# Patient Record
Sex: FEMALE | Race: BLACK OR AFRICAN AMERICAN | NOT HISPANIC OR LATINO | URBAN - METROPOLITAN AREA
[De-identification: names, ages, dates, MRNs, and addresses within clinical notes are randomized per-mention and may not be internally consistent; named-entity substitution may affect disease eponyms.]

---

## 2021-06-17 ENCOUNTER — OUTPATIENT (OUTPATIENT)
Dept: OUTPATIENT SERVICES | Facility: HOSPITAL | Age: 21
LOS: 1 days | Discharge: HOME | End: 2021-06-17
Payer: COMMERCIAL

## 2021-06-17 PROCEDURE — 90792 PSYCH DIAG EVAL W/MED SRVCS: CPT | Mod: 95

## 2021-07-15 DIAGNOSIS — F33.9 MAJOR DEPRESSIVE DISORDER, RECURRENT, UNSPECIFIED: ICD-10-CM

## 2021-07-15 DIAGNOSIS — F60.3 BORDERLINE PERSONALITY DISORDER: ICD-10-CM

## 2023-02-20 PROBLEM — Z00.00 ENCOUNTER FOR PREVENTIVE HEALTH EXAMINATION: Status: ACTIVE | Noted: 2023-02-20

## 2023-02-28 ENCOUNTER — APPOINTMENT (OUTPATIENT)
Dept: ORTHOPEDIC SURGERY | Facility: CLINIC | Age: 23
End: 2023-02-28

## 2023-03-03 ENCOUNTER — APPOINTMENT (OUTPATIENT)
Dept: ORTHOPEDIC SURGERY | Facility: CLINIC | Age: 23
End: 2023-03-03
Payer: COMMERCIAL

## 2023-03-03 ENCOUNTER — NON-APPOINTMENT (OUTPATIENT)
Age: 23
End: 2023-03-03

## 2023-03-03 VITALS
BODY MASS INDEX: 27.8 KG/M2 | DIASTOLIC BLOOD PRESSURE: 71 MMHG | OXYGEN SATURATION: 98 % | HEIGHT: 66 IN | WEIGHT: 173 LBS | SYSTOLIC BLOOD PRESSURE: 116 MMHG | HEART RATE: 80 BPM

## 2023-03-03 DIAGNOSIS — S63.104D UNSPECIFIED DISLOCATION OF RIGHT THUMB, SUBSEQUENT ENCOUNTER: ICD-10-CM

## 2023-03-03 PROCEDURE — 99204 OFFICE O/P NEW MOD 45 MIN: CPT | Mod: 57

## 2023-03-03 NOTE — HISTORY OF PRESENT ILLNESS
[FreeTextEntry1] : 22-year-old right-hand-dominant female presents with a right thumb injury x5 weeks.  She reports that she was lifting weights at the gym when the weight fell and injured her right thumb.  She went to an urgent care where she was told she had no fracture nor dislocation.  Since that time she has developed a swan-neck deformity to the thumb.  She has intense pain over the metacarpo phalangeal joint since that time.  She denies numbness and tingling.  She is able to actively flex and extend at the thumb IP joint.  She reports that she is unable to use the hand and would like to get back to her life.  She is here today for surgical evaluation.

## 2023-03-03 NOTE — PHYSICAL EXAM
[de-identified] : General: No acute distress\par Respiratory: Nonlabored\par Musculoskeletal: Right upper extremity was seen and examined\par Deformity noted at the right thumb–MP joint in hyperextension IP joint in flexion.\par She is able to flex and extend at the IP joint\par Radial and ulnar sensation at the thumb is intact\par 2+ radial pulse, less than 2-second capillary refill at the thumb\par Tender to palpation over the metacarpal phalangeal joint.\par Unable to manually reduce the joint today in the office [de-identified] : X-ray right thumb: MP joint incongruity with evidence of dislocation, no fractures

## 2023-03-10 ENCOUNTER — OUTPATIENT (OUTPATIENT)
Dept: OUTPATIENT SERVICES | Facility: HOSPITAL | Age: 23
LOS: 1 days | End: 2023-03-10
Payer: COMMERCIAL

## 2023-03-10 ENCOUNTER — APPOINTMENT (OUTPATIENT)
Dept: MRI IMAGING | Facility: CLINIC | Age: 23
End: 2023-03-10
Payer: COMMERCIAL

## 2023-03-10 ENCOUNTER — TRANSCRIPTION ENCOUNTER (OUTPATIENT)
Age: 23
End: 2023-03-10

## 2023-03-10 ENCOUNTER — OUTPATIENT (OUTPATIENT)
Dept: OUTPATIENT SERVICES | Facility: HOSPITAL | Age: 23
LOS: 1 days | End: 2023-03-10

## 2023-03-10 VITALS
HEART RATE: 79 BPM | DIASTOLIC BLOOD PRESSURE: 69 MMHG | SYSTOLIC BLOOD PRESSURE: 109 MMHG | OXYGEN SATURATION: 99 % | RESPIRATION RATE: 16 BRPM | WEIGHT: 171.96 LBS | TEMPERATURE: 99 F | HEIGHT: 66 IN

## 2023-03-10 DIAGNOSIS — S63.104A UNSPECIFIED DISLOCATION OF RIGHT THUMB, INITIAL ENCOUNTER: ICD-10-CM

## 2023-03-10 DIAGNOSIS — S63.104D UNSPECIFIED DISLOCATION OF RIGHT THUMB, SUBSEQUENT ENCOUNTER: ICD-10-CM

## 2023-03-10 DIAGNOSIS — Z00.00 ENCOUNTER FOR GENERAL ADULT MEDICAL EXAMINATION WITHOUT ABNORMAL FINDINGS: ICD-10-CM

## 2023-03-10 DIAGNOSIS — S63.104S: ICD-10-CM

## 2023-03-10 LAB
HCG UR QL: POSITIVE
HCT VFR BLD CALC: 36.5 % — SIGNIFICANT CHANGE UP (ref 34.5–45)
HGB BLD-MCNC: 11.7 G/DL — SIGNIFICANT CHANGE UP (ref 11.5–15.5)
MCHC RBC-ENTMCNC: 30 PG — SIGNIFICANT CHANGE UP (ref 27–34)
MCHC RBC-ENTMCNC: 32.1 GM/DL — SIGNIFICANT CHANGE UP (ref 32–36)
MCV RBC AUTO: 93.6 FL — SIGNIFICANT CHANGE UP (ref 80–100)
NRBC # BLD: 0 /100 WBCS — SIGNIFICANT CHANGE UP (ref 0–0)
NRBC # FLD: 0 K/UL — SIGNIFICANT CHANGE UP (ref 0–0)
PLATELET # BLD AUTO: 385 K/UL — SIGNIFICANT CHANGE UP (ref 150–400)
RBC # BLD: 3.9 M/UL — SIGNIFICANT CHANGE UP (ref 3.8–5.2)
RBC # FLD: 13.8 % — SIGNIFICANT CHANGE UP (ref 10.3–14.5)
WBC # BLD: 11.33 K/UL — HIGH (ref 3.8–10.5)
WBC # FLD AUTO: 11.33 K/UL — HIGH (ref 3.8–10.5)

## 2023-03-10 PROCEDURE — 73218 MRI UPPER EXTREMITY W/O DYE: CPT

## 2023-03-10 PROCEDURE — 73218 MRI UPPER EXTREMITY W/O DYE: CPT | Mod: 26,RT

## 2023-03-10 NOTE — H&P PST ADULT - SKIN COMMENTS
rash present on back of right hand, pt reports its a fungal rash and surgeon noted it during her consult visit.

## 2023-03-10 NOTE — H&P PST ADULT - HISTORY OF PRESENT ILLNESS
31 yo female with preop dx. unspecified dislocation right thumb presents to pre surgical testing.  Pt reports she dislocated her thumb 5-6 weeks ago, now s/p xray and MRI. Pt is scheduled for open reduction percutaneous pinning with possible ligament repair.

## 2023-03-10 NOTE — H&P PST ADULT - PROBLEM SELECTOR PLAN 1
Tommy Seo 
Pt is scheduled for open reduction percutaneous pinning with possible ligament repair. Verbal and written pre op instructions reviewed with patient and pt able to verbalize understanding.   Verbal and written instructions given with chlorhexidine wash, pt able to verbalize understanding via teach back method. Sterile cup given, pt instructed to bring urine sample AM of surgery for UCG and pt able to verbalize understanding.   Email sent to surgeon regarding rash on hand.

## 2023-03-10 NOTE — H&P PST ADULT - NEGATIVE MUSCULOSKELETAL SYMPTOMS
no neck pain/no arm pain L/no arm pain R/no back pain/no leg pain L/no leg pain R no neck pain/no arm pain L/no back pain/no leg pain L/no leg pain R

## 2023-03-10 NOTE — H&P PST ADULT - AS BP NONINV METHOD
Anesthetic History   No history of anesthetic complications            Review of Systems / Medical History  Patient summary reviewed, nursing notes reviewed and pertinent labs reviewed    Pulmonary            Asthma : well controlled       Neuro/Psych     seizures: well controlled    Psychiatric history    Comments: H/o MS Cardiovascular    Hypertension              Exercise tolerance: >4 METS     GI/Hepatic/Renal     GERD: well controlled      PUD     Endo/Other    Diabetes: well controlled, type 2    Obesity and arthritis     Other Findings   Comments:   Risk Factors for Postoperative nausea/vomiting:       History of postoperative nausea/vomiting? NO       Female? YES       Motion sickness? NO       Intended opioid administration for postoperative analgesia? YES      Smoking Abstinence  Current Smoker? NO  Elective Surgery? NO  Seen preoperatively by anesthesiologist or proxy prior to day of surgery? YES  Pt abstained from smoking 24 hours prior to anesthesia?  N/A         Physical Exam    Airway  Mallampati: III  TM Distance: 4 - 6 cm  Neck ROM: normal range of motion   Mouth opening: Normal     Cardiovascular  Regular rate and rhythm,  S1 and S2 normal,  no murmur, click, rub, or gallop  Rhythm: regular  Rate: normal         Dental  No notable dental hx       Pulmonary  Breath sounds clear to auscultation               Abdominal  GI exam deferred       Other Findings            Anesthetic Plan    ASA: 3  Anesthesia type: general and regional - supraclavicular block          Induction: Intravenous  Anesthetic plan and risks discussed with: Patient
electronic

## 2023-03-15 ENCOUNTER — TRANSCRIPTION ENCOUNTER (OUTPATIENT)
Age: 23
End: 2023-03-15

## 2023-03-16 ENCOUNTER — OUTPATIENT (OUTPATIENT)
Dept: OUTPATIENT SERVICES | Facility: HOSPITAL | Age: 23
LOS: 1 days | Discharge: HOME | End: 2023-03-16
Payer: COMMERCIAL

## 2023-03-16 ENCOUNTER — APPOINTMENT (OUTPATIENT)
Dept: ORTHOPEDIC SURGERY | Facility: AMBULATORY SURGERY CENTER | Age: 23
End: 2023-03-16

## 2023-03-16 ENCOUNTER — TRANSCRIPTION ENCOUNTER (OUTPATIENT)
Age: 23
End: 2023-03-16

## 2023-03-16 VITALS
OXYGEN SATURATION: 99 % | DIASTOLIC BLOOD PRESSURE: 74 MMHG | WEIGHT: 171.96 LBS | HEART RATE: 74 BPM | HEIGHT: 66 IN | SYSTOLIC BLOOD PRESSURE: 111 MMHG | RESPIRATION RATE: 16 BRPM | TEMPERATURE: 98 F

## 2023-03-16 VITALS
HEART RATE: 62 BPM | RESPIRATION RATE: 16 BRPM | TEMPERATURE: 98 F | OXYGEN SATURATION: 98 % | SYSTOLIC BLOOD PRESSURE: 111 MMHG | DIASTOLIC BLOOD PRESSURE: 70 MMHG

## 2023-03-16 DIAGNOSIS — S63.104D UNSPECIFIED DISLOCATION OF RIGHT THUMB, SUBSEQUENT ENCOUNTER: ICD-10-CM

## 2023-03-16 PROCEDURE — 26541 REPAIR HAND JOINT WITH GRAFT: CPT | Mod: F5

## 2023-03-16 PROCEDURE — 26735 TREAT FINGER FRACTURE EACH: CPT | Mod: F5

## 2023-03-16 PROCEDURE — 26715 TREAT KNUCKLE DISLOCATION: CPT | Mod: F5

## 2023-03-16 DEVICE — K-WIRE S&N DOUBLE TROCAR 0.035" X 4": Type: IMPLANTABLE DEVICE | Status: FUNCTIONAL

## 2023-03-16 DEVICE — K-WIRE S&N DOUBLE TROCAR 0.062" X 4": Type: IMPLANTABLE DEVICE | Status: FUNCTIONAL

## 2023-03-16 DEVICE — ANCHOR DX SWIVLCK SL FORK EYELET 3.5X8.5MM: Type: IMPLANTABLE DEVICE | Status: FUNCTIONAL

## 2023-03-16 RX ORDER — OXYCODONE 5 MG/1
5 TABLET ORAL
Qty: 10 | Refills: 0 | Status: ACTIVE | COMMUNITY
Start: 2023-03-16 | End: 1900-01-01

## 2023-03-16 NOTE — BRIEF OPERATIVE NOTE - OPERATION/FINDINGS
right thumb dislocation, radial collateral ligament tear now s/p open reduction percutaneous pinning with internal brace using labral tape

## 2023-03-16 NOTE — BRIEF OPERATIVE NOTE - NSICDXBRIEFPOSTOP_GEN_ALL_CORE_FT
POST-OP DIAGNOSIS:  Rupture of radial collateral ligament of right thumb 16-Mar-2023 16:04:59  Alvaro Mcintyre

## 2023-03-16 NOTE — ASU DISCHARGE PLAN (ADULT/PEDIATRIC) - NS MD DC FALL RISK RISK
For information on Fall & Injury Prevention, visit: [email or web address] OR  [email or web address] OR  https://www.cdc.gov/steadi/patient.html

## 2023-03-16 NOTE — ASU PREOP CHECKLIST - HAND OFF
Service to Ortho/Hand referral    2/24/2023  vat  Right 2nd proximal phalanx base fracture    DOI: 2-24-23  Images:  Done and in epic.      Other: splinted    Call from referring provider, made appointment with Fox Han MD using 410 pm slot  on Monday February/27/2023, [place] location.        Albert Waters PA-C  [facility]  [facility]  Main Orthopedic Line: [phone]         Supervising Physician for this date and note.   Dr Ky Carolina        Holding RN to OR RN

## 2023-03-16 NOTE — BRIEF OPERATIVE NOTE - NSICDXBRIEFPREOP_GEN_ALL_CORE_FT
PRE-OP DIAGNOSIS:  Rupture of radial collateral ligament of right thumb 16-Mar-2023 16:05:26  Alvaro Mcintyre

## 2023-03-16 NOTE — ASU DISCHARGE PLAN (ADULT/PEDIATRIC) - CARE PROVIDER_API CALL
Marco Antonio Gonzalez)  Orthopedics  [id number] [place] [address]  Phone: [phone]  Fax: [phone]  Follow Up Time: 1 week

## 2023-03-16 NOTE — ASU DISCHARGE PLAN (ADULT/PEDIATRIC) - NURSING INSTRUCTIONS
DO NOT take any Tylenol (Acetaminophen) or narcotics containing Tylenol until after 7:45pm . You received Tylenol during your operation and it can cause damage to your liver if too much is taken within a 24 hour time period.  No Motrin/Advil/Aleve until 10:45pm

## 2023-03-16 NOTE — BRIEF OPERATIVE NOTE - NSICDXBRIEFPROCEDURE_GEN_ALL_CORE_FT
PROCEDURES:  Repair of ligament of right thumb 16-Mar-2023 16:04:15  Alvaro Mcintyre  Open reduction and internal fixation (ORIF) of metacarpal bone with percutaneous pinning 16-Mar-2023 16:04:32  Alvaro Mcintyre

## 2023-03-31 ENCOUNTER — APPOINTMENT (OUTPATIENT)
Dept: ORTHOPEDIC SURGERY | Facility: CLINIC | Age: 23
End: 2023-03-31
Payer: COMMERCIAL

## 2023-03-31 PROCEDURE — 29130 APPL FINGER SPLINT STATIC: CPT | Mod: 58

## 2023-03-31 PROCEDURE — 99024 POSTOP FOLLOW-UP VISIT: CPT

## 2023-03-31 PROCEDURE — 73140 X-RAY EXAM OF FINGER(S): CPT

## 2023-04-01 PROBLEM — S63.104S: Chronic | Status: ACTIVE | Noted: 2023-03-10

## 2023-04-01 NOTE — HISTORY OF PRESENT ILLNESS
[de-identified] : S/p right thumb open tx fracture, MPJ reduction, RCL reconstruction [de-identified] : Pain well controlled\par Very pleased with the appearance of her thumb\par Doing well [de-identified] : Incision c/d/i- sutures removed and steri strip applied\par Pin site c/d/i- dressed with xeroform\par Minimally decreased sensation at thumb\par ROM deferred\par Thumb spica splint fashioned [de-identified] : XR R Thumb: Reduced MPJ, pin in place [de-identified] : s/p open treatment proximal phalanx fracture, MPJ reduction and pinning, RCL reconstruction [de-identified] : F/u in 2 weeks for pin removal\par Keep splint c/d/i\par NWB PHILLIPE

## 2023-04-14 ENCOUNTER — APPOINTMENT (OUTPATIENT)
Dept: ORTHOPEDIC SURGERY | Facility: CLINIC | Age: 23
End: 2023-04-14
Payer: COMMERCIAL

## 2023-04-14 PROCEDURE — 99024 POSTOP FOLLOW-UP VISIT: CPT

## 2023-04-14 PROCEDURE — 73140 X-RAY EXAM OF FINGER(S): CPT | Mod: RT

## 2023-04-14 NOTE — HISTORY OF PRESENT ILLNESS
[de-identified] : S/p right thumb open tx fracture, MPJ reduction, RCL reconstruction [de-identified] : Pain well controlled\par Slight volar displacement at MPJ [de-identified] : Incision c/d/i\par Pin pulled nad sterile band-aid applied\par SILT throughout\par Fires FPL [de-identified] : XR R Thumb: MPJ with slight volar displacement/ extension at proximal phalanx- improved compared to preop. Pin removed [de-identified] : s/p open treatment proximal phalanx fracture, MPJ reduction and pinning, RCL reconstruction [de-identified] : OT for thermoplast splint\par OK to begin ROM after 2 weeks of immobilization in splint- OK for IP free

## 2023-05-04 ENCOUNTER — APPOINTMENT (OUTPATIENT)
Dept: ORTHOPEDIC SURGERY | Facility: CLINIC | Age: 23
End: 2023-05-04

## 2023-05-19 ENCOUNTER — EMERGENCY (EMERGENCY)
Facility: HOSPITAL | Age: 23
LOS: 1 days | Discharge: HOME | End: 2023-05-19
Attending: EMERGENCY MEDICINE | Admitting: EMERGENCY MEDICINE
Payer: COMMERCIAL

## 2023-05-19 VITALS
DIASTOLIC BLOOD PRESSURE: 74 MMHG | OXYGEN SATURATION: 100 % | TEMPERATURE: 99 F | SYSTOLIC BLOOD PRESSURE: 109 MMHG | HEART RATE: 110 BPM | RESPIRATION RATE: 17 BRPM

## 2023-05-19 LAB
ALBUMIN SERPL ELPH-MCNC: 3.7 G/DL — SIGNIFICANT CHANGE UP (ref 3.3–5)
ALP SERPL-CCNC: 178 U/L — HIGH (ref 40–120)
ALT FLD-CCNC: 34 U/L — HIGH (ref 4–33)
ANION GAP SERPL CALC-SCNC: 16 MMOL/L — HIGH (ref 7–14)
APPEARANCE UR: CLEAR — SIGNIFICANT CHANGE UP
AST SERPL-CCNC: 30 U/L — SIGNIFICANT CHANGE UP (ref 4–32)
BACTERIA # UR AUTO: NEGATIVE — SIGNIFICANT CHANGE UP
BILIRUB SERPL-MCNC: 0.3 MG/DL — SIGNIFICANT CHANGE UP (ref 0.2–1.2)
BILIRUB UR-MCNC: NEGATIVE — SIGNIFICANT CHANGE UP
BUN SERPL-MCNC: 3 MG/DL — LOW (ref 7–23)
CALCIUM SERPL-MCNC: 9.2 MG/DL — SIGNIFICANT CHANGE UP (ref 8.4–10.5)
CHLORIDE SERPL-SCNC: 99 MMOL/L — SIGNIFICANT CHANGE UP (ref 98–107)
CO2 SERPL-SCNC: 20 MMOL/L — LOW (ref 22–31)
COLOR SPEC: SIGNIFICANT CHANGE UP
CREAT SERPL-MCNC: 0.75 MG/DL — SIGNIFICANT CHANGE UP (ref 0.5–1.3)
DIFF PNL FLD: ABNORMAL
EGFR: 114 ML/MIN/1.73M2 — SIGNIFICANT CHANGE UP
GLUCOSE SERPL-MCNC: 97 MG/DL — SIGNIFICANT CHANGE UP (ref 70–99)
GLUCOSE UR QL: NEGATIVE — SIGNIFICANT CHANGE UP
HCT VFR BLD CALC: 34.3 % — LOW (ref 34.5–45)
HGB BLD-MCNC: 11.1 G/DL — LOW (ref 11.5–15.5)
KETONES UR-MCNC: ABNORMAL
LEUKOCYTE ESTERASE UR-ACNC: NEGATIVE — SIGNIFICANT CHANGE UP
LIDOCAIN IGE QN: 29 U/L — SIGNIFICANT CHANGE UP (ref 7–60)
MCHC RBC-ENTMCNC: 29.4 PG — SIGNIFICANT CHANGE UP (ref 27–34)
MCHC RBC-ENTMCNC: 32.4 GM/DL — SIGNIFICANT CHANGE UP (ref 32–36)
MCV RBC AUTO: 91 FL — SIGNIFICANT CHANGE UP (ref 80–100)
NITRITE UR-MCNC: NEGATIVE — SIGNIFICANT CHANGE UP
NRBC # BLD: 0 /100 WBCS — SIGNIFICANT CHANGE UP (ref 0–0)
NRBC # FLD: 0 K/UL — SIGNIFICANT CHANGE UP (ref 0–0)
PH UR: 6.5 — SIGNIFICANT CHANGE UP (ref 5–8)
PLATELET # BLD AUTO: 440 K/UL — HIGH (ref 150–400)
POTASSIUM SERPL-MCNC: 4 MMOL/L — SIGNIFICANT CHANGE UP (ref 3.5–5.3)
POTASSIUM SERPL-SCNC: 4 MMOL/L — SIGNIFICANT CHANGE UP (ref 3.5–5.3)
PROT SERPL-MCNC: 7.2 G/DL — SIGNIFICANT CHANGE UP (ref 6–8.3)
PROT UR-MCNC: NEGATIVE — SIGNIFICANT CHANGE UP
RBC # BLD: 3.77 M/UL — LOW (ref 3.8–5.2)
RBC # FLD: 13.1 % — SIGNIFICANT CHANGE UP (ref 10.3–14.5)
RBC CASTS # UR COMP ASSIST: SIGNIFICANT CHANGE UP /HPF (ref 0–4)
SODIUM SERPL-SCNC: 135 MMOL/L — SIGNIFICANT CHANGE UP (ref 135–145)
SP GR SPEC: 1 — LOW (ref 1.01–1.05)
UROBILINOGEN FLD QL: SIGNIFICANT CHANGE UP
WBC # BLD: 15.3 K/UL — HIGH (ref 3.8–10.5)
WBC # FLD AUTO: 15.3 K/UL — HIGH (ref 3.8–10.5)
WBC UR QL: SIGNIFICANT CHANGE UP /HPF (ref 0–5)

## 2023-05-19 PROCEDURE — 99284 EMERGENCY DEPT VISIT MOD MDM: CPT

## 2023-05-19 PROCEDURE — 93010 ELECTROCARDIOGRAM REPORT: CPT

## 2023-05-19 RX ORDER — ACETAMINOPHEN 500 MG
650 TABLET ORAL ONCE
Refills: 0 | Status: COMPLETED | OUTPATIENT
Start: 2023-05-19 | End: 2023-05-19

## 2023-05-19 RX ORDER — SODIUM CHLORIDE 9 MG/ML
1000 INJECTION, SOLUTION INTRAVENOUS ONCE
Refills: 0 | Status: COMPLETED | OUTPATIENT
Start: 2023-05-19 | End: 2023-05-19

## 2023-05-19 RX ORDER — ACETAMINOPHEN 500 MG
650 TABLET ORAL ONCE
Refills: 0 | Status: COMPLETED | OUTPATIENT
Start: 2023-05-19 | End: 2023-05-20

## 2023-05-19 RX ORDER — HYDROCORTISONE 1 %
1 OINTMENT (GRAM) TOPICAL ONCE
Refills: 0 | Status: COMPLETED | OUTPATIENT
Start: 2023-05-19 | End: 2023-05-19

## 2023-05-19 RX ORDER — ACETAMINOPHEN 500 MG
1000 TABLET ORAL ONCE
Refills: 0 | Status: COMPLETED | OUTPATIENT
Start: 2023-05-19 | End: 2023-05-19

## 2023-05-19 RX ADMIN — Medication 400 MILLIGRAM(S): at 19:03

## 2023-05-19 RX ADMIN — SODIUM CHLORIDE 1000 MILLILITER(S): 9 INJECTION, SOLUTION INTRAVENOUS at 19:03

## 2023-05-19 RX ADMIN — SODIUM CHLORIDE 1000 MILLILITER(S): 9 INJECTION, SOLUTION INTRAVENOUS at 20:03

## 2023-05-19 RX ADMIN — Medication 1000 MILLIGRAM(S): at 19:18

## 2023-05-19 RX ADMIN — Medication 1000 MILLIGRAM(S): at 19:33

## 2023-05-19 NOTE — ED ADULT NURSE REASSESSMENT NOTE - NS ED NURSE REASSESS COMMENT FT1
report rcd form day shift rn irp and kilo. pt a&ox4 with no new complaints. pt pending CT. 18g to the LAC with no redness or swelling. pt respirations even and unlabored with no accessory muscle use. pt appears in NAD

## 2023-05-19 NOTE — ED PROVIDER NOTE - OBJECTIVE STATEMENT
22-year-old female without past medical history coming in with 5 days of abdominal pain with watery diarrhea many times a day.  Has tried Pepto-Bismol Kaopectate and Imodium without relief.  Was seen at an urgent care but symptoms have not resolved.  Mild nausea no vomiting.  Decreased p.o. intake.  Pain radiates to the back a little bit. LMP May 4 came at the normal time.  patient has had no surgeries on no meds prior to this event.  Denies travel or change in diet.  No one sick around her.

## 2023-05-19 NOTE — ED PROVIDER NOTE - PATIENT PORTAL LINK FT
You can access the FollowMyHealth Patient Portal offered by [facility] by registering at the following website: [email or web address]. By joining [facility]’s FollowMyHealth portal, you will also be able to view your health information using other applications (apps) compatible with our system.

## 2023-05-19 NOTE — ED PROVIDER NOTE - PHYSICAL EXAMINATION
Patient tearful but in no acute distress   HEENT normal no jaundice mucous membranes moist   neck supple no adenopathy   heart S1-S2 mildly tachycardic   lungs clear   abdomen soft tender mostly in the lower abdomen but not suprapubically with mild tenderness also in the epigastrium.  No guarding no rebound bowel sounds positive  .  Extremities no edema   back no tenderness no CVA tenderness

## 2023-05-19 NOTE — ED PROVIDER NOTE - NSFOLLOWUPINSTRUCTIONS_ED_ALL_ED_FT
Your CT scan showed colitis, which is the cause of your diarrhea and cramping.   This is most often caused by viruses.    Drink plenty of fluids.  Tylenol up to 1000mg up to 4x per day.  You can add Naproxen (Alleve) 2 tabs, up to 2x per day, take with food.    Reglan 10mg use up to 3x per day prior to meals.  Continue Kaopectate and use imodium as needed, repeating every 2 hrs. for additional liquid stools.    You may fill Azithromycin prescription for fever, blood in stool or persistent symptoms, but return to Emergency for worsening pain, vomiting, fainting, fevers, or any signs of distress.

## 2023-05-19 NOTE — ED ADULT TRIAGE NOTE - CHIEF COMPLAINT QUOTE
Pt c/o of RLQ abdominal pain that radiates to the left side. Patient states she has been experiencing diarrhea. Denies vomiting. States she has poor PO intake. States she has been taking imodium, omeprazole and pepto-bismal without experiencing relief. Denies chest pain, headache, dizziness.

## 2023-05-19 NOTE — ED ADULT NURSE NOTE - OBJECTIVE STATEMENT
initial nurse note not done. report received from AM shift RN Luque, pt A&Ox4 respirations even unlabored  completing full sentences. 18g IV placed by AM shift, flushes with no resistance, + blood return. pt presents w/ 5 days of abdominal pain with mult ep of watery like diarrhea. pt denies blood in stool. pt states took OTC meds with no relief and was seen at an urgent care but symptoms have not resolved.  pt endorses decreased p.o. intake.  LMP May 4.  .  Denies travel or change in diet.  No one sick around her. ambulatory to bathroom steady gait noted. stretcher lowest position siderails up family at bedside. safety measures in place

## 2023-05-19 NOTE — ED PROVIDER NOTE - CLINICAL SUMMARY MEDICAL DECISION MAKING FREE TEXT BOX
22-year-old female with diarrhea for 5 days and severe abdominal pain.  This is a second visit to a healthcare facility for this problem.  We will get stool culture for PCR.  CBC CMP.  IV fluids pain control and CAT scan.

## 2023-05-20 VITALS
OXYGEN SATURATION: 100 % | RESPIRATION RATE: 17 BRPM | SYSTOLIC BLOOD PRESSURE: 117 MMHG | HEART RATE: 78 BPM | DIASTOLIC BLOOD PRESSURE: 72 MMHG | TEMPERATURE: 98 F

## 2023-05-20 PROCEDURE — 74177 CT ABD & PELVIS W/CONTRAST: CPT | Mod: 26,MA

## 2023-05-20 RX ORDER — AZITHROMYCIN 500 MG/1
1 TABLET, FILM COATED ORAL
Qty: 3 | Refills: 0
Start: 2023-05-20 | End: 2023-05-22

## 2023-05-20 RX ORDER — AZITHROMYCIN 500 MG/1
250 TABLET, FILM COATED ORAL
Qty: 6 | Refills: 0
Start: 2023-05-20 | End: 2023-05-24

## 2023-05-20 RX ORDER — METOCLOPRAMIDE HCL 10 MG
1 TABLET ORAL
Qty: 15 | Refills: 0
Start: 2023-05-20

## 2023-05-20 RX ADMIN — Medication 650 MILLIGRAM(S): at 01:23

## 2023-05-20 RX ADMIN — Medication 1 SUPPOSITORY(S): at 00:29

## 2023-06-15 ENCOUNTER — INPATIENT (INPATIENT)
Facility: HOSPITAL | Age: 23
LOS: 6 days | Discharge: HOME | End: 2023-06-22
Attending: INTERNAL MEDICINE | Admitting: INTERNAL MEDICINE
Payer: COMMERCIAL

## 2023-06-15 VITALS
OXYGEN SATURATION: 100 % | HEART RATE: 93 BPM | DIASTOLIC BLOOD PRESSURE: 65 MMHG | SYSTOLIC BLOOD PRESSURE: 106 MMHG | RESPIRATION RATE: 20 BRPM | TEMPERATURE: 98 F

## 2023-06-15 DIAGNOSIS — Z29.9 ENCOUNTER FOR PROPHYLACTIC MEASURES, UNSPECIFIED: ICD-10-CM

## 2023-06-15 DIAGNOSIS — K51.00 ULCERATIVE (CHRONIC) PANCOLITIS WITHOUT COMPLICATIONS: ICD-10-CM

## 2023-06-15 DIAGNOSIS — K50.90 CROHN'S DISEASE, UNSPECIFIED, WITHOUT COMPLICATIONS: ICD-10-CM

## 2023-06-15 DIAGNOSIS — Z79.899 OTHER LONG TERM (CURRENT) DRUG THERAPY: ICD-10-CM

## 2023-06-15 LAB
ALBUMIN SERPL ELPH-MCNC: 4 G/DL — SIGNIFICANT CHANGE UP (ref 3.3–5)
ALP SERPL-CCNC: 181 U/L — HIGH (ref 40–120)
ALT FLD-CCNC: 37 U/L — HIGH (ref 4–33)
ANION GAP SERPL CALC-SCNC: 10 MMOL/L — SIGNIFICANT CHANGE UP (ref 7–14)
AST SERPL-CCNC: 25 U/L — SIGNIFICANT CHANGE UP (ref 4–32)
BASOPHILS # BLD AUTO: 0.03 K/UL — SIGNIFICANT CHANGE UP (ref 0–0.2)
BASOPHILS NFR BLD AUTO: 0.3 % — SIGNIFICANT CHANGE UP (ref 0–2)
BILIRUB SERPL-MCNC: <0.2 MG/DL — SIGNIFICANT CHANGE UP (ref 0.2–1.2)
BUN SERPL-MCNC: 6 MG/DL — LOW (ref 7–23)
CALCIUM SERPL-MCNC: 9.5 MG/DL — SIGNIFICANT CHANGE UP (ref 8.4–10.5)
CHLORIDE SERPL-SCNC: 102 MMOL/L — SIGNIFICANT CHANGE UP (ref 98–107)
CO2 SERPL-SCNC: 24 MMOL/L — SIGNIFICANT CHANGE UP (ref 22–31)
CREAT SERPL-MCNC: 0.66 MG/DL — SIGNIFICANT CHANGE UP (ref 0.5–1.3)
EGFR: 126 ML/MIN/1.73M2 — SIGNIFICANT CHANGE UP
EOSINOPHIL # BLD AUTO: 0.36 K/UL — SIGNIFICANT CHANGE UP (ref 0–0.5)
EOSINOPHIL NFR BLD AUTO: 3.1 % — SIGNIFICANT CHANGE UP (ref 0–6)
GLUCOSE SERPL-MCNC: 79 MG/DL — SIGNIFICANT CHANGE UP (ref 70–99)
HCG SERPL-ACNC: <1 MIU/ML — SIGNIFICANT CHANGE UP
HCT VFR BLD CALC: 34 % — LOW (ref 34.5–45)
HGB BLD-MCNC: 10.8 G/DL — LOW (ref 11.5–15.5)
IANC: 7.12 K/UL — SIGNIFICANT CHANGE UP (ref 1.8–7.4)
IMM GRANULOCYTES NFR BLD AUTO: 0.5 % — SIGNIFICANT CHANGE UP (ref 0–0.9)
LIDOCAIN IGE QN: 47 U/L — SIGNIFICANT CHANGE UP (ref 7–60)
LYMPHOCYTES # BLD AUTO: 2.77 K/UL — SIGNIFICANT CHANGE UP (ref 1–3.3)
LYMPHOCYTES # BLD AUTO: 24.2 % — SIGNIFICANT CHANGE UP (ref 13–44)
MAGNESIUM SERPL-MCNC: 2.1 MG/DL — SIGNIFICANT CHANGE UP (ref 1.6–2.6)
MCHC RBC-ENTMCNC: 28.6 PG — SIGNIFICANT CHANGE UP (ref 27–34)
MCHC RBC-ENTMCNC: 31.8 GM/DL — LOW (ref 32–36)
MCV RBC AUTO: 89.9 FL — SIGNIFICANT CHANGE UP (ref 80–100)
MONOCYTES # BLD AUTO: 1.11 K/UL — HIGH (ref 0–0.9)
MONOCYTES NFR BLD AUTO: 9.7 % — SIGNIFICANT CHANGE UP (ref 2–14)
NEUTROPHILS # BLD AUTO: 7.12 K/UL — SIGNIFICANT CHANGE UP (ref 1.8–7.4)
NEUTROPHILS NFR BLD AUTO: 62.2 % — SIGNIFICANT CHANGE UP (ref 43–77)
NRBC # BLD: 0 /100 WBCS — SIGNIFICANT CHANGE UP (ref 0–0)
NRBC # FLD: 0 K/UL — SIGNIFICANT CHANGE UP (ref 0–0)
PHOSPHATE SERPL-MCNC: 3.4 MG/DL — SIGNIFICANT CHANGE UP (ref 2.5–4.5)
PLATELET # BLD AUTO: 391 K/UL — SIGNIFICANT CHANGE UP (ref 150–400)
POTASSIUM SERPL-MCNC: 4.2 MMOL/L — SIGNIFICANT CHANGE UP (ref 3.5–5.3)
POTASSIUM SERPL-SCNC: 4.2 MMOL/L — SIGNIFICANT CHANGE UP (ref 3.5–5.3)
PROT SERPL-MCNC: 7.3 G/DL — SIGNIFICANT CHANGE UP (ref 6–8.3)
RBC # BLD: 3.78 M/UL — LOW (ref 3.8–5.2)
RBC # FLD: 14.2 % — SIGNIFICANT CHANGE UP (ref 10.3–14.5)
SODIUM SERPL-SCNC: 136 MMOL/L — SIGNIFICANT CHANGE UP (ref 135–145)
WBC # BLD: 11.45 K/UL — HIGH (ref 3.8–10.5)
WBC # FLD AUTO: 11.45 K/UL — HIGH (ref 3.8–10.5)

## 2023-06-15 PROCEDURE — 99223 1ST HOSP IP/OBS HIGH 75: CPT

## 2023-06-15 PROCEDURE — 74177 CT ABD & PELVIS W/CONTRAST: CPT | Mod: 26,MA

## 2023-06-15 PROCEDURE — 99285 EMERGENCY DEPT VISIT HI MDM: CPT

## 2023-06-15 RX ORDER — SODIUM CHLORIDE 9 MG/ML
1000 INJECTION INTRAMUSCULAR; INTRAVENOUS; SUBCUTANEOUS
Refills: 0 | Status: DISCONTINUED | OUTPATIENT
Start: 2023-06-15 | End: 2023-06-22

## 2023-06-15 RX ORDER — IBUPROFEN 200 MG
1 TABLET ORAL
Qty: 0 | Refills: 0 | DISCHARGE

## 2023-06-15 RX ORDER — MORPHINE SULFATE 50 MG/1
2 CAPSULE, EXTENDED RELEASE ORAL EVERY 6 HOURS
Refills: 0 | Status: DISCONTINUED | OUTPATIENT
Start: 2023-06-15 | End: 2023-06-16

## 2023-06-15 RX ORDER — MORPHINE SULFATE 50 MG/1
4 CAPSULE, EXTENDED RELEASE ORAL ONCE
Refills: 0 | Status: DISCONTINUED | OUTPATIENT
Start: 2023-06-15 | End: 2023-06-15

## 2023-06-15 RX ORDER — ACETAMINOPHEN 500 MG
975 TABLET ORAL ONCE
Refills: 0 | Status: COMPLETED | OUTPATIENT
Start: 2023-06-15 | End: 2023-06-15

## 2023-06-15 RX ORDER — LACTOBACILLUS ACIDOPHILUS 100MM CELL
1 CAPSULE ORAL DAILY
Refills: 0 | Status: DISCONTINUED | OUTPATIENT
Start: 2023-06-15 | End: 2023-06-22

## 2023-06-15 RX ORDER — ACETAMINOPHEN 500 MG
650 TABLET ORAL EVERY 6 HOURS
Refills: 0 | Status: DISCONTINUED | OUTPATIENT
Start: 2023-06-15 | End: 2023-06-22

## 2023-06-15 RX ORDER — SODIUM CHLORIDE 9 MG/ML
1000 INJECTION INTRAMUSCULAR; INTRAVENOUS; SUBCUTANEOUS ONCE
Refills: 0 | Status: COMPLETED | OUTPATIENT
Start: 2023-06-15 | End: 2023-06-15

## 2023-06-15 RX ADMIN — MORPHINE SULFATE 4 MILLIGRAM(S): 50 CAPSULE, EXTENDED RELEASE ORAL at 20:45

## 2023-06-15 RX ADMIN — Medication 975 MILLIGRAM(S): at 13:04

## 2023-06-15 RX ADMIN — SODIUM CHLORIDE 1000 MILLILITER(S): 9 INJECTION INTRAMUSCULAR; INTRAVENOUS; SUBCUTANEOUS at 13:03

## 2023-06-15 RX ADMIN — MORPHINE SULFATE 4 MILLIGRAM(S): 50 CAPSULE, EXTENDED RELEASE ORAL at 17:16

## 2023-06-15 NOTE — ED PROVIDER NOTE - PROGRESS NOTE DETAILS
ct shows pancolitis. concern for UC or Crohn's. pt still in pain. tba for gi and pain control. gi emailed

## 2023-06-15 NOTE — H&P ADULT - NSHPREVIEWOFSYSTEMS_GEN_ALL_CORE
Review of Systems:   CONSTITUTIONAL: No fever  EYES: No eye pain, visual disturbances, or discharge  ENMT:  No difficulty hearing, tinnitus, vertigo; No sinus or throat pain  NECK: No pain or stiffness  RESPIRATORY: No cough, wheezing, chills or hemoptysis; No shortness of breath  CARDIOVASCULAR: No chest pain, palpitations, dizziness, or leg swelling  GASTROINTESTINAL: positional/intermittent rlq pain. No nausea, vomiting, or hematemesis; No diarrhea or constipation. No melena or hematochezia.  GENITOURINARY: No dysuria, frequency, hematuria, or incontinence  NEUROLOGICAL: No headaches, memory loss, loss of strength, numbness, or tremors  SKIN: No itching, burning, rashes, or lesions   MUSCULOSKELETAL: No joint pain or swelling; No muscle, back, or extremity pain

## 2023-06-15 NOTE — ED PROVIDER NOTE - CLINICAL SUMMARY MEDICAL DECISION MAKING FREE TEXT BOX
Jourdan - Patient is a 22-year-old female with a history of colitis who presents to the ED with abdominal pain. Differential diagnosis includes but is not limited to appendicitis versus viral colitis versus Crohn's disease/ulcerative colitis will check labs, CT abdomen. Give IV fluids and Tylenol for symptom control..

## 2023-06-15 NOTE — ED PROVIDER NOTE - PHYSICAL EXAMINATION
Bernie Soliman MD  GENERAL: Patient awake alert NAD.  HEENT: NC/AT, Moist mucous membranes, EOMI.  LUNGS: CTAB, no wheezes or crackles.   CARDIAC: RRR, no m/r/g.    ABDOMEN: Soft, +tender RLQ, ND, No rebound, guarding. No CVA tenderness.   EXT: No edema. No calf tenderness.   MSK: No pain with movement, no deformities.  NEURO: A&Ox3. Moving all extremities.  SKIN: Warm and dry. No rash.  PSYCH: Normal affect.

## 2023-06-15 NOTE — ED PROVIDER NOTE - ATTENDING CONTRIBUTION TO CARE
David: I have seen and examined the patient face to face, have reviewed and addended the HPI, PE and a/p as necessary.     21 yo f with recent colitis a/w abdominal pain.  Seen 1 month ago for diarrhea and abd cramping found to have colitis at that time.  Pt scheduled for GI next week for follow up .  Pt reports ddiarrhea has decreased from 12 x /day to 5-6x day, but noted still having loose stool, nonbloody.  Today patient reports increasing pain mainly in the RLQ.  Denies any denies any nausea or vomiting, fevers or chills, lightheadedness or dizziness or fatigue.      Fhx Crohn's disease.      GEN - NAD; well appearing; A+O x3; non-toxic appearing  CARD -s1s2, RRR, no M,G,R;   PULM - CTA b/l, symmetric breath sounds;   ABD -  +BS, TTP in RLQ soft, no guarding, no rebound, no masses;   BACK - no CVA tenderness, Normal  spine;   EXT - symmetric pulses, 2+ dp, capillary refill < 2 seconds, no cyanosis, no edema;   NEURO - no focal neuro deficits, no slurred speech    Concern for acute appy vs terminal ileitis, strong suspicion for inflammatory bowel disease. Has follow up with GI next week, hwoever will need to r/o intraabdominal abscess vs pancolitis vs terminal ileitis.  May require obs vs admission.

## 2023-06-15 NOTE — ED ADULT NURSE REASSESSMENT NOTE - NS ED NURSE REASSESS COMMENT FT1
Report received from LEEROY Blake. Pt A&Ox4, resting in stretcher, RR even and unlabored. VSS and noted, pt reporting increase in pain consistent with chief complaint, team made aware, awaiting further orders. Safety in place. NAD noted

## 2023-06-15 NOTE — H&P ADULT - HISTORY OF PRESENT ILLNESS
21 yo f with recent colitis a/w abdominal pain.  Seen 1 month ago here for diarrhea and abd cramping found to have pancolitis at that time.  Pt scheduled for August GI  for follow up .  Pt reports diarrhea has decreased substantially in   last 3 weeks, since starting bland diet, daily probiotics but noted still having loose stool, nonbloody.  Today patient reports increasing pain mainly in the RLQ.  Denies any denies any nausea or vomiting, fevers or chills, lightheadedness or dizziness or fatigue.  CT abd here redemonstrated pancolitis; Wall thickening of the terminal ileum, which may be reactive or reflective of terminal ileitis. Mild heterogeneous enhancement of the right hepatic lobe of uncertain etiology or clinical significance. LFTs /tbili unremarkable save for . Pt well-appearing, vitals stable. Has required morphine x 2,  Tylenol since ed arrival     23 yo f with recent colitis a/w abdominal pain.  Seen 1 month ago here for diarrhea and abd cramping found to have pancolitis at that time.  Pt scheduled for August GI  for follow up .  Pt reports diarrhea has decreased substantially in   last 3 weeks, since starting bland diet, daily probiotics but noted still having loose stool, nonbloody.  Today patient reports increasing pain mainly in the RLQ.  Denies any denies any nausea or vomiting, fevers or chills, lightheadedness or dizziness or fatigue.  CT abd here redemonstrated pancolitis; Wall thickening of the terminal ileum, which may be reactive or reflective of terminal ileitis. Mild heterogeneous enhancement of the right hepatic lobe of uncertain etiology or clinical significance. Appendix not definitively visualized. LFTs /tbili unremarkable save for . hcg negative. Pt has sister with Crohn's disease.  Pt well-appearing o my exam,  vitals stable. Has required morphine x 2,  tylenol since ed arrival.

## 2023-06-15 NOTE — H&P ADULT - PROBLEM SELECTOR PLAN 1
-high suspicion for Crohn's given CT findings and first degree family history   -supportive care with IVF, prn pain meds  -liquid diet  -gi emailed  -appendix not definitively visualized, but unlikely that simultaneous terminal  ileitis presenting with appendicitis. however, if pt osmin increasing signs of sepsis /worsening abd pain, would call surgery

## 2023-06-15 NOTE — H&P ADULT - NSHPLABSRESULTS_GEN_ALL_CORE
10.8   11.45 )-----------( 391      ( 15 Caleb 2023 13:05 )             34.0     06-15    136  |  102  |  6<L>  ----------------------------<  79  4.2   |  24  |  0.66    Ca    9.5      15 Caleb 2023 13:05  Phos  3.4     06-15  Mg     2.10     06-15    TPro  7.3  /  Alb  4.0  /  TBili  <0.2  /  DBili  x   /  AST  25  /  ALT  37<H>  /  AlkPhos  181<H>  06-15    CAPILLARY BLOOD GLUCOSE            Vital Signs Last 24 Hrs  T(C): 36.4 (15 Caleb 2023 22:00), Max: 36.9 (15 Caleb 2023 20:42)  T(F): 97.5 (15 Caleb 2023 22:00), Max: 98.4 (15 Caleb 2023 20:42)  HR: 80 (15 Caleb 2023 22:00) (60 - 93)  BP: 120/66 (15 Caleb 2023 22:00) (102/60 - 120/66)  BP(mean): --  RR: 16 (15 Caleb 2023 22:00) (16 - 20)  SpO2: 100% (15 Caleb 2023 22:00) (99% - 100%)    Parameters below as of 15 Caleb 2023 22:00  Patient On (Oxygen Delivery Method): room air

## 2023-06-15 NOTE — ED ADULT NURSE NOTE - OBJECTIVE STATEMENT
Patient received in intake room 1. Patient A&Ox3 and ambulatory at baseline. Patient presents to the ED c/o diarrhea for approximately one month and right lower quadrant pain. phx colitis. Patient states she has been experiencing diarrhea, loose/watery stools for approximately one month. Patient states she has been following up with her GI specialist. Patient states last night and this morning, she started to experience right lower quadrant pain. Abodmen flat, soft and non-distended; upon palpation tenderness and firmness noted to right lower quadrant. Patient denies offers no complaints at this time. Patient deneis fever/chills, nausea/vomiting, headache, lightheadedness, and dizziness. Respirations even and unlabored, no signs/symptoms of acute distress; patient denies dyspnea, shortness of breath, and chest pain. 20G IV placed to left AC, labs collected and sent. Patient is stable at this time.

## 2023-06-15 NOTE — ED ADULT NURSE REASSESSMENT NOTE - NS ED NURSE REASSESS COMMENT FT1
PT is resting in stretcher, easily arousable to verbal stimuli. pt C/O pain, provider aware, will medicate as per provider orders. will continue to monitor.

## 2023-06-15 NOTE — ED PROVIDER NOTE - OBJECTIVE STATEMENT
Patient is a 22-year-old female with a history of colitis who presents to the ED with abdominal pain.  She notes that she was seen a month ago for multiple episodes of diarrhea and abdominal cramping, CT at that time showed colitis.  Since then, her bowel movements have decreased from 12 a day to 5-6 a day but she still notes daily diarrhea, loose stool, nonbloody.  She also sometimes has a feeling of fullness and is unable to have a bowel movement.  She denies any denies any nausea or vomiting, fevers or chills, lightheadedness or dizziness or fatigue.  Her sister has a history of Crohn's disease.  2 days ago, the patient started having right lower quadrant abdominal pain.  It has progressively been getting worse since then and is now a 9 out of 10.  She describes it as sharp and nonradiating.  The pain is constant.  Last menstrual period was 1 week ago, patient is sexually active, no vaginal discharge or dysuria or hematuria.

## 2023-06-15 NOTE — H&P ADULT - NSHPPHYSICALEXAM_GEN_ALL_CORE
PHYSICAL EXAM:      Constitutional: NAD, well-groomed, well-developed  HEENT:  EOMI, Normal Hearing  Neck: No LAD, No JVD  Back: Normal spine flexure, No CVA tenderness  Respiratory: CTAB  Cardiovascular: S1 and S2, RRR  Gastrointestinal: BS+, soft, qlq tenderness on soft palpation   Extremities: No peripheral edema  Vascular: 2+ peripheral pulses  Neurological: A/O x 3, no focal deficits  Psychiatric: Normal mood, normal affect  Musculoskeletal: 5/5 strength b/l upper and lower extremities  Skin: No rashes

## 2023-06-16 ENCOUNTER — APPOINTMENT (OUTPATIENT)
Dept: ORTHOPEDIC SURGERY | Facility: CLINIC | Age: 23
End: 2023-06-16

## 2023-06-16 DIAGNOSIS — R10.31 RIGHT LOWER QUADRANT PAIN: ICD-10-CM

## 2023-06-16 DIAGNOSIS — K52.9 NONINFECTIVE GASTROENTERITIS AND COLITIS, UNSPECIFIED: ICD-10-CM

## 2023-06-16 DIAGNOSIS — R74.8 ABNORMAL LEVELS OF OTHER SERUM ENZYMES: ICD-10-CM

## 2023-06-16 LAB
ALBUMIN SERPL ELPH-MCNC: 3.6 G/DL — SIGNIFICANT CHANGE UP (ref 3.3–5)
ALP SERPL-CCNC: 177 U/L — HIGH (ref 40–120)
ALT FLD-CCNC: 31 U/L — SIGNIFICANT CHANGE UP (ref 4–33)
ANION GAP SERPL CALC-SCNC: 14 MMOL/L — SIGNIFICANT CHANGE UP (ref 7–14)
APTT BLD: 29.8 SEC — SIGNIFICANT CHANGE UP (ref 27–36.3)
AST SERPL-CCNC: 24 U/L — SIGNIFICANT CHANGE UP (ref 4–32)
BASOPHILS # BLD AUTO: 0 K/UL — SIGNIFICANT CHANGE UP (ref 0–0.2)
BASOPHILS NFR BLD AUTO: 0 % — SIGNIFICANT CHANGE UP (ref 0–2)
BILIRUB SERPL-MCNC: 0.3 MG/DL — SIGNIFICANT CHANGE UP (ref 0.2–1.2)
BLD GP AB SCN SERPL QL: NEGATIVE — SIGNIFICANT CHANGE UP
BUN SERPL-MCNC: 5 MG/DL — LOW (ref 7–23)
CALCIUM SERPL-MCNC: 9 MG/DL — SIGNIFICANT CHANGE UP (ref 8.4–10.5)
CHLORIDE SERPL-SCNC: 101 MMOL/L — SIGNIFICANT CHANGE UP (ref 98–107)
CO2 SERPL-SCNC: 20 MMOL/L — LOW (ref 22–31)
CREAT SERPL-MCNC: 0.66 MG/DL — SIGNIFICANT CHANGE UP (ref 0.5–1.3)
EGFR: 126 ML/MIN/1.73M2 — SIGNIFICANT CHANGE UP
EOSINOPHIL # BLD AUTO: 0.24 K/UL — SIGNIFICANT CHANGE UP (ref 0–0.5)
EOSINOPHIL NFR BLD AUTO: 1.7 % — SIGNIFICANT CHANGE UP (ref 0–6)
GI PCR PANEL: SIGNIFICANT CHANGE UP
GLUCOSE SERPL-MCNC: 79 MG/DL — SIGNIFICANT CHANGE UP (ref 70–99)
HCT VFR BLD CALC: 32.8 % — LOW (ref 34.5–45)
HGB BLD-MCNC: 10.5 G/DL — LOW (ref 11.5–15.5)
IANC: 9.77 K/UL — HIGH (ref 1.8–7.4)
INR BLD: 1.32 RATIO — HIGH (ref 0.88–1.16)
LACTATE SERPL-SCNC: 0.6 MMOL/L — SIGNIFICANT CHANGE UP (ref 0.5–2)
LYMPHOCYTES # BLD AUTO: 21.7 % — SIGNIFICANT CHANGE UP (ref 13–44)
LYMPHOCYTES # BLD AUTO: 3.12 K/UL — SIGNIFICANT CHANGE UP (ref 1–3.3)
MCHC RBC-ENTMCNC: 28.8 PG — SIGNIFICANT CHANGE UP (ref 27–34)
MCHC RBC-ENTMCNC: 32 GM/DL — SIGNIFICANT CHANGE UP (ref 32–36)
MCV RBC AUTO: 89.9 FL — SIGNIFICANT CHANGE UP (ref 80–100)
MONOCYTES # BLD AUTO: 1.25 K/UL — HIGH (ref 0–0.9)
MONOCYTES NFR BLD AUTO: 8.7 % — SIGNIFICANT CHANGE UP (ref 2–14)
NEUTROPHILS # BLD AUTO: 9.64 K/UL — HIGH (ref 1.8–7.4)
NEUTROPHILS NFR BLD AUTO: 61.8 % — SIGNIFICANT CHANGE UP (ref 43–77)
PLATELET # BLD AUTO: 380 K/UL — SIGNIFICANT CHANGE UP (ref 150–400)
POTASSIUM SERPL-MCNC: 3.8 MMOL/L — SIGNIFICANT CHANGE UP (ref 3.5–5.3)
POTASSIUM SERPL-SCNC: 3.8 MMOL/L — SIGNIFICANT CHANGE UP (ref 3.5–5.3)
PROT SERPL-MCNC: 7 G/DL — SIGNIFICANT CHANGE UP (ref 6–8.3)
PROTHROM AB SERPL-ACNC: 15.4 SEC — HIGH (ref 10.5–13.4)
RBC # BLD: 3.65 M/UL — LOW (ref 3.8–5.2)
RBC # FLD: 14 % — SIGNIFICANT CHANGE UP (ref 10.3–14.5)
RH IG SCN BLD-IMP: POSITIVE — SIGNIFICANT CHANGE UP
SODIUM SERPL-SCNC: 135 MMOL/L — SIGNIFICANT CHANGE UP (ref 135–145)
WBC # BLD: 14.39 K/UL — HIGH (ref 3.8–10.5)
WBC # FLD AUTO: 14.39 K/UL — HIGH (ref 3.8–10.5)

## 2023-06-16 PROCEDURE — 99254 IP/OBS CNSLTJ NEW/EST MOD 60: CPT | Mod: GC

## 2023-06-16 PROCEDURE — 99233 SBSQ HOSP IP/OBS HIGH 50: CPT

## 2023-06-16 RX ORDER — MORPHINE SULFATE 50 MG/1
2 CAPSULE, EXTENDED RELEASE ORAL EVERY 4 HOURS
Refills: 0 | Status: DISCONTINUED | OUTPATIENT
Start: 2023-06-16 | End: 2023-06-20

## 2023-06-16 RX ORDER — ENOXAPARIN SODIUM 100 MG/ML
40 INJECTION SUBCUTANEOUS EVERY 24 HOURS
Refills: 0 | Status: DISCONTINUED | OUTPATIENT
Start: 2023-06-16 | End: 2023-06-18

## 2023-06-16 RX ORDER — ACETAMINOPHEN 500 MG
975 TABLET ORAL ONCE
Refills: 0 | Status: COMPLETED | OUTPATIENT
Start: 2023-06-16 | End: 2023-06-16

## 2023-06-16 RX ADMIN — MORPHINE SULFATE 2 MILLIGRAM(S): 50 CAPSULE, EXTENDED RELEASE ORAL at 21:18

## 2023-06-16 RX ADMIN — ENOXAPARIN SODIUM 40 MILLIGRAM(S): 100 INJECTION SUBCUTANEOUS at 16:03

## 2023-06-16 RX ADMIN — Medication 650 MILLIGRAM(S): at 02:00

## 2023-06-16 RX ADMIN — Medication 1 TABLET(S): at 12:34

## 2023-06-16 RX ADMIN — Medication 975 MILLIGRAM(S): at 13:34

## 2023-06-16 RX ADMIN — SODIUM CHLORIDE 100 MILLILITER(S): 9 INJECTION INTRAMUSCULAR; INTRAVENOUS; SUBCUTANEOUS at 06:28

## 2023-06-16 RX ADMIN — Medication 975 MILLIGRAM(S): at 12:34

## 2023-06-16 RX ADMIN — Medication 650 MILLIGRAM(S): at 02:30

## 2023-06-16 RX ADMIN — MORPHINE SULFATE 2 MILLIGRAM(S): 50 CAPSULE, EXTENDED RELEASE ORAL at 16:02

## 2023-06-16 RX ADMIN — MORPHINE SULFATE 2 MILLIGRAM(S): 50 CAPSULE, EXTENDED RELEASE ORAL at 16:24

## 2023-06-16 RX ADMIN — MORPHINE SULFATE 2 MILLIGRAM(S): 50 CAPSULE, EXTENDED RELEASE ORAL at 20:48

## 2023-06-16 RX ADMIN — MORPHINE SULFATE 2 MILLIGRAM(S): 50 CAPSULE, EXTENDED RELEASE ORAL at 11:15

## 2023-06-16 RX ADMIN — MORPHINE SULFATE 2 MILLIGRAM(S): 50 CAPSULE, EXTENDED RELEASE ORAL at 10:58

## 2023-06-16 NOTE — CONSULT NOTE ADULT - SUBJECTIVE AND OBJECTIVE BOX
HPI:  21 yo F with no significant PMHx presenting with abdominal pain. Patient states she developed lower abdominal cramping and nonbloody diarrhea over a month ago. She presented to the ED on 5/20 with these symptoms at which time she was found to have pancolitis on CT. She was discharged on azithromycin to f/u with GI.        Allergies:  No Known Allergies    Home Medications:    Hospital Medications:  acetaminophen     Tablet .. 650 milliGRAM(s) Oral every 6 hours PRN  lactobacillus acidophilus 1 Tablet(s) Oral daily  morphine  - Injectable 2 milliGRAM(s) IV Push every 6 hours PRN  sodium chloride 0.9%. 1000 milliLiter(s) IV Continuous <Continuous>      PMHX/PSHX:  Unspecified dislocation of right thumb, sequela    No significant past surgical history    Family history:  No pertinent family history in first degree relatives      Denies family history of colon cancer/polyps, stomach cancer/polyps, pancreatic cancer/masses, liver cancer/disease, ovarian cancer and endometrial cancer.    Social History:   Tob: Denies  EtOH: Denies  Illicit Drugs: Denies    ROS:   General:  No wt loss, fevers, chills, night sweats, fatigue  Eyes:  Good vision, no reported pain  ENT:  No sore throat, pain, runny nose, dysphagia  CV:  No pain, palpitations, hypo/hypertension  Pulm:  No dyspnea, cough, tachypnea, wheezing  GI:  see HPI  :  No pain, bleeding, incontinence, nocturia  Muscle:  No pain, weakness  Neuro:  No weakness, tingling, memory problems  Psych:  No fatigue, insomnia, mood problems, depression  Endocrine:  No polyuria, polydipsia, cold/heat intolerance  Heme:  No petechiae, ecchymosis, easy bruisability  Skin:  No rash, tattoos, scars, edema    PHYSICAL EXAM:   GENERAL:  No acute distress  HEENT:  NCAT, no scleral icterus   CHEST:  no respiratory distress  HEART:  Regular rate and rhythm  ABDOMEN:  Soft, non-tender, non-distended, normoactive bowel sounds,  no masses  EXTREMITIES: No edema  SKIN:  No rash/erythema/ecchymoses/petechiae/wounds/abscess/warm/dry  NEURO:  Alert and oriented x 3, no asterixis    Vital Signs:  Vital Signs Last 24 Hrs  T(C): 36.8 (16 Jun 2023 06:01), Max: 36.9 (15 Caleb 2023 20:42)  T(F): 98.3 (16 Jun 2023 06:01), Max: 98.4 (15 Caleb 2023 20:42)  HR: 83 (16 Jun 2023 06:01) (60 - 95)  BP: 131/68 (16 Jun 2023 06:01) (102/60 - 131/68)  BP(mean): --  RR: 18 (16 Jun 2023 06:01) (16 - 20)  SpO2: 100% (16 Jun 2023 06:01) (98% - 100%)    Parameters below as of 16 Jun 2023 06:01  Patient On (Oxygen Delivery Method): room air      Daily Height in cm: 167.64 (16 Jun 2023 01:40)    Daily     LABS:                        10.5   14.39 )-----------( 380      ( 16 Jun 2023 06:44 )             32.8     Mean Cell Volume: 89.9 fL (06-16-23 @ 06:44)    06-16    135  |  101  |  5<L>  ----------------------------<  79  3.8   |  20<L>  |  0.66    Ca    9.0      16 Jun 2023 06:44  Phos  3.4     06-15  Mg     2.10     06-15    TPro  7.0  /  Alb  3.6  /  TBili  0.3  /  DBili  x   /  AST  24  /  ALT  31  /  AlkPhos  177<H>  06-16    LIVER FUNCTIONS - ( 16 Jun 2023 06:44 )  Alb: 3.6 g/dL / Pro: 7.0 g/dL / ALK PHOS: 177 U/L / ALT: 31 U/L / AST: 24 U/L / GGT: x           PT/INR - ( 16 Jun 2023 06:44 )   PT: 15.4 sec;   INR: 1.32 ratio         PTT - ( 16 Jun 2023 06:44 )  PTT:29.8 sec    Amylase Serum--      Lipase serum47       Ammonia--                          10.5   14.39 )-----------( 380      ( 16 Jun 2023 06:44 )             32.8                         10.8   11.45 )-----------( 391      ( 15 Caleb 2023 13:05 )             34.0       Imaging:             HPI:  23 yo F with no significant PMHx presenting with abdominal pain. Patient states she developed lower abdominal cramping and nonbloody diarrhea over a month ago. She presented to the ED on 5/20 with these symptoms at which time she was found to have pancolitis on CT. She was discharged on azithromycin to f/u with GI. Since then, patient has been taking a daily probiotic and has been eating a bland diet with improvement in her diarrhea from 12 BMs/day to about 4 BMs/day, still diarrhea. She has also had improvement in her lower abdominal cramping. However, over the past week, she developed new 7/10 sharp RLQ abdominal pain which is new, worse w/ movement.  She also states she has had 15 lb weight loss over the past month. Has itchy rash on elbow and hand which she states she has had for years; no joint pain. Sister has Crohn's disease. No fevers, chills, nausea/vomiting, melena, dysphagia, odynophagia.    CT A/P IVC this admission showing pancolitis +ileitis.     Allergies:  No Known Allergies    Home Medications:    Hospital Medications:  acetaminophen     Tablet .. 650 milliGRAM(s) Oral every 6 hours PRN  lactobacillus acidophilus 1 Tablet(s) Oral daily  morphine  - Injectable 2 milliGRAM(s) IV Push every 6 hours PRN  sodium chloride 0.9%. 1000 milliLiter(s) IV Continuous <Continuous>    PMHX/PSHX:  Unspecified dislocation of right thumb, sequela    No significant past surgical history    Family history:  Sister - Crohn's disease    Denies family history of colon cancer/polyps, stomach cancer/polyps, pancreatic cancer/masses, liver cancer/disease, ovarian cancer and endometrial cancer.    Social History:   Tob: Denies  EtOH: Denies  Illicit Drugs: Denies    ROS:   General:  No wt loss, fevers, chills, night sweats, fatigue  Eyes:  Good vision, no reported pain  ENT:  No sore throat, pain, runny nose, dysphagia  CV:  No pain, palpitations, hypo/hypertension  Pulm:  No dyspnea, cough, tachypnea, wheezing  GI:  see HPI  :  No pain, bleeding, incontinence, nocturia  Muscle:  No pain, weakness  Neuro:  No weakness, tingling, memory problems  Psych:  No fatigue, insomnia, mood problems, depression  Endocrine:  No polyuria, polydipsia, cold/heat intolerance  Heme:  No petechiae, ecchymosis, easy bruisability  Skin:  No rash, tattoos, scars, edema    PHYSICAL EXAM:   GENERAL:  No acute distress  HEENT:  NCAT, no scleral icterus   CHEST:  no respiratory distress  HEART:  Regular rate and rhythm  ABDOMEN:  Soft, TTP in RLQ, nondistended  EXTREMITIES: No edema  SKIN:  scaly erythematous rash on L elbow and R hand  NEURO:  Alert and oriented x 3     Vital Signs:  Vital Signs Last 24 Hrs  T(C): 36.8 (16 Jun 2023 06:01), Max: 36.9 (15 Caleb 2023 20:42)  T(F): 98.3 (16 Jun 2023 06:01), Max: 98.4 (15 Caleb 2023 20:42)  HR: 83 (16 Jun 2023 06:01) (60 - 95)  BP: 131/68 (16 Jun 2023 06:01) (102/60 - 131/68)  BP(mean): --  RR: 18 (16 Jun 2023 06:01) (16 - 20)  SpO2: 100% (16 Jun 2023 06:01) (98% - 100%)    Parameters below as of 16 Jun 2023 06:01  Patient On (Oxygen Delivery Method): room air      Daily Height in cm: 167.64 (16 Jun 2023 01:40)    Daily     LABS:                        10.5   14.39 )-----------( 380      ( 16 Jun 2023 06:44 )             32.8     Mean Cell Volume: 89.9 fL (06-16-23 @ 06:44)    06-16    135  |  101  |  5<L>  ----------------------------<  79  3.8   |  20<L>  |  0.66    Ca    9.0      16 Jun 2023 06:44  Phos  3.4     06-15  Mg     2.10     06-15    TPro  7.0  /  Alb  3.6  /  TBili  0.3  /  DBili  x   /  AST  24  /  ALT  31  /  AlkPhos  177<H>  06-16    LIVER FUNCTIONS - ( 16 Jun 2023 06:44 )  Alb: 3.6 g/dL / Pro: 7.0 g/dL / ALK PHOS: 177 U/L / ALT: 31 U/L / AST: 24 U/L / GGT: x           PT/INR - ( 16 Jun 2023 06:44 )   PT: 15.4 sec;   INR: 1.32 ratio         PTT - ( 16 Jun 2023 06:44 )  PTT:29.8 sec    Amylase Serum--      Lipase serum47       Ammonia--                          10.5   14.39 )-----------( 380      ( 16 Jun 2023 06:44 )             32.8                         10.8   11.45 )-----------( 391      ( 15 Caleb 2023 13:05 )             34.0       Imaging:  ACC: [id number] EXAM:  CT ABDOMEN AND PELVIS IC   ORDERED BY: TADEO VELÁZQUEZ     PROCEDURE DATE:  06/15/2023      INTERPRETATION:  CLINICAL INFORMATION: Right lower quadrant pain,   diarrhea.    COMPARISON: CT abdomen and pelvis 5/20/2023    CONTRAST/COMPLICATIONS:  IV Contrast: Omnipaque 350  80 cc administered   20 cc discarded  Oral Contrast: NONE  Complications: None reported at time of study completion    PROCEDURE:  CT of the Abdomen and Pelvis was performed.  Sagittal and coronal reformats were performed.    FINDINGS:  LOWER CHEST: Within normal limits.    LIVER: Unchanged hepatomegaly. Mild heterogeneous enhancement in the   right lobe most conspicuous inferiorly.  BILE DUCTS: Normal caliber.  GALLBLADDER: Contracted.  SPLEEN: Within normal limits.  PANCREAS: Within normal limits.  ADRENALS: Within normal limits.  KIDNEYS/URETERS: Bilateral subcentimeter hypodense renal foci that are   too small to characterize. No hydronephrosis.    BLADDER: Underdistended.  REPRODUCTIVE ORGANS: Uterus and adnexa within normal limits.    BOWEL: Diffuse colonic wall thickening spanning the cecum to the rectum,   similar in appearance to the previous exam of 5/20/2023. There is also   mild mural thickening of approximately 2.5 cm of the terminal ileum, also   similar to the prior exam. Pericolonic fat stranding most prominently   about the right colon. No bowel obstruction. The appendix is not   definitively visualized, with a possible normal-appearing candidate seen   on series 2 image 87.  PERITONEUM: Trace pelvic ascites.  VESSELS: Within normal limits.  RETROPERITONEUM/LYMPH NODES: Prominent right lower quadrant mesenteric   lymph nodes, which may be reactive. For reference, a lymph node measuring   1.8 x 1.0 cm is seen on series 2 image 73.  ABDOMINAL WALL: Within normal limits.  BONES: Minimal degenerative changes.    IMPRESSION:  Pancolitis. Wall thickening of the terminal ileum, which may be reactive   or reflective of terminal ileitis.    Mild heterogeneous enhancement of the right hepatic lobe of uncertain   etiology or clinical significance. Suggest correlation with liver   function tests.    --- End of Report ---    RAMONE RATLIFF MD; Resident Radiologist  This document has been electronically signed.  OMID LEE MD; Attending Radiologist  This document has been electronically signed. Caleb 15 2023  3:35PM

## 2023-06-16 NOTE — PROGRESS NOTE ADULT - SUBJECTIVE AND OBJECTIVE BOX
23 yo f presents with RLQ abd pain with non-blood diarrhea for a month.     Subjective: NAEO. admitted overnight for complaints of severe RLQ abd pain, associated with >3 episodes of non-bloody diarrhea for about a month. pt reports family hx of Crohns disease in sister. Denies family hx of cancer. pt denies travel history. denies any fever/chills. no travel history.  Overnight, pain is improved with morphine. reports morphine only lasts for couple hours. last bm was this morning, denies fever/chills, no nausea/vomiting. able to tolerate clear liquid diet, with plans to advance as tolerated.     MEDICATIONS  (STANDING):  enoxaparin Injectable 40 milliGRAM(s) SubCutaneous every 24 hours  lactobacillus acidophilus 1 Tablet(s) Oral daily  sodium chloride 0.9%. 1000 milliLiter(s) (100 mL/Hr) IV Continuous <Continuous>    MEDICATIONS  (PRN):  acetaminophen     Tablet .. 650 milliGRAM(s) Oral every 6 hours PRN Moderate Pain (4 - 6)  morphine  - Injectable 2 milliGRAM(s) IV Push every 4 hours PRN Severe Pain (7 - 10)    VITAL SIGNS:  T(C): 36.8 (06-16-23 @ 06:01), Max: 36.9 (06-15-23 @ 20:42)  T(F): 98.3 (06-16-23 @ 06:01), Max: 98.4 (06-15-23 @ 20:42)  HR: 83 (06-16-23 @ 06:01) (60 - 95)  BP: 110/68 (06-16-23 @ 11:02) (102/60 - 131/68)  BP(mean): --  RR: 17 (06-16-23 @ 11:02) (16 - 18)  SpO2: 99% (06-16-23 @ 11:02) (98% - 100%)  Wt(kg): --    PHYSICAL EXAM:  Constitutional: WDWN, mild distress dt pain  Head: NC/AT  Eyes: PERRL, EOMI, anicteric sclera  Neck: supple; no JVD  Respiratory: CTA B/L; no W/R/R  Cardiac: +S1/S2; RRR; no M/R/G  Gastrointestinal: soft, tender to palpation on the RLQ, no rebound tenderness or guarding  Extremities: WWP, no clubbing or cyanosis; no peripheral edema  Musculoskeletal: NROM x4; no joint swelling, tenderness or erythema  Vascular: 2+ radial, DP/PT pulses B/L  Neurologic: AAOx3; CNII-XII grossly intact; no focal deficits    LABS:                        10.5   14.39 )-----------( 380      ( 16 Jun 2023 06:44 )             32.8     06-16    135  |  101  |  5<L>  ----------------------------<  79  3.8   |  20<L>  |  0.66    Ca    9.0      16 Jun 2023 06:44  Phos  3.4     06-15  Mg     2.10     06-15    TPro  7.0  /  Alb  3.6  /  TBili  0.3  /  DBili  x   /  AST  24  /  ALT  31  /  AlkPhos  177<H>  06-16    PT/INR - ( 16 Jun 2023 06:44 )   PT: 15.4 sec;   INR: 1.32 ratio         PTT - ( 16 Jun 2023 06:44 )  PTT:29.8 sec    CAPILLARY BLOOD GLUCOSE          RADIOLOGY & ADDITIONAL TESTS: Reviewed.     CT ABD/PELVIS:  Pancolitis. Wall thickening of the terminal ileum, which may be reactive   or reflective of terminal ileitis.    Mild heterogeneous enhancement of the right hepatic lobe of uncertain   etiology or clinical significance. Suggest correlation with liver   function tests.

## 2023-06-16 NOTE — PROGRESS NOTE ADULT - PROBLEM SELECTOR PLAN 1
pw RLQ abdominal pain with diarrhea for a month. CT abd/pelvis with Pancolitis. Wall thickening of the terminal ileum, which may be reactive or reflective of terminal ileitis.  afebrile, elev WBC. HDS.   concern for possible IBD although infectious etiology cannot be ruled out  - fu stool studies, count and GI pcr, C.diff pcr. fecal calprotein  - fu ESR/CRP, HEP panel  - GI consulted, appreciate recs  - per GI plan for colonoscopy on Monday, npo sunday midnight   - cont with pain control with Morphine 2mg q4hr   - cont with clear liquid diet, advance as tolerated

## 2023-06-16 NOTE — PATIENT PROFILE ADULT - FUNCTIONAL ASSESSMENT - BASIC MOBILITY 6.
4-calculated by average /Not able to assess (calculate score using [facility] averaging method)

## 2023-06-16 NOTE — CONSULT NOTE ADULT - ASSESSMENT
23 yo F with no significant PMHx presenting with abdominal pain. Patient states she developed lower abdominal cramping and nonbloody diarrhea over a month ago. She presented to the ED on 5/20 with these symptoms at which time she was found to have pancolitis on CT.     #Pancolitis: Patient presents with one month of nonbloody diarrhea, abdominal pain, weight loss. CT shows pancolitis with ileitis, similar to CT from ED visit on 5/19. Concern for Crohn's disease vs infectious colitis (less likely) vs ischemic vs malignancy vs other    #Heterogeneous enhancement of R hepatic lobe on CT, elevated ALP. Findings not seen on prior CT.      Recommendations  - send GI PCR, C diff  - quantiferon gold and HBV core Ab, sAg, sAb  - CRP  - DVT ppx given high risk of developing DVT in IBD flare  - consider MR for evaluation of liver findings on CT given elevated ALP    GI will continue to follow.     All recommendations are tentative until note is attested by attending.     Meche Juarez, PGY5  Gastroenterology/Hepatology Fellow  Available on [employer] Teams  [phone] ([employer] Short Range Pager)  [phone] (Long Range Pager)    After 5pm, please contact the on-call GI fellow. [phone] 23 yo F with no significant PMHx presenting with abdominal pain. Patient states she developed lower abdominal cramping and nonbloody diarrhea over a month ago. She presented to the ED on 5/20 with these symptoms at which time she was found to have pancolitis on CT.     #Pancolitis: Patient presents with one month of nonbloody diarrhea, abdominal pain, weight loss. CT shows pancolitis with ileitis, similar to CT from ED visit on 5/19. Concern for Crohn's disease vs infectious colitis (less likely) vs ischemic vs malignancy vs other    #Heterogeneous enhancement of R hepatic lobe on CT, elevated ALP. Findings not seen on prior CT.      Recommendations  - send GI PCR, C diff  - quantiferon gold and HBV core Ab, sAg, sAb  - CRP  - DVT ppx given high risk of developing DVT in IBD flare  - consider MR for evaluation of liver findings on CT given elevated ALP  - clear liquid diet Sunday, NPO after MN on Sunday night  - plan for colonoscopy Monday  - please ensure patient completes bowel prep    GI will continue to follow.     All recommendations are tentative until note is attested by attending.     Meche Juarez, PGY5  Gastroenterology/Hepatology Fellow  Available on [employer] Teams  [phone] ([facility] Short Range Pager)  [phone] (Long Range Pager)    After 5pm, please contact the on-call GI fellow. [phone]

## 2023-06-16 NOTE — CONSULT NOTE ADULT - ATTENDING COMMENTS
22F with 1 month of diarrhea, with more recent development of R sided abdominal pain. Sister with Crohn's disease. Has had 15 lb weight loss in this time frame. Also reporting low back pain. On exam, R sided ttp. Imaging findings per fellow note. Would get GI PCR and C. diff as rule out, but strongly suspect new IBD. Plan for colonoscopy on Monday to further assess. Would also XR SI joints given pain and possibility of concamitant sacroiliitis.

## 2023-06-17 LAB
ALBUMIN SERPL ELPH-MCNC: 3.9 G/DL — SIGNIFICANT CHANGE UP (ref 3.3–5)
ALP SERPL-CCNC: 209 U/L — HIGH (ref 40–120)
ALT FLD-CCNC: 38 U/L — HIGH (ref 4–33)
ANION GAP SERPL CALC-SCNC: 15 MMOL/L — HIGH (ref 7–14)
APPEARANCE UR: CLEAR — SIGNIFICANT CHANGE UP
AST SERPL-CCNC: 24 U/L — SIGNIFICANT CHANGE UP (ref 4–32)
BASOPHILS # BLD AUTO: 0.03 K/UL — SIGNIFICANT CHANGE UP (ref 0–0.2)
BASOPHILS NFR BLD AUTO: 0.2 % — SIGNIFICANT CHANGE UP (ref 0–2)
BILIRUB SERPL-MCNC: 0.4 MG/DL — SIGNIFICANT CHANGE UP (ref 0.2–1.2)
BILIRUB UR-MCNC: NEGATIVE — SIGNIFICANT CHANGE UP
BUN SERPL-MCNC: 5 MG/DL — LOW (ref 7–23)
C DIFF BY PCR RESULT: SIGNIFICANT CHANGE UP
CALCIUM SERPL-MCNC: 9.2 MG/DL — SIGNIFICANT CHANGE UP (ref 8.4–10.5)
CHLORIDE SERPL-SCNC: 100 MMOL/L — SIGNIFICANT CHANGE UP (ref 98–107)
CO2 SERPL-SCNC: 21 MMOL/L — LOW (ref 22–31)
COLOR SPEC: COLORLESS — SIGNIFICANT CHANGE UP
CREAT SERPL-MCNC: 0.64 MG/DL — SIGNIFICANT CHANGE UP (ref 0.5–1.3)
CRP SERPL-MCNC: 141.1 MG/L — HIGH
DIFF PNL FLD: NEGATIVE — SIGNIFICANT CHANGE UP
EGFR: 126 ML/MIN/1.73M2 — SIGNIFICANT CHANGE UP
EOSINOPHIL # BLD AUTO: 0.19 K/UL — SIGNIFICANT CHANGE UP (ref 0–0.5)
EOSINOPHIL NFR BLD AUTO: 1.6 % — SIGNIFICANT CHANGE UP (ref 0–6)
GLUCOSE SERPL-MCNC: 83 MG/DL — SIGNIFICANT CHANGE UP (ref 70–99)
GLUCOSE UR QL: NEGATIVE — SIGNIFICANT CHANGE UP
HBV CORE AB SER-ACNC: SIGNIFICANT CHANGE UP
HBV SURFACE AB SER-ACNC: REACTIVE
HBV SURFACE AG SER-ACNC: SIGNIFICANT CHANGE UP
HCT VFR BLD CALC: 35 % — SIGNIFICANT CHANGE UP (ref 34.5–45)
HGB BLD-MCNC: 11.5 G/DL — SIGNIFICANT CHANGE UP (ref 11.5–15.5)
IANC: 7.11 K/UL — SIGNIFICANT CHANGE UP (ref 1.8–7.4)
IMM GRANULOCYTES NFR BLD AUTO: 0.3 % — SIGNIFICANT CHANGE UP (ref 0–0.9)
KETONES UR-MCNC: NEGATIVE — SIGNIFICANT CHANGE UP
LEUKOCYTE ESTERASE UR-ACNC: NEGATIVE — SIGNIFICANT CHANGE UP
LYMPHOCYTES # BLD AUTO: 2.88 K/UL — SIGNIFICANT CHANGE UP (ref 1–3.3)
LYMPHOCYTES # BLD AUTO: 23.7 % — SIGNIFICANT CHANGE UP (ref 13–44)
MAGNESIUM SERPL-MCNC: 1.9 MG/DL — SIGNIFICANT CHANGE UP (ref 1.6–2.6)
MCHC RBC-ENTMCNC: 29.1 PG — SIGNIFICANT CHANGE UP (ref 27–34)
MCHC RBC-ENTMCNC: 32.9 GM/DL — SIGNIFICANT CHANGE UP (ref 32–36)
MCV RBC AUTO: 88.6 FL — SIGNIFICANT CHANGE UP (ref 80–100)
MONOCYTES # BLD AUTO: 1.9 K/UL — HIGH (ref 0–0.9)
MONOCYTES NFR BLD AUTO: 15.6 % — HIGH (ref 2–14)
NEUTROPHILS # BLD AUTO: 7.11 K/UL — SIGNIFICANT CHANGE UP (ref 1.8–7.4)
NEUTROPHILS NFR BLD AUTO: 58.6 % — SIGNIFICANT CHANGE UP (ref 43–77)
NITRITE UR-MCNC: NEGATIVE — SIGNIFICANT CHANGE UP
NRBC # BLD: 0 /100 WBCS — SIGNIFICANT CHANGE UP (ref 0–0)
NRBC # FLD: 0 K/UL — SIGNIFICANT CHANGE UP (ref 0–0)
PH UR: 6 — SIGNIFICANT CHANGE UP (ref 5–8)
PHOSPHATE SERPL-MCNC: 4 MG/DL — SIGNIFICANT CHANGE UP (ref 2.5–4.5)
PLATELET # BLD AUTO: 384 K/UL — SIGNIFICANT CHANGE UP (ref 150–400)
POTASSIUM SERPL-MCNC: 3.6 MMOL/L — SIGNIFICANT CHANGE UP (ref 3.5–5.3)
POTASSIUM SERPL-SCNC: 3.6 MMOL/L — SIGNIFICANT CHANGE UP (ref 3.5–5.3)
PROT SERPL-MCNC: 7.5 G/DL — SIGNIFICANT CHANGE UP (ref 6–8.3)
PROT UR-MCNC: NEGATIVE — SIGNIFICANT CHANGE UP
RBC # BLD: 3.95 M/UL — SIGNIFICANT CHANGE UP (ref 3.8–5.2)
RBC # FLD: 13.8 % — SIGNIFICANT CHANGE UP (ref 10.3–14.5)
SODIUM SERPL-SCNC: 136 MMOL/L — SIGNIFICANT CHANGE UP (ref 135–145)
SP GR SPEC: 1.01 — LOW (ref 1.01–1.05)
UROBILINOGEN FLD QL: SIGNIFICANT CHANGE UP
WBC # BLD: 12.15 K/UL — HIGH (ref 3.8–10.5)
WBC # FLD AUTO: 12.15 K/UL — HIGH (ref 3.8–10.5)

## 2023-06-17 PROCEDURE — 72202 X-RAY EXAM SI JOINTS 3/> VWS: CPT | Mod: 26

## 2023-06-17 PROCEDURE — 99233 SBSQ HOSP IP/OBS HIGH 50: CPT

## 2023-06-17 RX ORDER — HYOSCYAMINE SULFATE 0.13 MG
0.12 TABLET ORAL ONCE
Refills: 0 | Status: DISCONTINUED | OUTPATIENT
Start: 2023-06-17 | End: 2023-06-17

## 2023-06-17 RX ORDER — POTASSIUM CHLORIDE 20 MEQ
40 PACKET (EA) ORAL ONCE
Refills: 0 | Status: COMPLETED | OUTPATIENT
Start: 2023-06-17 | End: 2023-06-17

## 2023-06-17 RX ADMIN — Medication 1 TABLET(S): at 13:52

## 2023-06-17 RX ADMIN — MORPHINE SULFATE 2 MILLIGRAM(S): 50 CAPSULE, EXTENDED RELEASE ORAL at 22:04

## 2023-06-17 RX ADMIN — ENOXAPARIN SODIUM 40 MILLIGRAM(S): 100 INJECTION SUBCUTANEOUS at 16:26

## 2023-06-17 RX ADMIN — MORPHINE SULFATE 2 MILLIGRAM(S): 50 CAPSULE, EXTENDED RELEASE ORAL at 22:20

## 2023-06-17 RX ADMIN — MORPHINE SULFATE 2 MILLIGRAM(S): 50 CAPSULE, EXTENDED RELEASE ORAL at 09:48

## 2023-06-17 RX ADMIN — MORPHINE SULFATE 2 MILLIGRAM(S): 50 CAPSULE, EXTENDED RELEASE ORAL at 00:50

## 2023-06-17 RX ADMIN — MORPHINE SULFATE 2 MILLIGRAM(S): 50 CAPSULE, EXTENDED RELEASE ORAL at 13:52

## 2023-06-17 RX ADMIN — MORPHINE SULFATE 2 MILLIGRAM(S): 50 CAPSULE, EXTENDED RELEASE ORAL at 01:20

## 2023-06-17 RX ADMIN — MORPHINE SULFATE 2 MILLIGRAM(S): 50 CAPSULE, EXTENDED RELEASE ORAL at 18:02

## 2023-06-17 RX ADMIN — MORPHINE SULFATE 2 MILLIGRAM(S): 50 CAPSULE, EXTENDED RELEASE ORAL at 06:04

## 2023-06-17 RX ADMIN — MORPHINE SULFATE 2 MILLIGRAM(S): 50 CAPSULE, EXTENDED RELEASE ORAL at 10:05

## 2023-06-17 RX ADMIN — Medication 40 MILLIEQUIVALENT(S): at 13:52

## 2023-06-17 RX ADMIN — Medication 30 MILLILITER(S): at 16:25

## 2023-06-17 RX ADMIN — MORPHINE SULFATE 2 MILLIGRAM(S): 50 CAPSULE, EXTENDED RELEASE ORAL at 18:30

## 2023-06-17 RX ADMIN — MORPHINE SULFATE 2 MILLIGRAM(S): 50 CAPSULE, EXTENDED RELEASE ORAL at 05:34

## 2023-06-17 RX ADMIN — MORPHINE SULFATE 2 MILLIGRAM(S): 50 CAPSULE, EXTENDED RELEASE ORAL at 14:15

## 2023-06-17 NOTE — PROGRESS NOTE ADULT - PROBLEM SELECTOR PLAN 1
pw RLQ abdominal pain with diarrhea for a month. CT abd/pelvis with Pancolitis. Wall thickening of the terminal ileum, which may be reactive or reflective of terminal ileitis.  afebrile, elev WBC. HDS.   concern for possible IBD although infectious etiology cannot be ruled out  - GI PCR negative, fu stool count, C.diff pcr. fecal calprotein  - CRP elevated, HEP panel pending   - GI consulted, appreciate recs  - per GI plan for colonoscopy on Monday, npo sunday midnight   - cont with pain control with Morphine 2mg q4hr   - cont with regular diet as tolerated, Clear liquid tomorrrow and npo midnight 6/18

## 2023-06-17 NOTE — CHART NOTE - NSCHARTNOTEFT_GEN_A_CORE
Pt complaining of right sided flank and lower abdomen pain, worse with urination. Pt also complaining of abdominal cramping.   Pt has been receiving morphine for pain.     VSS  Abdomen TTP right lower quadrant     UA ordered     Discussed with Dr. Marjorie Leung PA-C  Department of Medicine  Pager [phone] Pt complaining of right sided flank and lower abdomen pain, worse with urination. Pt also complaining of abdominal cramping.   Pt has been receiving morphine for pain.     VSS  Abdomen TTP right lower quadrant     UA ordered   Levsin x 1 ordered for abdominal cramping, ok to order per GI     Discussed with Dr. Marjorie Leung PA-C  Department of Medicine  Pager [phone] Pt complaining of right sided flank and lower abdomen pain, worse with urination. Pt also complaining of abdominal cramping.   Pt has been receiving morphine for pain.     VSS  Abdomen TTP right lower quadrant     UA ordered     Discussed with Dr. Marjorie Leung PA-C  Department of Medicine  Pager [phone]

## 2023-06-17 NOTE — PROGRESS NOTE ADULT - SUBJECTIVE AND OBJECTIVE BOX
23 yo f presents with RLQ abd pain with non-blood diarrhea for a month.     Subjective: NAEO.still with RLQ abd pain, can occasionally move to mid-abdomen to epigastrium. reports pain is manageable with Morphine. denies fever/chills. continues to have 2-3 episodes of non-bloody diarrhea. able to tolerate regular solid diet this morning.     MEDICATIONS  (STANDING):  enoxaparin Injectable 40 milliGRAM(s) SubCutaneous every 24 hours  lactobacillus acidophilus 1 Tablet(s) Oral daily  potassium chloride   Powder 40 milliEquivalent(s) Oral once  sodium chloride 0.9%. 1000 milliLiter(s) (100 mL/Hr) IV Continuous <Continuous>    MEDICATIONS  (PRN):  acetaminophen     Tablet .. 650 milliGRAM(s) Oral every 6 hours PRN Moderate Pain (4 - 6)  morphine  - Injectable 2 milliGRAM(s) IV Push every 4 hours PRN Severe Pain (7 - 10)    VITAL SIGNS:  T(C): 36.8 (16 Jun 2023 20:48), Max: 36.8 (16 Jun 2023 20:48)  T(F): 98.2 (16 Jun 2023 20:48), Max: 98.2 (16 Jun 2023 20:48)  HR: 93 (16 Jun 2023 20:48) (93 - 94)  BP: 119/63 (16 Jun 2023 20:48) (119/63 - 129/67)  BP(mean): --  RR: 17 (16 Jun 2023 20:48) (17 - 17)  SpO2: 98% (16 Jun 2023 20:48) (98% - 100%)    Parameters below as of 16 Jun 2023 20:48  Patient On (Oxygen Delivery Method): room air    PHYSICAL EXAM:  Constitutional: WDWN, mild distress dt pain  Head: NC/AT  Eyes: PERRL, EOMI, anicteric sclera  Neck: supple; no JVD  Respiratory: CTA B/L; no W/R/R  Cardiac: +S1/S2; RRR; no M/R/G  Gastrointestinal: soft, tender to palpation on the RLQ, no rebound tenderness or guarding  Extremities: WWP, no clubbing or cyanosis; no peripheral edema  Musculoskeletal: NROM x4; no joint swelling, tenderness or erythema  Vascular: 2+ radial, DP/PT pulses B/L  Neurologic: AAOx3; CNII-XII grossly intact; no focal deficits    LABS:                          11.5   12.15 )-----------( 384      ( 17 Jun 2023 06:42 )             35.0     06-17    136  |  100  |  5<L>  ----------------------------<  83  3.6   |  21<L>  |  0.64    Ca    9.2      17 Jun 2023 06:42  Phos  4.0     06-17  Mg     1.90     06-17    TPro  7.5  /  Alb  3.9  /  TBili  0.4  /  DBili  x   /  AST  24  /  ALT  38<H>  /  AlkPhos  209<H>  06-17    PT/INR - ( 16 Jun 2023 06:44 )   PT: 15.4 sec;   INR: 1.32 ratio         PTT - ( 16 Jun 2023 06:44 )  PTT:29.8 sec    CAPILLARY BLOOD GLUCOSE    RADIOLOGY & ADDITIONAL TESTS: Reviewed.     CT ABD/PELVIS:  Pancolitis. Wall thickening of the terminal ileum, which may be reactive   or reflective of terminal ileitis.    Mild heterogeneous enhancement of the right hepatic lobe of uncertain   etiology or clinical significance. Suggest correlation with liver   function tests.

## 2023-06-18 LAB
ALBUMIN SERPL ELPH-MCNC: 3.9 G/DL — SIGNIFICANT CHANGE UP (ref 3.3–5)
ALP SERPL-CCNC: 232 U/L — HIGH (ref 40–120)
ALT FLD-CCNC: 43 U/L — HIGH (ref 4–33)
ANION GAP SERPL CALC-SCNC: 16 MMOL/L — HIGH (ref 7–14)
AST SERPL-CCNC: 22 U/L — SIGNIFICANT CHANGE UP (ref 4–32)
BASOPHILS # BLD AUTO: 0.05 K/UL — SIGNIFICANT CHANGE UP (ref 0–0.2)
BASOPHILS NFR BLD AUTO: 0.4 % — SIGNIFICANT CHANGE UP (ref 0–2)
BILIRUB SERPL-MCNC: 0.4 MG/DL — SIGNIFICANT CHANGE UP (ref 0.2–1.2)
BLD GP AB SCN SERPL QL: NEGATIVE — SIGNIFICANT CHANGE UP
BUN SERPL-MCNC: 4 MG/DL — LOW (ref 7–23)
CALCIUM SERPL-MCNC: 9.5 MG/DL — SIGNIFICANT CHANGE UP (ref 8.4–10.5)
CHLORIDE SERPL-SCNC: 98 MMOL/L — SIGNIFICANT CHANGE UP (ref 98–107)
CO2 SERPL-SCNC: 20 MMOL/L — LOW (ref 22–31)
CREAT SERPL-MCNC: 0.65 MG/DL — SIGNIFICANT CHANGE UP (ref 0.5–1.3)
EGFR: 126 ML/MIN/1.73M2 — SIGNIFICANT CHANGE UP
EOSINOPHIL # BLD AUTO: 0.14 K/UL — SIGNIFICANT CHANGE UP (ref 0–0.5)
EOSINOPHIL NFR BLD AUTO: 1 % — SIGNIFICANT CHANGE UP (ref 0–6)
GLUCOSE SERPL-MCNC: 95 MG/DL — SIGNIFICANT CHANGE UP (ref 70–99)
HCT VFR BLD CALC: 37 % — SIGNIFICANT CHANGE UP (ref 34.5–45)
HGB BLD-MCNC: 12.6 G/DL — SIGNIFICANT CHANGE UP (ref 11.5–15.5)
IANC: 8.07 K/UL — HIGH (ref 1.8–7.4)
IMM GRANULOCYTES NFR BLD AUTO: 0.9 % — SIGNIFICANT CHANGE UP (ref 0–0.9)
LYMPHOCYTES # BLD AUTO: 19.6 % — SIGNIFICANT CHANGE UP (ref 13–44)
LYMPHOCYTES # BLD AUTO: 2.63 K/UL — SIGNIFICANT CHANGE UP (ref 1–3.3)
MAGNESIUM SERPL-MCNC: 1.9 MG/DL — SIGNIFICANT CHANGE UP (ref 1.6–2.6)
MCHC RBC-ENTMCNC: 29.8 PG — SIGNIFICANT CHANGE UP (ref 27–34)
MCHC RBC-ENTMCNC: 34.1 GM/DL — SIGNIFICANT CHANGE UP (ref 32–36)
MCV RBC AUTO: 87.5 FL — SIGNIFICANT CHANGE UP (ref 80–100)
MONOCYTES # BLD AUTO: 2.4 K/UL — HIGH (ref 0–0.9)
MONOCYTES NFR BLD AUTO: 17.9 % — HIGH (ref 2–14)
NEUTROPHILS # BLD AUTO: 8.07 K/UL — HIGH (ref 1.8–7.4)
NEUTROPHILS NFR BLD AUTO: 60.2 % — SIGNIFICANT CHANGE UP (ref 43–77)
NRBC # BLD: 0 /100 WBCS — SIGNIFICANT CHANGE UP (ref 0–0)
NRBC # FLD: 0 K/UL — SIGNIFICANT CHANGE UP (ref 0–0)
PHOSPHATE SERPL-MCNC: 3.8 MG/DL — SIGNIFICANT CHANGE UP (ref 2.5–4.5)
PLATELET # BLD AUTO: 450 K/UL — HIGH (ref 150–400)
POTASSIUM SERPL-MCNC: 3.7 MMOL/L — SIGNIFICANT CHANGE UP (ref 3.5–5.3)
POTASSIUM SERPL-SCNC: 3.7 MMOL/L — SIGNIFICANT CHANGE UP (ref 3.5–5.3)
PROT SERPL-MCNC: 7.8 G/DL — SIGNIFICANT CHANGE UP (ref 6–8.3)
RBC # BLD: 4.23 M/UL — SIGNIFICANT CHANGE UP (ref 3.8–5.2)
RBC # FLD: 13.6 % — SIGNIFICANT CHANGE UP (ref 10.3–14.5)
RH IG SCN BLD-IMP: POSITIVE — SIGNIFICANT CHANGE UP
SODIUM SERPL-SCNC: 134 MMOL/L — LOW (ref 135–145)
WBC # BLD: 13.41 K/UL — HIGH (ref 3.8–10.5)
WBC # FLD AUTO: 13.41 K/UL — HIGH (ref 3.8–10.5)

## 2023-06-18 PROCEDURE — 99233 SBSQ HOSP IP/OBS HIGH 50: CPT

## 2023-06-18 RX ORDER — ENOXAPARIN SODIUM 100 MG/ML
40 INJECTION SUBCUTANEOUS ONCE
Refills: 0 | Status: COMPLETED | OUTPATIENT
Start: 2023-06-18 | End: 2023-06-18

## 2023-06-18 RX ORDER — SOD SULF/SODIUM/NAHCO3/KCL/PEG
4000 SOLUTION, RECONSTITUTED, ORAL ORAL ONCE
Refills: 0 | Status: COMPLETED | OUTPATIENT
Start: 2023-06-18 | End: 2023-06-18

## 2023-06-18 RX ADMIN — MORPHINE SULFATE 2 MILLIGRAM(S): 50 CAPSULE, EXTENDED RELEASE ORAL at 02:39

## 2023-06-18 RX ADMIN — Medication 1 TABLET(S): at 11:00

## 2023-06-18 RX ADMIN — Medication 650 MILLIGRAM(S): at 18:31

## 2023-06-18 RX ADMIN — Medication 650 MILLIGRAM(S): at 11:28

## 2023-06-18 RX ADMIN — MORPHINE SULFATE 2 MILLIGRAM(S): 50 CAPSULE, EXTENDED RELEASE ORAL at 23:32

## 2023-06-18 RX ADMIN — MORPHINE SULFATE 2 MILLIGRAM(S): 50 CAPSULE, EXTENDED RELEASE ORAL at 07:08

## 2023-06-18 RX ADMIN — MORPHINE SULFATE 2 MILLIGRAM(S): 50 CAPSULE, EXTENDED RELEASE ORAL at 02:59

## 2023-06-18 RX ADMIN — Medication 650 MILLIGRAM(S): at 19:30

## 2023-06-18 RX ADMIN — MORPHINE SULFATE 2 MILLIGRAM(S): 50 CAPSULE, EXTENDED RELEASE ORAL at 06:53

## 2023-06-18 RX ADMIN — MORPHINE SULFATE 2 MILLIGRAM(S): 50 CAPSULE, EXTENDED RELEASE ORAL at 10:56

## 2023-06-18 RX ADMIN — Medication 650 MILLIGRAM(S): at 10:28

## 2023-06-18 RX ADMIN — Medication 30 MILLILITER(S): at 18:30

## 2023-06-18 RX ADMIN — Medication 4000 MILLILITER(S): at 18:08

## 2023-06-18 RX ADMIN — MORPHINE SULFATE 2 MILLIGRAM(S): 50 CAPSULE, EXTENDED RELEASE ORAL at 15:31

## 2023-06-18 RX ADMIN — MORPHINE SULFATE 2 MILLIGRAM(S): 50 CAPSULE, EXTENDED RELEASE ORAL at 15:16

## 2023-06-18 RX ADMIN — MORPHINE SULFATE 2 MILLIGRAM(S): 50 CAPSULE, EXTENDED RELEASE ORAL at 11:11

## 2023-06-18 RX ADMIN — MORPHINE SULFATE 2 MILLIGRAM(S): 50 CAPSULE, EXTENDED RELEASE ORAL at 23:12

## 2023-06-18 RX ADMIN — MORPHINE SULFATE 2 MILLIGRAM(S): 50 CAPSULE, EXTENDED RELEASE ORAL at 20:05

## 2023-06-18 RX ADMIN — MORPHINE SULFATE 2 MILLIGRAM(S): 50 CAPSULE, EXTENDED RELEASE ORAL at 19:45

## 2023-06-18 RX ADMIN — ENOXAPARIN SODIUM 40 MILLIGRAM(S): 100 INJECTION SUBCUTANEOUS at 11:00

## 2023-06-18 NOTE — PROGRESS NOTE ADULT - SUBJECTIVE AND OBJECTIVE BOX
21 yo f presents with RLQ abd pain with non-blood diarrhea for a month.     Subjective:       MEDICATIONS  (STANDING):  enoxaparin Injectable 40 milliGRAM(s) SubCutaneous once  lactobacillus acidophilus 1 Tablet(s) Oral daily  sodium chloride 0.9%. 1000 milliLiter(s) (100 mL/Hr) IV Continuous <Continuous>    MEDICATIONS  (PRN):  acetaminophen     Tablet .. 650 milliGRAM(s) Oral every 6 hours PRN Moderate Pain (4 - 6)  aluminum hydroxide/magnesium hydroxide/simethicone Suspension 30 milliLiter(s) Oral every 4 hours PRN Dyspepsia  morphine  - Injectable 2 milliGRAM(s) IV Push every 4 hours PRN Severe Pain (7 - 10)    VITAL SIGNS:  Vital Signs Last 24 Hrs  T(C): 36.8 (2023 06:33), Max: 37.3 (2023 12:24)  T(F): 98.2 (2023 06:33), Max: 99.2 (2023 12:24)  HR: 97 (2023 06:33) (95 - 99)  BP: 130/66 (2023 06:33) (124/89 - 130/66)  BP(mean): 87 (2023 12:24) (87 - 87)  RR: 16 (2023 06:33) (16 - 17)  SpO2: 100% (2023 06:33) (100% - 100%)    Parameters below as of 2023 06:33  Patient On (Oxygen Delivery Method): room air    PHYSICAL EXAM:  Constitutional: WDWN, mild distress dt pain  Head: NC/AT  Eyes: PERRL, EOMI, anicteric sclera  Neck: supple; no JVD  Respiratory: CTA B/L; no W/R/R  Cardiac: +S1/S2; RRR; no M/R/G  Gastrointestinal: soft, tender to palpation on the RLQ, no rebound tenderness or guarding  Extremities: WWP, no clubbing or cyanosis; no peripheral edema  Musculoskeletal: NROM x4; no joint swelling, tenderness or erythema  Vascular: 2+ radial, DP/PT pulses B/L  Neurologic: AAOx3; CNII-XII grossly intact; no focal deficits    LABS:             LABS:                        12.6   13.41 )-----------( 450      ( 2023 06:37 )             37.0         136  |  100  |  5<L>  ----------------------------<  83  3.6   |  21<L>  |  0.64    Ca    9.2      2023 06:42  Phos  4.0       Mg     1.90         TPro  7.5  /  Alb  3.9  /  TBili  0.4  /  DBili  x   /  AST  24  /  ALT  38<H>  /  AlkPhos  209<H>        Urinalysis Basic - ( 2023 15:09 )    Color: Colorless / Appearance: Clear / S.007 / pH: x  Gluc: x / Ketone: Negative  / Bili: Negative / Urobili: <2 mg/dL   Blood: x / Protein: Negative / Nitrite: Negative   Leuk Esterase: Negative / RBC: x / WBC x   Sq Epi: x / Non Sq Epi: x / Bacteria: x        RADIOLOGY & ADDITIONAL TESTS: Reviewed.     CT ABD/PELVIS:  Pancolitis. Wall thickening of the terminal ileum, which may be reactive   or reflective of terminal ileitis.    Mild heterogeneous enhancement of the right hepatic lobe of uncertain   etiology or clinical significance. Suggest correlation with liver   function tests.   23 yo f presents with RLQ abd pain with non-blood diarrhea for a month.     Subjective: NAEO. this morning, pt reports ongoing abd cramping sensation, mostly at RLQ. denies any fever/chills. persistent loose to watery stools, non-bloody. no nausea/vomiting.   C.diff resulted negative. isolation removed.     MEDICATIONS  (STANDING):  lactobacillus acidophilus 1 Tablet(s) Oral daily  sodium chloride 0.9%. 1000 milliLiter(s) (100 mL/Hr) IV Continuous <Continuous>    MEDICATIONS  (PRN):  acetaminophen     Tablet .. 650 milliGRAM(s) Oral every 6 hours PRN Moderate Pain (4 - 6)  aluminum hydroxide/magnesium hydroxide/simethicone Suspension 30 milliLiter(s) Oral every 4 hours PRN Dyspepsia  morphine  - Injectable 2 milliGRAM(s) IV Push every 4 hours PRN Severe Pain (7 - 10)    VITAL SIGNS:  Vital Signs Last 24 Hrs  T(C): 36.8 (2023 06:33), Max: 37.3 (2023 12:24)  T(F): 98.2 (2023 06:33), Max: 99.2 (2023 12:24)  HR: 97 (2023 06:33) (95 - 99)  BP: 130/66 (2023 06:33) (124/89 - 130/66)  BP(mean): 87 (2023 12:24) (87 - 87)  RR: 16 (2023 06:33) (16 - 17)  SpO2: 100% (2023 06:33) (100% - 100%)    Parameters below as of 2023 06:33  Patient On (Oxygen Delivery Method): room air    PHYSICAL EXAM:  Constitutional: WDWN, mild distress dt pain  Head: NC/AT  Eyes: PERRL, EOMI, anicteric sclera  Neck: supple; no JVD  Respiratory: CTA B/L; no W/R/R  Cardiac: +S1/S2; RRR; no M/R/G  Gastrointestinal: soft, tender to palpation on the RLQ, no rebound tenderness or guarding  Extremities: WWP, no clubbing or cyanosis; no peripheral edema  Musculoskeletal: NROM x4; no joint swelling, tenderness or erythema  Vascular: 2+ radial, DP/PT pulses B/L  Neurologic: AAOx3; CNII-XII grossly intact; no focal deficits    LABS:                        12.6   13.41 )-----------( 450      ( 2023 06:37 )             37.0     06-18    134<L>  |  98  |  4<L>  ----------------------------<  95  3.7   |  20<L>  |  0.65    Ca    9.5      2023 06:37  Phos  3.8     -18  Mg     1.90     -18    TPro  7.8  /  Alb  3.9  /  TBili  0.4  /  DBili  x   /  AST  22  /  ALT  43<H>  /  AlkPhos  232<H>  18      Urinalysis Basic - ( 2023 15:09 )    Color: Colorless / Appearance: Clear / S.007 / pH: x  Gluc: x / Ketone: Negative  / Bili: Negative / Urobili: <2 mg/dL   Blood: x / Protein: Negative / Nitrite: Negative   Leuk Esterase: Negative / RBC: x / WBC x   Sq Epi: x / Non Sq Epi: x / Bacteria: x      RADIOLOGY & ADDITIONAL TESTS: Reviewed.     CT ABD/PELVIS:  Pancolitis. Wall thickening of the terminal ileum, which may be reactive   or reflective of terminal ileitis.    Mild heterogeneous enhancement of the right hepatic lobe of uncertain   etiology or clinical significance. Suggest correlation with liver   function tests.

## 2023-06-18 NOTE — PROGRESS NOTE ADULT - NSPROGADDITIONALINFOA_GEN_ALL_CORE
diet: clear liquid, npo midnight  dvt: last dose of lovenox, hold for tomorrow procedure   dispo: pending work up

## 2023-06-18 NOTE — PROGRESS NOTE ADULT - PROBLEM SELECTOR PLAN 1
pw RLQ abdominal pain with diarrhea for a month. CT abd/pelvis with Pancolitis. Wall thickening of the terminal ileum, which may be reactive or reflective of terminal ileitis.  afebrile, elev WBC. HDS.   concern for possible IBD although infectious etiology cannot be ruled out  - GI PCR negative, c.diff negative.  fecal calprotein  - CRP elevated, HEP panel pending   - GI consulted, appreciate recs  - per GI plan for colonoscopy on Monday, npo sunday midnight   - cont with pain control with Morphine 2mg q4hr   - cont with Clear liquid and npo tonight pw RLQ abdominal pain with diarrhea for a month. CT abd/pelvis with Pancolitis. Wall thickening of the terminal ileum, which may be reactive or reflective of terminal ileitis.  afebrile, elev WBC. HDS.   concern for possible IBD although infectious etiology cannot be ruled out  - GI PCR negative, c.diff negative.  fecal calprotein pending   - CRP elevated, hep B negative   - GI consulted, appreciate recs  - per GI plan for colonoscopy on Monday, npo sunday midnight   - cont with pain control with Morphine 2mg q4hr prn  - cont with Clear liquid and npo tonight

## 2023-06-18 NOTE — PROVIDER CONTACT NOTE (OTHER) - ASSESSMENT
Pt. c/o severe abdominal pain and not due for morphine PRN. Pt. c/o 10/10 and chills. Temp 99.5F oral.

## 2023-06-18 NOTE — CHART NOTE - NSCHARTNOTEFT_GEN_A_CORE
Patient will proceed w/ colonoscopy tomorrow. Stool studies have been neg.     Recommendations:   - Ordered GoLytely prep  - CLD for now.   - NPO after midnight.   - Colonoscopy tomorrow.   - Please obtain early 3 AM labs prior to the procedure tomorrow.     GI will continue to follow.     Lazaro Domínguez, PGY-4  Gastroenterology/Hepatology Fellow  Available on [employer] Teams  [phone] (Short Range Pager)  [phone] (Long Range Pager)    After 5pm, please contact the on-call GI fellow. [phone]

## 2023-06-18 NOTE — PROGRESS NOTE ADULT - TIME BILLING
Time-based billing (NON-critical care).     [ 55 ] minutes spent on total encounter; more than 50% of the visit was spent counseling and / or coordinating care by the attending physician.  The necessity of the time spent during the encounter on this date of service was due to:     documentation in [place], reviewing chart and coordinating care with patient/ACP and interdisciplinary staff (such as , social workers, etc) as well as reviewing vitals, laboratory data, radiology, medication list, consultants' recommendations and prior records. Interventions were performed as documented above.
Time-based billing (NON-critical care).     [ 55 ] minutes spent on total encounter; more than 50% of the visit was spent counseling and / or coordinating care by the attending physician.  The necessity of the time spent during the encounter on this date of service was due to:     documentation in [place], reviewing chart and coordinating care with patient/ACP and interdisciplinary staff (such as , social workers, etc) as well as reviewing vitals, laboratory data, radiology, medication list, consultants' recommendations and prior records. Interventions were performed as documented above.
Time-based billing (NON-critical care).     [ 55 ] minutes spent on total encounter; more than 50% of the visit was spent counseling and / or coordinating care by the attending physician.  The necessity of the time spent during the encounter on this date of service was due to:     documentation in [place], reviewing chart and coordinating care with patient/ACP and interdisciplinary staff (such as , social workers, etc) as well as reviewing vitals, laboratory data, radiology, medication list, consultants' recommendations and prior records. Interventions were performed as documented above.

## 2023-06-19 LAB
ALBUMIN SERPL ELPH-MCNC: 3.9 G/DL — SIGNIFICANT CHANGE UP (ref 3.3–5)
ALP SERPL-CCNC: 264 U/L — HIGH (ref 40–120)
ALT FLD-CCNC: 65 U/L — HIGH (ref 4–33)
ANION GAP SERPL CALC-SCNC: 21 MMOL/L — HIGH (ref 7–14)
ANISOCYTOSIS BLD QL: SLIGHT — SIGNIFICANT CHANGE UP
APPEARANCE UR: ABNORMAL
APTT BLD: 28.2 SEC — SIGNIFICANT CHANGE UP (ref 27–36.3)
AST SERPL-CCNC: 48 U/L — HIGH (ref 4–32)
B PERT DNA SPEC QL NAA+PROBE: SIGNIFICANT CHANGE UP
B PERT+PARAPERT DNA PNL SPEC NAA+PROBE: SIGNIFICANT CHANGE UP
BACTERIA # UR AUTO: ABNORMAL
BASE EXCESS BLDV CALC-SCNC: 0.3 MMOL/L — SIGNIFICANT CHANGE UP (ref -2–3)
BASOPHILS # BLD AUTO: 0 K/UL — SIGNIFICANT CHANGE UP (ref 0–0.2)
BASOPHILS NFR BLD AUTO: 0 % — SIGNIFICANT CHANGE UP (ref 0–2)
BILIRUB SERPL-MCNC: 0.5 MG/DL — SIGNIFICANT CHANGE UP (ref 0.2–1.2)
BILIRUB UR-MCNC: NEGATIVE — SIGNIFICANT CHANGE UP
BLOOD GAS VENOUS COMPREHENSIVE RESULT: SIGNIFICANT CHANGE UP
BORDETELLA PARAPERTUSSIS (RAPRVP): SIGNIFICANT CHANGE UP
BUN SERPL-MCNC: 4 MG/DL — LOW (ref 7–23)
C PNEUM DNA SPEC QL NAA+PROBE: SIGNIFICANT CHANGE UP
CALCIUM SERPL-MCNC: 9.5 MG/DL — SIGNIFICANT CHANGE UP (ref 8.4–10.5)
CHLORIDE BLDV-SCNC: 98 MMOL/L — SIGNIFICANT CHANGE UP (ref 96–108)
CHLORIDE SERPL-SCNC: 98 MMOL/L — SIGNIFICANT CHANGE UP (ref 98–107)
CO2 BLDV-SCNC: 25.8 MMOL/L — SIGNIFICANT CHANGE UP (ref 22–26)
CO2 SERPL-SCNC: 18 MMOL/L — LOW (ref 22–31)
COLOR SPEC: YELLOW — SIGNIFICANT CHANGE UP
CREAT SERPL-MCNC: 0.7 MG/DL — SIGNIFICANT CHANGE UP (ref 0.5–1.3)
DIFF PNL FLD: NEGATIVE — SIGNIFICANT CHANGE UP
EGFR: 124 ML/MIN/1.73M2 — SIGNIFICANT CHANGE UP
EOSINOPHIL # BLD AUTO: 0.24 K/UL — SIGNIFICANT CHANGE UP (ref 0–0.5)
EOSINOPHIL NFR BLD AUTO: 1.7 % — SIGNIFICANT CHANGE UP (ref 0–6)
EPI CELLS # UR: 12 /HPF — HIGH (ref 0–5)
FLUAV SUBTYP SPEC NAA+PROBE: SIGNIFICANT CHANGE UP
FLUBV RNA SPEC QL NAA+PROBE: SIGNIFICANT CHANGE UP
GAS PNL BLDV: 131 MMOL/L — LOW (ref 136–145)
GAS PNL BLDV: SIGNIFICANT CHANGE UP
GIANT PLATELETS BLD QL SMEAR: PRESENT — SIGNIFICANT CHANGE UP
GLUCOSE BLDV-MCNC: 125 MG/DL — HIGH (ref 70–99)
GLUCOSE SERPL-MCNC: 125 MG/DL — HIGH (ref 70–99)
GLUCOSE UR QL: NEGATIVE — SIGNIFICANT CHANGE UP
HADV DNA SPEC QL NAA+PROBE: SIGNIFICANT CHANGE UP
HCO3 BLDV-SCNC: 25 MMOL/L — SIGNIFICANT CHANGE UP (ref 22–29)
HCOV 229E RNA SPEC QL NAA+PROBE: SIGNIFICANT CHANGE UP
HCOV HKU1 RNA SPEC QL NAA+PROBE: SIGNIFICANT CHANGE UP
HCOV NL63 RNA SPEC QL NAA+PROBE: SIGNIFICANT CHANGE UP
HCOV OC43 RNA SPEC QL NAA+PROBE: SIGNIFICANT CHANGE UP
HCT VFR BLD CALC: 36.4 % — SIGNIFICANT CHANGE UP (ref 34.5–45)
HCT VFR BLDA CALC: 37 % — SIGNIFICANT CHANGE UP (ref 34.5–46.5)
HGB BLD CALC-MCNC: 12.2 G/DL — SIGNIFICANT CHANGE UP (ref 11.7–16.1)
HGB BLD-MCNC: 12.1 G/DL — SIGNIFICANT CHANGE UP (ref 11.5–15.5)
HMPV RNA SPEC QL NAA+PROBE: SIGNIFICANT CHANGE UP
HPIV1 RNA SPEC QL NAA+PROBE: SIGNIFICANT CHANGE UP
HPIV2 RNA SPEC QL NAA+PROBE: SIGNIFICANT CHANGE UP
HPIV3 RNA SPEC QL NAA+PROBE: SIGNIFICANT CHANGE UP
HPIV4 RNA SPEC QL NAA+PROBE: SIGNIFICANT CHANGE UP
HYALINE CASTS # UR AUTO: 5 /LPF — SIGNIFICANT CHANGE UP (ref 0–7)
IANC: 8.91 K/UL — HIGH (ref 1.8–7.4)
INR BLD: 1.55 RATIO — HIGH (ref 0.88–1.16)
KETONES UR-MCNC: ABNORMAL
LACTATE BLDV-MCNC: 1.4 MMOL/L — SIGNIFICANT CHANGE UP (ref 0.5–2)
LEUKOCYTE ESTERASE UR-ACNC: NEGATIVE — SIGNIFICANT CHANGE UP
LYMPHOCYTES # BLD AUTO: 18.3 % — SIGNIFICANT CHANGE UP (ref 13–44)
LYMPHOCYTES # BLD AUTO: 2.54 K/UL — SIGNIFICANT CHANGE UP (ref 1–3.3)
M PNEUMO DNA SPEC QL NAA+PROBE: SIGNIFICANT CHANGE UP
MACROCYTES BLD QL: SLIGHT — SIGNIFICANT CHANGE UP
MAGNESIUM SERPL-MCNC: 1.9 MG/DL — SIGNIFICANT CHANGE UP (ref 1.6–2.6)
MANUAL SMEAR VERIFICATION: SIGNIFICANT CHANGE UP
MCHC RBC-ENTMCNC: 28.9 PG — SIGNIFICANT CHANGE UP (ref 27–34)
MCHC RBC-ENTMCNC: 33.2 GM/DL — SIGNIFICANT CHANGE UP (ref 32–36)
MCV RBC AUTO: 86.9 FL — SIGNIFICANT CHANGE UP (ref 80–100)
METAMYELOCYTES # FLD: 0.9 % — SIGNIFICANT CHANGE UP (ref 0–1)
MONOCYTES # BLD AUTO: 2.29 K/UL — HIGH (ref 0–0.9)
MONOCYTES NFR BLD AUTO: 16.5 % — HIGH (ref 2–14)
NEUTROPHILS # BLD AUTO: 8.7 K/UL — HIGH (ref 1.8–7.4)
NEUTROPHILS NFR BLD AUTO: 57.4 % — SIGNIFICANT CHANGE UP (ref 43–77)
NEUTS BAND # BLD: 5.2 % — SIGNIFICANT CHANGE UP (ref 0–6)
NITRITE UR-MCNC: NEGATIVE — SIGNIFICANT CHANGE UP
PCO2 BLDV: 38 MMHG — LOW (ref 39–52)
PH BLDV: 7.42 — SIGNIFICANT CHANGE UP (ref 7.32–7.43)
PH UR: 6 — SIGNIFICANT CHANGE UP (ref 5–8)
PHOSPHATE SERPL-MCNC: 3.8 MG/DL — SIGNIFICANT CHANGE UP (ref 2.5–4.5)
PLAT MORPH BLD: NORMAL — SIGNIFICANT CHANGE UP
PLATELET # BLD AUTO: 442 K/UL — HIGH (ref 150–400)
PLATELET COUNT - ESTIMATE: NORMAL — SIGNIFICANT CHANGE UP
PO2 BLDV: 72 MMHG — HIGH (ref 25–45)
POIKILOCYTOSIS BLD QL AUTO: SLIGHT — SIGNIFICANT CHANGE UP
POLYCHROMASIA BLD QL SMEAR: SLIGHT — SIGNIFICANT CHANGE UP
POTASSIUM BLDV-SCNC: 3 MMOL/L — LOW (ref 3.5–5.1)
POTASSIUM SERPL-MCNC: 3.2 MMOL/L — LOW (ref 3.5–5.3)
POTASSIUM SERPL-SCNC: 3.2 MMOL/L — LOW (ref 3.5–5.3)
PROT SERPL-MCNC: 8 G/DL — SIGNIFICANT CHANGE UP (ref 6–8.3)
PROT UR-MCNC: ABNORMAL
PROTHROM AB SERPL-ACNC: 18.1 SEC — HIGH (ref 10.5–13.4)
RAPID RVP RESULT: SIGNIFICANT CHANGE UP
RBC # BLD: 4.19 M/UL — SIGNIFICANT CHANGE UP (ref 3.8–5.2)
RBC # FLD: 13.7 % — SIGNIFICANT CHANGE UP (ref 10.3–14.5)
RBC BLD AUTO: NORMAL — SIGNIFICANT CHANGE UP
RBC CASTS # UR COMP ASSIST: 1 /HPF — SIGNIFICANT CHANGE UP (ref 0–4)
RSV RNA SPEC QL NAA+PROBE: SIGNIFICANT CHANGE UP
RV+EV RNA SPEC QL NAA+PROBE: SIGNIFICANT CHANGE UP
SAO2 % BLDV: 94.8 % — HIGH (ref 67–88)
SARS-COV-2 RNA SPEC QL NAA+PROBE: SIGNIFICANT CHANGE UP
SODIUM SERPL-SCNC: 137 MMOL/L — SIGNIFICANT CHANGE UP (ref 135–145)
SP GR SPEC: 1.02 — SIGNIFICANT CHANGE UP (ref 1.01–1.05)
UROBILINOGEN FLD QL: SIGNIFICANT CHANGE UP
WBC # BLD: 13.89 K/UL — HIGH (ref 3.8–10.5)
WBC # FLD AUTO: 13.89 K/UL — HIGH (ref 3.8–10.5)
WBC UR QL: 6 /HPF — HIGH (ref 0–5)

## 2023-06-19 PROCEDURE — 45380 COLONOSCOPY AND BIOPSY: CPT | Mod: GC

## 2023-06-19 PROCEDURE — 88342 IMHCHEM/IMCYTCHM 1ST ANTB: CPT | Mod: 26

## 2023-06-19 PROCEDURE — 88312 SPECIAL STAINS GROUP 1: CPT | Mod: 26

## 2023-06-19 PROCEDURE — 88305 TISSUE EXAM BY PATHOLOGIST: CPT | Mod: 26

## 2023-06-19 PROCEDURE — 99233 SBSQ HOSP IP/OBS HIGH 50: CPT

## 2023-06-19 PROCEDURE — 74018 RADEX ABDOMEN 1 VIEW: CPT | Mod: 26

## 2023-06-19 PROCEDURE — 71045 X-RAY EXAM CHEST 1 VIEW: CPT | Mod: 26

## 2023-06-19 RX ORDER — ACETAMINOPHEN 500 MG
1000 TABLET ORAL ONCE
Refills: 0 | Status: COMPLETED | OUTPATIENT
Start: 2023-06-19 | End: 2023-06-19

## 2023-06-19 RX ORDER — MORPHINE SULFATE 50 MG/1
1 CAPSULE, EXTENDED RELEASE ORAL ONCE
Refills: 0 | Status: DISCONTINUED | OUTPATIENT
Start: 2023-06-19 | End: 2023-06-19

## 2023-06-19 RX ORDER — POTASSIUM CHLORIDE 20 MEQ
20 PACKET (EA) ORAL ONCE
Refills: 0 | Status: COMPLETED | OUTPATIENT
Start: 2023-06-19 | End: 2023-06-19

## 2023-06-19 RX ORDER — POTASSIUM CHLORIDE 20 MEQ
10 PACKET (EA) ORAL
Refills: 0 | Status: COMPLETED | OUTPATIENT
Start: 2023-06-19 | End: 2023-06-19

## 2023-06-19 RX ORDER — ACETAMINOPHEN 500 MG
650 TABLET ORAL ONCE
Refills: 0 | Status: COMPLETED | OUTPATIENT
Start: 2023-06-19 | End: 2023-06-19

## 2023-06-19 RX ADMIN — MORPHINE SULFATE 2 MILLIGRAM(S): 50 CAPSULE, EXTENDED RELEASE ORAL at 22:44

## 2023-06-19 RX ADMIN — MORPHINE SULFATE 1 MILLIGRAM(S): 50 CAPSULE, EXTENDED RELEASE ORAL at 01:57

## 2023-06-19 RX ADMIN — Medication 400 MILLIGRAM(S): at 05:47

## 2023-06-19 RX ADMIN — Medication 1 TABLET(S): at 11:44

## 2023-06-19 RX ADMIN — MORPHINE SULFATE 1 MILLIGRAM(S): 50 CAPSULE, EXTENDED RELEASE ORAL at 01:37

## 2023-06-19 RX ADMIN — Medication 650 MILLIGRAM(S): at 22:44

## 2023-06-19 RX ADMIN — MORPHINE SULFATE 2 MILLIGRAM(S): 50 CAPSULE, EXTENDED RELEASE ORAL at 08:25

## 2023-06-19 RX ADMIN — Medication 20 MILLIEQUIVALENT(S): at 17:35

## 2023-06-19 RX ADMIN — Medication 1000 MILLIGRAM(S): at 06:07

## 2023-06-19 RX ADMIN — Medication 100 MILLIEQUIVALENT(S): at 11:43

## 2023-06-19 RX ADMIN — MORPHINE SULFATE 2 MILLIGRAM(S): 50 CAPSULE, EXTENDED RELEASE ORAL at 23:15

## 2023-06-19 NOTE — PROVIDER CONTACT NOTE (OTHER) - SITUATION
Pt. febrile 100.5F rectal
Patient c/o of new onset right sided flank pain
Pt. c/o severe abdominal pain and not due for morphine PRN
Pt stated that she started to feel chills.

## 2023-06-19 NOTE — PROVIDER CONTACT NOTE (OTHER) - REASON
Pt. febrile 100.5F rectal
Pt. c/o severe abdominal pain and not due for morphine PRN
Pt stated that she started to feel chills.
Patient c/o of new onset right sided flank pain

## 2023-06-19 NOTE — PROGRESS NOTE ADULT - PROBLEM SELECTOR PLAN 1
pw RLQ abdominal pain with diarrhea for a month. CT abd/pelvis with Pancolitis. Wall thickening of the terminal ileum, which may be reactive or reflective of terminal ileitis.  afebrile, elev WBC. HDS.   concern for possible IBD although infectious etiology cannot be ruled out  - GI PCR negative, c.diff negative.  fecal calprotein pending   - CRP elevated, hep B negative   - GI consulted, for colonoscopy on today  - cont with pain control with Morphine 2mg q4hr prn  -fever overnight may be secondary to IBD, Cxr wnl, UA clear; fu cx's

## 2023-06-19 NOTE — CHART NOTE - NSCHARTNOTEFT_GEN_A_CORE
Notified by RN pt w temp of 100.5. Pt endorsing increased abdominal pain and chills. Pain is cramping pain throughout the entire abdomen. Pt is scheduled for colonoscopy to r/o Crohn's dz, took colonoscopy prep OVN. Denies CP, SOB, HA, dysuria or N/V. Abdomen mildly tender, soft, non-distended.     Fever  - f/u CXR, UA, CBC, BMP, VBG, and BCxx2  - cont morphine for pain, tylenol for fever  - f/u with GI regarding colonoscopy

## 2023-06-19 NOTE — CHART NOTE - NSCHARTNOTEFT_GEN_A_CORE
Provider was notified by RN that patient had a Temp of 100.8 ( recurrent fever),  BCx, UA and CXray were previously ordered earlier. S/p colonoscopy today for Crohn's disease, on Morphine for abdominal pain.  Will continue to monitor.

## 2023-06-19 NOTE — PROVIDER CONTACT NOTE (OTHER) - BACKGROUND
Admitted for abdominal pain
Pt stated that she started to feel chills.
Patient admitted for ulcerative colitis
Admitted for abdominal pain

## 2023-06-19 NOTE — PROVIDER CONTACT NOTE (OTHER) - ASSESSMENT
Pt. febrile 100.5F rectal. A&Ox4, pt. c/o severe abdominal pain not adequately managed with current morphine order. No c/o pain when urinating and no respiratory issues at this time. Pt. febrile 100.5F rectal. A&Ox4, pt. c/o severe abdominal pain not adequately managed with current morphine order. No c/o pain when urinating and no respiratory issues at this time. Pt. c/o intermittent chills

## 2023-06-19 NOTE — PROGRESS NOTE ADULT - SUBJECTIVE AND OBJECTIVE BOX
PROGRESS NOTE:     Patient is a 22y old  Female who presents with a chief complaint of rlq pain (2023 09:38)      SUBJECTIVE / OVERNIGHT EVENTS: very tired this am, also with abdominal pain     ADDITIONAL REVIEW OF SYSTEMS:    MEDICATIONS  (STANDING):  lactobacillus acidophilus 1 Tablet(s) Oral daily  potassium chloride  10 mEq/100 mL IVPB 10 milliEquivalent(s) IV Intermittent every 1 hour  sodium chloride 0.9%. 1000 milliLiter(s) (100 mL/Hr) IV Continuous <Continuous>    MEDICATIONS  (PRN):  acetaminophen     Tablet .. 650 milliGRAM(s) Oral every 6 hours PRN Moderate Pain (4 - 6)  aluminum hydroxide/magnesium hydroxide/simethicone Suspension 30 milliLiter(s) Oral every 4 hours PRN Dyspepsia  morphine  - Injectable 2 milliGRAM(s) IV Push every 4 hours PRN Severe Pain (7 - 10)      CAPILLARY BLOOD GLUCOSE        I&O's Summary      PHYSICAL EXAM:  Vital Signs Last 24 Hrs  T(C): 36.5 (2023 08:25), Max: 38.1 (2023 05:26)  T(F): 97.7 (2023 08:25), Max: 100.5 (2023 05:26)  HR: 89 (2023 08:25) (89 - 116)  BP: 116/65 (2023 08:25) (116/65 - 130/85)  BP(mean): --  RR: 18 (2023 08:25) (16 - 18)  SpO2: 100% (2023 08:25) (98% - 100%)    Parameters below as of 2023 08:25  Patient On (Oxygen Delivery Method): room air        CONSTITUTIONAL: tired, pale  RESPIRATORY: Normal respiratory effort; lungs are clear to auscultation bilaterally  CARDIOVASCULAR: Regular rate and rhythm, normal S1 and S2, no murmur/rub/gallop; No lower extremity edema; Peripheral pulses are 2+ bilaterally  ABDOMEN:tender to palpation in RLQ and LLQ, suprapubic region  MUSCLOSKELETAL: no clubbing or cyanosis of digits; no joint swelling or tenderness to palpation  PSYCH: A+O to person, place, and time; affect appropriate    LABS:                        12.1   13.89 )-----------( 442      ( 2023 06:54 )             36.4     06-18    134<L>  |  98  |  4<L>  ----------------------------<  95  3.7   |  20<L>  |  0.65    Ca    9.5      2023 06:37  Phos  3.8     06-18  Mg     1.90     18    TPro  7.8  /  Alb  3.9  /  TBili  0.4  /  DBili  x   /  AST  22  /  ALT  43<H>  /  AlkPhos  232<H>  -18    PT/INR - ( 2023 06:54 )   PT: 18.1 sec;   INR: 1.55 ratio         PTT - ( 2023 06:54 )  PTT:28.2 sec      Urinalysis Basic - ( 2023 10:45 )    Color: Yellow / Appearance: Slightly Turbid / S.022 / pH: x  Gluc: x / Ketone: Small  / Bili: Negative / Urobili: <2 mg/dL   Blood: x / Protein: 30 mg/dL / Nitrite: Negative   Leuk Esterase: Negative / RBC: 1 /HPF / WBC 6 /HPF   Sq Epi: x / Non Sq Epi: x / Bacteria: Few          RADIOLOGY & ADDITIONAL TESTS:  Results Reviewed:   Imaging Personally Reviewed:  Electrocardiogram Personally Reviewed:    COORDINATION OF CARE:  Care Discussed with Consultants/Other Providers [Y/N]:  Prior or Outpatient Records Reviewed [Y/N]:

## 2023-06-20 LAB
ANION GAP SERPL CALC-SCNC: 13 MMOL/L — SIGNIFICANT CHANGE UP (ref 7–14)
BUN SERPL-MCNC: 6 MG/DL — LOW (ref 7–23)
CALCIUM SERPL-MCNC: 9.3 MG/DL — SIGNIFICANT CHANGE UP (ref 8.4–10.5)
CHLORIDE SERPL-SCNC: 97 MMOL/L — LOW (ref 98–107)
CO2 SERPL-SCNC: 23 MMOL/L — SIGNIFICANT CHANGE UP (ref 22–31)
CREAT SERPL-MCNC: 0.63 MG/DL — SIGNIFICANT CHANGE UP (ref 0.5–1.3)
EGFR: 127 ML/MIN/1.73M2 — SIGNIFICANT CHANGE UP
GAMMA INTERFERON BACKGROUND BLD IA-ACNC: 0.03 IU/ML — SIGNIFICANT CHANGE UP
GLUCOSE SERPL-MCNC: 104 MG/DL — HIGH (ref 70–99)
HCT VFR BLD CALC: 33.2 % — LOW (ref 34.5–45)
HGB BLD-MCNC: 11 G/DL — LOW (ref 11.5–15.5)
M TB IFN-G BLD-IMP: NEGATIVE — SIGNIFICANT CHANGE UP
M TB IFN-G CD4+ BCKGRND COR BLD-ACNC: 0 IU/ML — SIGNIFICANT CHANGE UP
M TB IFN-G CD4+CD8+ BCKGRND COR BLD-ACNC: 0 IU/ML — SIGNIFICANT CHANGE UP
MAGNESIUM SERPL-MCNC: 1.8 MG/DL — SIGNIFICANT CHANGE UP (ref 1.6–2.6)
MCHC RBC-ENTMCNC: 28.5 PG — SIGNIFICANT CHANGE UP (ref 27–34)
MCHC RBC-ENTMCNC: 33.1 GM/DL — SIGNIFICANT CHANGE UP (ref 32–36)
MCV RBC AUTO: 86 FL — SIGNIFICANT CHANGE UP (ref 80–100)
NRBC # BLD: 0 /100 WBCS — SIGNIFICANT CHANGE UP (ref 0–0)
NRBC # FLD: 0 K/UL — SIGNIFICANT CHANGE UP (ref 0–0)
PHOSPHATE SERPL-MCNC: 3.1 MG/DL — SIGNIFICANT CHANGE UP (ref 2.5–4.5)
PLATELET # BLD AUTO: 425 K/UL — HIGH (ref 150–400)
POTASSIUM SERPL-MCNC: 3.4 MMOL/L — LOW (ref 3.5–5.3)
POTASSIUM SERPL-SCNC: 3.4 MMOL/L — LOW (ref 3.5–5.3)
QUANT TB PLUS MITOGEN MINUS NIL: >10 IU/ML — SIGNIFICANT CHANGE UP
RBC # BLD: 3.86 M/UL — SIGNIFICANT CHANGE UP (ref 3.8–5.2)
RBC # FLD: 13.7 % — SIGNIFICANT CHANGE UP (ref 10.3–14.5)
SODIUM SERPL-SCNC: 133 MMOL/L — LOW (ref 135–145)
WBC # BLD: 12.24 K/UL — HIGH (ref 3.8–10.5)
WBC # FLD AUTO: 12.24 K/UL — HIGH (ref 3.8–10.5)

## 2023-06-20 PROCEDURE — 99233 SBSQ HOSP IP/OBS HIGH 50: CPT

## 2023-06-20 PROCEDURE — 99232 SBSQ HOSP IP/OBS MODERATE 35: CPT | Mod: GC

## 2023-06-20 RX ORDER — ACETAMINOPHEN 500 MG
1000 TABLET ORAL ONCE
Refills: 0 | Status: COMPLETED | OUTPATIENT
Start: 2023-06-20 | End: 2023-06-20

## 2023-06-20 RX ORDER — MORPHINE SULFATE 50 MG/1
3 CAPSULE, EXTENDED RELEASE ORAL EVERY 4 HOURS
Refills: 0 | Status: DISCONTINUED | OUTPATIENT
Start: 2023-06-20 | End: 2023-06-22

## 2023-06-20 RX ORDER — POTASSIUM CHLORIDE 20 MEQ
40 PACKET (EA) ORAL ONCE
Refills: 0 | Status: DISCONTINUED | OUTPATIENT
Start: 2023-06-20 | End: 2023-06-20

## 2023-06-20 RX ORDER — POTASSIUM CHLORIDE 20 MEQ
40 PACKET (EA) ORAL ONCE
Refills: 0 | Status: COMPLETED | OUTPATIENT
Start: 2023-06-20 | End: 2023-06-20

## 2023-06-20 RX ORDER — KETOROLAC TROMETHAMINE 30 MG/ML
15 SYRINGE (ML) INJECTION ONCE
Refills: 0 | Status: DISCONTINUED | OUTPATIENT
Start: 2023-06-20 | End: 2023-06-20

## 2023-06-20 RX ORDER — PANTOPRAZOLE SODIUM 20 MG/1
40 TABLET, DELAYED RELEASE ORAL
Refills: 0 | Status: DISCONTINUED | OUTPATIENT
Start: 2023-06-20 | End: 2023-06-22

## 2023-06-20 RX ADMIN — Medication 15 MILLIGRAM(S): at 11:32

## 2023-06-20 RX ADMIN — Medication 20 MILLIGRAM(S): at 05:54

## 2023-06-20 RX ADMIN — Medication 650 MILLIGRAM(S): at 00:22

## 2023-06-20 RX ADMIN — MORPHINE SULFATE 3 MILLIGRAM(S): 50 CAPSULE, EXTENDED RELEASE ORAL at 23:59

## 2023-06-20 RX ADMIN — PANTOPRAZOLE SODIUM 40 MILLIGRAM(S): 20 TABLET, DELAYED RELEASE ORAL at 11:33

## 2023-06-20 RX ADMIN — MORPHINE SULFATE 3 MILLIGRAM(S): 50 CAPSULE, EXTENDED RELEASE ORAL at 15:55

## 2023-06-20 RX ADMIN — Medication 20 MILLIGRAM(S): at 14:38

## 2023-06-20 RX ADMIN — Medication 1 TABLET(S): at 11:32

## 2023-06-20 RX ADMIN — MORPHINE SULFATE 3 MILLIGRAM(S): 50 CAPSULE, EXTENDED RELEASE ORAL at 19:39

## 2023-06-20 RX ADMIN — Medication 20 MILLIGRAM(S): at 21:44

## 2023-06-20 RX ADMIN — Medication 400 MILLIGRAM(S): at 05:58

## 2023-06-20 RX ADMIN — MORPHINE SULFATE 3 MILLIGRAM(S): 50 CAPSULE, EXTENDED RELEASE ORAL at 15:27

## 2023-06-20 RX ADMIN — Medication 40 MILLIEQUIVALENT(S): at 14:35

## 2023-06-20 RX ADMIN — MORPHINE SULFATE 3 MILLIGRAM(S): 50 CAPSULE, EXTENDED RELEASE ORAL at 20:15

## 2023-06-20 RX ADMIN — MORPHINE SULFATE 2 MILLIGRAM(S): 50 CAPSULE, EXTENDED RELEASE ORAL at 04:50

## 2023-06-20 RX ADMIN — Medication 15 MILLIGRAM(S): at 12:00

## 2023-06-20 RX ADMIN — MORPHINE SULFATE 2 MILLIGRAM(S): 50 CAPSULE, EXTENDED RELEASE ORAL at 05:30

## 2023-06-20 RX ADMIN — Medication 1000 MILLIGRAM(S): at 06:30

## 2023-06-20 NOTE — PROGRESS NOTE ADULT - SUBJECTIVE AND OBJECTIVE BOX
ANESTHESIA POSTOP CHECK    22y Female POSTOP DAY 1 S/P     Vital Signs Last 24 Hrs  T(C): 37.1 (20 Jun 2023 06:30), Max: 38.2 (19 Jun 2023 22:09)  T(F): 98.7 (20 Jun 2023 06:30), Max: 100.8 (19 Jun 2023 22:09)  HR: 105 (20 Jun 2023 04:40) (82 - 107)  BP: 119/71 (20 Jun 2023 04:40) (90/52 - 123/71)  BP(mean): --  RR: 18 (20 Jun 2023 04:40) (16 - 19)  SpO2: 96% (20 Jun 2023 04:40) (96% - 100%)    Parameters below as of 20 Jun 2023 04:40  Patient On (Oxygen Delivery Method): room air      I&O's Summary      [ x] NO APPARENT ANESTHESIA COMPLICATIONS

## 2023-06-20 NOTE — PROGRESS NOTE ADULT - SUBJECTIVE AND OBJECTIVE BOX
Gastroenterology/Hepatology Progress Note    Interval Events: Patient febrile overnight. Today, patient endorses ongoing abdominal pain. Had episode of diarrhea this morning.     Allergies:  No Known Allergies    Hospital Medications:  acetaminophen     Tablet .. 650 milliGRAM(s) Oral every 6 hours PRN  aluminum hydroxide/magnesium hydroxide/simethicone Suspension 30 milliLiter(s) Oral every 4 hours PRN  lactobacillus acidophilus 1 Tablet(s) Oral daily  methylPREDNISolone sodium succinate Injectable 20 milliGRAM(s) IV Push every 8 hours  morphine  - Injectable 2 milliGRAM(s) IV Push every 4 hours PRN  pantoprazole    Tablet 40 milliGRAM(s) Oral before breakfast  sodium chloride 0.9%. 1000 milliLiter(s) IV Continuous <Continuous>    ROS: 14 point ROS negative unless otherwise state in subjective    PHYSICAL EXAM:   Vital Signs:  Vital Signs Last 24 Hrs  T(C): 37.1 (2023 06:30), Max: 38.2 (2023 22:09)  T(F): 98.7 (2023 06:30), Max: 100.8 (2023 22:09)  HR: 105 (2023 04:40) (82 - 107)  BP: 119/71 (2023 04:40) (90/52 - 123/71)  BP(mean): --  RR: 18 (2023 04:40) (16 - 19)  SpO2: 96% (2023 04:40) (96% - 100%)    Parameters below as of 2023 04:40  Patient On (Oxygen Delivery Method): room air      Daily     Daily     GENERAL:  No acute distress  HEENT:  NCAT, no scleral icterus  CHEST: no resp distress  HEART:  RRR  ABDOMEN:  Soft, TTP in lower abdominal quadrants  EXTREMITIES:  No cyanosis, clubbing, or edema  SKIN:  No rash/erythema/ecchymoses/petechiae/wounds/abscess/warm/dry  NEURO:  Alert and oriented x 3, no asterixis, no tremor    LABS:                        11.0   12.24 )-----------( 425      ( 2023 06:36 )             33.2     Mean Cell Volume: 86.0 fL (06-20-23 @ 06:36)    06-20    133<L>  |  97<L>  |  6<L>  ----------------------------<  104<H>  3.4<L>   |  23  |  0.63    Ca    9.3      2023 06:36  Phos  3.1     -  Mg     1.80         TPro  8.0  /  Alb  3.9  /  TBili  0.5  /  DBili  x   /  AST  48<H>  /  ALT  65<H>  /  AlkPhos  264<H>      LIVER FUNCTIONS - ( 2023 06:54 )  Alb: 3.9 g/dL / Pro: 8.0 g/dL / ALK PHOS: 264 U/L / ALT: 65 U/L / AST: 48 U/L / GGT: x           PT/INR - ( 2023 06:54 )   PT: 18.1 sec;   INR: 1.55 ratio         PTT - ( 2023 06:54 )  PTT:28.2 sec  Urinalysis Basic - ( 2023 10:45 )    Color: Yellow / Appearance: Slightly Turbid / S.022 / pH: x  Gluc: x / Ketone: Small  / Bili: Negative / Urobili: <2 mg/dL   Blood: x / Protein: 30 mg/dL / Nitrite: Negative   Leuk Esterase: Negative / RBC: 1 /HPF / WBC 6 /HPF   Sq Epi: x / Non Sq Epi: x / Bacteria: Few            Imaging:           Gastroenterology/Hepatology Progress Note    Interval Events: Patient febrile overnight. Today, patient endorses ongoing abdominal pain. Had episode of diarrhea this morning.     Allergies:  No Known Allergies    Hospital Medications:  acetaminophen     Tablet .. 650 milliGRAM(s) Oral every 6 hours PRN  aluminum hydroxide/magnesium hydroxide/simethicone Suspension 30 milliLiter(s) Oral every 4 hours PRN  lactobacillus acidophilus 1 Tablet(s) Oral daily  methylPREDNISolone sodium succinate Injectable 20 milliGRAM(s) IV Push every 8 hours  morphine  - Injectable 2 milliGRAM(s) IV Push every 4 hours PRN  pantoprazole    Tablet 40 milliGRAM(s) Oral before breakfast  sodium chloride 0.9%. 1000 milliLiter(s) IV Continuous <Continuous>    ROS: 14 point ROS negative unless otherwise state in subjective    PHYSICAL EXAM:   Vital Signs:  Vital Signs Last 24 Hrs  T(C): 37.1 (2023 06:30), Max: 38.2 (2023 22:09)  T(F): 98.7 (2023 06:30), Max: 100.8 (2023 22:09)  HR: 105 (2023 04:40) (82 - 107)  BP: 119/71 (2023 04:40) (90/52 - 123/71)  BP(mean): --  RR: 18 (2023 04:40) (16 - 19)  SpO2: 96% (2023 04:40) (96% - 100%)    Parameters below as of 2023 04:40  Patient On (Oxygen Delivery Method): room air      Daily     Daily     GENERAL:  No acute distress  HEENT:  NCAT, no scleral icterus  CHEST: no resp distress  HEART:  RRR  ABDOMEN:  Soft, TTP in lower abdominal quadrants  EXTREMITIES:  No cyanosis, clubbing, or edema  SKIN:  No rash/erythema/ecchymoses/petechiae/wounds/abscess/warm/dry  NEURO:  Alert and oriented x 3, no asterixis, no tremor    LABS:                        11.0   12.24 )-----------( 425      ( 2023 06:36 )             33.2     Mean Cell Volume: 86.0 fL (06-20-23 @ 06:36)    06-20    133<L>  |  97<L>  |  6<L>  ----------------------------<  104<H>  3.4<L>   |  23  |  0.63    Ca    9.3      2023 06:36  Phos  3.1     -  Mg     1.80         TPro  8.0  /  Alb  3.9  /  TBili  0.5  /  DBili  x   /  AST  48<H>  /  ALT  65<H>  /  AlkPhos  264<H>      LIVER FUNCTIONS - ( 2023 06:54 )  Alb: 3.9 g/dL / Pro: 8.0 g/dL / ALK PHOS: 264 U/L / ALT: 65 U/L / AST: 48 U/L / GGT: x           PT/INR - ( 2023 06:54 )   PT: 18.1 sec;   INR: 1.55 ratio         PTT - ( 2023 06:54 )  PTT:28.2 sec  Urinalysis Basic - ( 2023 10:45 )    Color: Yellow / Appearance: Slightly Turbid / S.022 / pH: x  Gluc: x / Ketone: Small  / Bili: Negative / Urobili: <2 mg/dL   Blood: x / Protein: 30 mg/dL / Nitrite: Negative   Leuk Esterase: Negative / RBC: 1 /HPF / WBC 6 /HPF   Sq Epi: x / Non Sq Epi: x / Bacteria: Few            Imaging:    [facility]  _______________________________________________________________________________  Patient Name: Araceli Tejeda              Procedure Date: 2023 3:10 PM  MRN: [id number]                     Account Number: [id number]  YOB: 2000             Admit Type: Inpatient  Room: [facility] 03                         Gender: Female  Attending MD: AMANDA RODRÍGUEZ MD        _______________________________________________________________________________     Procedure:           Colonoscopy  Indications:         Suspected Crohn's disease, Diarrhea (secondary to                        noninfectious colitis)  Providers:           AMANDA RODRÍGUEZ MD, Meche Juarez (Fellow)  Medicines:           Monitored Anesthesia Care  Complications:       No immediate complications.  Procedure:           Pre-Anesthesia Assessment:                       - Prior to the procedure, a History and Physical was                        performed, and patient medications and allergies were                        reviewed. The patient is competent. The risks and                        benefits of the procedure and the sedation options and                        risks were discussed with the patient. All questions   were answered and informed consent was obtained. Patient                        identification and proposed procedure were verified by                        the physician in the pre-procedure area. Mental Status                        Examination: normal. Prophylactic Antibiotics: The                        patient does not require prophylactic antibiotics. Prior                        Anticoagulants: The patient has taken no previous                        anticoagulant or antiplatelet agents. ASA Grade                        Assessment: II - A patient with mild systemic disease.                        After reviewing the risks and benefits, the patient was                        deemed in satisfactory condition to undergo the            procedure. The anesthesia plan was to use monitored                        anesthesia care (MAC). Immediately prior to                        administration of medications, the patient was                        re-assessed for adequacy to receive sedatives. The heart                        rate, respiratory rate, oxygen saturations, blood                        pressure, adequacy of pulmonary ventilation, and                        response to care were monitored throughout the                 procedure. The physical status of the patient was                        re-assessed after the procedure.                       After I obtained informed consent, the scope was passed                        under direct vision. Throughout the procedure, the                        patient's blood pressure, pulse, and oxygen saturations                        were monitored continuously. The Colonoscope was                        introduced through the anus and advanced to the terminal                        ileum. The colonoscopy was performed without difficulty.                        The patient tolerated the procedure well. The quality of                        the bowel preparation was evaluated using the BBPS          (Palestine Bowel Preparation Scale) with scores of: Right                        Colon = 2 (minor amount of residual staining, small                        fragments of stool and/or opaque liquid, but mucosa seen                        well), Transverse Colon = 3 (entire mucosa seen well                        with no residual staining, small fragments of stool or                        opaque liquid) and Left Colon = 2 (minor amount of                        residual staining, small fragments of stool and/or                        opaque liquid, but mucosa seen well). The total BBPS                        score equals 7. The quality of the bowel preparation was                        fair.                                   Findings:       Skin tags were found on perianal exam.       Patchy moderate inflammation characterized by altered vascularity,        congestion (edema), erosions, erythema, friability, loss of vascularity        and deep ulcerations was found in the entire colon, most notable in the        right colon and at the ileocecal valve. Biopsies were taken with a cold        forceps for histology.       Patchy areas of erythema with diminutive ulcers seen in the terminal        ileum. Ileocecal valve was patulous and ulcerated. Biopsies were taken        with a cold forceps for histology.                                                                                   Impression:          - Preparation of the colonwas fair.                       - Perianal skin tags found on perianal exam.                       - Patchy moderate inflammation was found in the entire                        examined colon, likely secondary to Crohn's disease,     with ileitis and colitis. Biopsied.  Recommendation:      - Return patient to hospital mar for ongoing care.                       - Low residue diet.                       - Await pathology results.                       - Awaiting quantiferon results.                                                                                   Attending Participation:       I was present and participated during the entire procedure, including        non-key portions.                                             _________________  AMANDA RODRÍGUEZ MD  2023 4:29:09 PM  This report has been signed electronically.  Number of Addenda: 0    Note Initiated On: 2023 3:10 PM

## 2023-06-20 NOTE — PROGRESS NOTE ADULT - ASSESSMENT
21 yo F with no significant PMHx presenting with abdominal pain. Patient states she developed lower abdominal cramping and nonbloody diarrhea over a month ago. She presented to the ED on 5/20 with these symptoms at which time she was found to have pancolitis on CT.     #Pancolitis, likely Crohn's disease: Patient presents with one month of nonbloody diarrhea, abdominal pain, weight loss. CT shows pancolitis with ileitis, similar to CT from ED visit on 5/19.    - s/p colonoscopy 6/19 w/ Patchy moderate inflammation was found in the entire examined colon, likely secondary to Crohn's disease,     with ileitis and colitis. Biopsied.  #Heterogeneous enhancement of R hepatic lobe on CT, elevated ALP. Findings not seen on prior CT.      Recommendations  - c/w solumedrol 20 mg IV q8 hrs  - f/u quantiferon gold   - f/u pathology from colon biopsies   - trend CRP  - DVT ppx    GI will continue to follow.     All recommendations are tentative until note is attested by attending.     Meche Juarez, PGY5  Gastroenterology/Hepatology Fellow  Available on [employer] Teams  [phone] ([employer] Short Range Pager)  [phone] (Long Range Pager)    After 5pm, please contact the on-call GI fellow. [phone] 21 yo F with no significant PMHx presenting with abdominal pain. Patient states she developed lower abdominal cramping and nonbloody diarrhea over a month ago. She presented to the ED on 5/20 with these symptoms at which time she was found to have pancolitis on CT.     #Pancolitis, likely Crohn's disease: Patient presents with one month of nonbloody diarrhea, abdominal pain, weight loss. CT shows pancolitis with ileitis, similar to CT from ED visit on 5/19.    - s/p colonoscopy 6/19 w/ Patchy moderate inflammation was found in the entire examined colon, likely secondary to Crohn's disease,     with ileitis and colitis. Biopsied.  #Heterogeneous enhancement of R hepatic lobe on CT, elevated ALP. Findings not seen on prior CT.      Recommendations  - please trend CRP (last checked 6/17)  - c/w solumedrol 20 mg IV q8 hrs  - f/u quantiferon gold   - f/u pathology from colon biopsies   - DVT ppx    GI will continue to follow.     All recommendations are tentative until note is attested by attending.     Meche Juarez, PGY5  Gastroenterology/Hepatology Fellow  Available on [employer] Teams  [phone] ([employer] Short Range Pager)  [phone] (Long Range Pager)    After 5pm, please contact the on-call GI fellow. [phone]

## 2023-06-20 NOTE — PROGRESS NOTE ADULT - SUBJECTIVE AND OBJECTIVE BOX
PROGRESS NOTE:     Patient is a 22y old  Female who presents with a chief complaint of rlq pain (2023 08:41)      SUBJECTIVE / OVERNIGHT EVENTS: Has ongoing pain    ADDITIONAL REVIEW OF SYSTEMS:    MEDICATIONS  (STANDING):  lactobacillus acidophilus 1 Tablet(s) Oral daily  methylPREDNISolone sodium succinate Injectable 20 milliGRAM(s) IV Push every 8 hours  pantoprazole    Tablet 40 milliGRAM(s) Oral before breakfast  potassium chloride   Powder 40 milliEquivalent(s) Oral once  sodium chloride 0.9%. 1000 milliLiter(s) (100 mL/Hr) IV Continuous <Continuous>    MEDICATIONS  (PRN):  acetaminophen     Tablet .. 650 milliGRAM(s) Oral every 6 hours PRN Moderate Pain (4 - 6)  aluminum hydroxide/magnesium hydroxide/simethicone Suspension 30 milliLiter(s) Oral every 4 hours PRN Dyspepsia  morphine  - Injectable 3 milliGRAM(s) IV Push every 4 hours PRN Severe Pain (7 - 10)      CAPILLARY BLOOD GLUCOSE        I&O's Summary      PHYSICAL EXAM:  Vital Signs Last 24 Hrs  T(C): 37.1 (2023 06:30), Max: 38.2 (2023 22:09)  T(F): 98.7 (2023 06:30), Max: 100.8 (2023 22:09)  HR: 105 (2023 04:40) (82 - 107)  BP: 119/71 (2023 04:40) (90/52 - 123/71)  BP(mean): --  RR: 18 (2023 04:40) (16 - 19)  SpO2: 96% (2023 04:40) (96% - 100%)    Parameters below as of 2023 04:40  Patient On (Oxygen Delivery Method): room air        CONSTITUTIONAL: NAD  RESPIRATORY: Normal respiratory effort; lungs are clear to auscultation bilaterally  CARDIOVASCULAR: Regular rate and rhythm, normal S1 and S2, no murmur/rub/gallop; No lower extremity edema; Peripheral pulses are 2+ bilaterally  ABDOMEN: tender to palpation in RLQ/LLQ/suprapubic area  MUSCLOSKELETAL: no clubbing or cyanosis of digits; no joint swelling or tenderness to palpation  PSYCH: A+O to person, place, and time; affect appropriate    LABS:                        11.0   12.24 )-----------( 425      ( 2023 06:36 )             33.2     06-20    133<L>  |  97<L>  |  6<L>  ----------------------------<  104<H>  3.4<L>   |  23  |  0.63    Ca    9.3      2023 06:36  Phos  3.1     06-20  Mg     1.80     -20    TPro  8.0  /  Alb  3.9  /  TBili  0.5  /  DBili  x   /  AST  48<H>  /  ALT  65<H>  /  AlkPhos  264<H>  -19    PT/INR - ( 2023 06:54 )   PT: 18.1 sec;   INR: 1.55 ratio         PTT - ( 2023 06:54 )  PTT:28.2 sec      Urinalysis Basic - ( 2023 10:45 )    Color: Yellow / Appearance: Slightly Turbid / S.022 / pH: x  Gluc: x / Ketone: Small  / Bili: Negative / Urobili: <2 mg/dL   Blood: x / Protein: 30 mg/dL / Nitrite: Negative   Leuk Esterase: Negative / RBC: 1 /HPF / WBC 6 /HPF   Sq Epi: x / Non Sq Epi: x / Bacteria: Few          RADIOLOGY & ADDITIONAL TESTS:  Results Reviewed:   Imaging Personally Reviewed:  Electrocardiogram Personally Reviewed:    COORDINATION OF CARE:  Care Discussed with Consultants/Other Providers [Y/N]:  Prior or Outpatient Records Reviewed [Y/N]:

## 2023-06-20 NOTE — PROGRESS NOTE ADULT - PROBLEM SELECTOR PLAN 1
pw RLQ abdominal pain with diarrhea for a month. CT abd/pelvis with Pancolitis. Wall thickening of the terminal ileum, which may be reactive or reflective of terminal ileitis.  - GI PCR negative, c.diff negative.  fecal calprotein pending   - CRP elevated, hep B negative   - GI consulted, colonoscopy suggestive of Crohn's with "Perianal skin tags found on perianal exam; Patchy moderate inflammation found in the entire examined colon, likely secondary to Crohn's disease"  - increase pain control to Morphine 3mg q4hr prn  -continue steroids, 20mg q8 of solumedrol  - f/u Quantiferon

## 2023-06-21 LAB
ALBUMIN SERPL ELPH-MCNC: 3.4 G/DL — SIGNIFICANT CHANGE UP (ref 3.3–5)
ALP SERPL-CCNC: 280 U/L — HIGH (ref 40–120)
ALT FLD-CCNC: 109 U/L — HIGH (ref 4–33)
ANION GAP SERPL CALC-SCNC: 10 MMOL/L — SIGNIFICANT CHANGE UP (ref 7–14)
AST SERPL-CCNC: 72 U/L — HIGH (ref 4–32)
BILIRUB SERPL-MCNC: <0.2 MG/DL — SIGNIFICANT CHANGE UP (ref 0.2–1.2)
BUN SERPL-MCNC: 7 MG/DL — SIGNIFICANT CHANGE UP (ref 7–23)
CALCIUM SERPL-MCNC: 9.2 MG/DL — SIGNIFICANT CHANGE UP (ref 8.4–10.5)
CHLORIDE SERPL-SCNC: 101 MMOL/L — SIGNIFICANT CHANGE UP (ref 98–107)
CO2 SERPL-SCNC: 23 MMOL/L — SIGNIFICANT CHANGE UP (ref 22–31)
CREAT SERPL-MCNC: 0.57 MG/DL — SIGNIFICANT CHANGE UP (ref 0.5–1.3)
CRP SERPL-MCNC: 178.6 MG/L — HIGH
EGFR: 130 ML/MIN/1.73M2 — SIGNIFICANT CHANGE UP
GLUCOSE SERPL-MCNC: 86 MG/DL — SIGNIFICANT CHANGE UP (ref 70–99)
HCT VFR BLD CALC: 32.7 % — LOW (ref 34.5–45)
HGB BLD-MCNC: 10.7 G/DL — LOW (ref 11.5–15.5)
MAGNESIUM SERPL-MCNC: 2 MG/DL — SIGNIFICANT CHANGE UP (ref 1.6–2.6)
MCHC RBC-ENTMCNC: 28.4 PG — SIGNIFICANT CHANGE UP (ref 27–34)
MCHC RBC-ENTMCNC: 32.7 GM/DL — SIGNIFICANT CHANGE UP (ref 32–36)
MCV RBC AUTO: 86.7 FL — SIGNIFICANT CHANGE UP (ref 80–100)
NRBC # BLD: 0 /100 WBCS — SIGNIFICANT CHANGE UP (ref 0–0)
NRBC # FLD: 0 K/UL — SIGNIFICANT CHANGE UP (ref 0–0)
PHOSPHATE SERPL-MCNC: 3.9 MG/DL — SIGNIFICANT CHANGE UP (ref 2.5–4.5)
PLATELET # BLD AUTO: 455 K/UL — HIGH (ref 150–400)
POTASSIUM SERPL-MCNC: 3.9 MMOL/L — SIGNIFICANT CHANGE UP (ref 3.5–5.3)
POTASSIUM SERPL-SCNC: 3.9 MMOL/L — SIGNIFICANT CHANGE UP (ref 3.5–5.3)
PROT SERPL-MCNC: 7.2 G/DL — SIGNIFICANT CHANGE UP (ref 6–8.3)
RBC # BLD: 3.77 M/UL — LOW (ref 3.8–5.2)
RBC # FLD: 13.7 % — SIGNIFICANT CHANGE UP (ref 10.3–14.5)
SODIUM SERPL-SCNC: 134 MMOL/L — LOW (ref 135–145)
WBC # BLD: 10.32 K/UL — SIGNIFICANT CHANGE UP (ref 3.8–10.5)
WBC # FLD AUTO: 10.32 K/UL — SIGNIFICANT CHANGE UP (ref 3.8–10.5)

## 2023-06-21 PROCEDURE — 99233 SBSQ HOSP IP/OBS HIGH 50: CPT

## 2023-06-21 PROCEDURE — 74182 MRI ABDOMEN W/CONTRAST: CPT | Mod: 26

## 2023-06-21 PROCEDURE — 99232 SBSQ HOSP IP/OBS MODERATE 35: CPT | Mod: GC

## 2023-06-21 RX ORDER — ACETAMINOPHEN 500 MG
650 TABLET ORAL ONCE
Refills: 0 | Status: DISCONTINUED | OUTPATIENT
Start: 2023-06-21 | End: 2023-06-22

## 2023-06-21 RX ORDER — INFLIXIMAB-DYYB 120 MG/ML
375 INJECTION SUBCUTANEOUS ONCE
Refills: 0 | Status: DISCONTINUED | OUTPATIENT
Start: 2023-06-21 | End: 2023-06-21

## 2023-06-21 RX ORDER — INFLIXIMAB-DYYB 120 MG/ML
375 INJECTION SUBCUTANEOUS ONCE
Refills: 0 | Status: COMPLETED | OUTPATIENT
Start: 2023-06-21 | End: 2023-06-21

## 2023-06-21 RX ORDER — DIPHENHYDRAMINE HCL 50 MG
50 CAPSULE ORAL ONCE
Refills: 0 | Status: DISCONTINUED | OUTPATIENT
Start: 2023-06-21 | End: 2023-06-22

## 2023-06-21 RX ADMIN — MORPHINE SULFATE 3 MILLIGRAM(S): 50 CAPSULE, EXTENDED RELEASE ORAL at 14:26

## 2023-06-21 RX ADMIN — MORPHINE SULFATE 3 MILLIGRAM(S): 50 CAPSULE, EXTENDED RELEASE ORAL at 22:45

## 2023-06-21 RX ADMIN — INFLIXIMAB-DYYB 125 MILLIGRAM(S): 120 INJECTION SUBCUTANEOUS at 21:58

## 2023-06-21 RX ADMIN — MORPHINE SULFATE 3 MILLIGRAM(S): 50 CAPSULE, EXTENDED RELEASE ORAL at 18:29

## 2023-06-21 RX ADMIN — MORPHINE SULFATE 3 MILLIGRAM(S): 50 CAPSULE, EXTENDED RELEASE ORAL at 00:30

## 2023-06-21 RX ADMIN — MORPHINE SULFATE 3 MILLIGRAM(S): 50 CAPSULE, EXTENDED RELEASE ORAL at 09:50

## 2023-06-21 RX ADMIN — PANTOPRAZOLE SODIUM 40 MILLIGRAM(S): 20 TABLET, DELAYED RELEASE ORAL at 05:55

## 2023-06-21 RX ADMIN — MORPHINE SULFATE 3 MILLIGRAM(S): 50 CAPSULE, EXTENDED RELEASE ORAL at 13:26

## 2023-06-21 RX ADMIN — MORPHINE SULFATE 3 MILLIGRAM(S): 50 CAPSULE, EXTENDED RELEASE ORAL at 17:29

## 2023-06-21 RX ADMIN — MORPHINE SULFATE 3 MILLIGRAM(S): 50 CAPSULE, EXTENDED RELEASE ORAL at 21:42

## 2023-06-21 RX ADMIN — Medication 1 TABLET(S): at 11:00

## 2023-06-21 RX ADMIN — Medication 20 MILLIGRAM(S): at 05:55

## 2023-06-21 RX ADMIN — Medication 20 MILLIGRAM(S): at 13:18

## 2023-06-21 RX ADMIN — Medication 20 MILLIGRAM(S): at 21:43

## 2023-06-21 RX ADMIN — MORPHINE SULFATE 3 MILLIGRAM(S): 50 CAPSULE, EXTENDED RELEASE ORAL at 08:50

## 2023-06-21 NOTE — PROGRESS NOTE ADULT - SUBJECTIVE AND OBJECTIVE BOX
Gastroenterology/Hepatology Progress Note    Interval Events: Patient states the abdominal pain has improved since she was started on steroids yesterday. Appetite is improving. She had three loose BMs yesterday, one this morning that was slightly blood tinged.    Allergies:  No Known Allergies    Hospital Medications:  acetaminophen     Tablet .. 650 milliGRAM(s) Oral every 6 hours PRN  aluminum hydroxide/magnesium hydroxide/simethicone Suspension 30 milliLiter(s) Oral every 4 hours PRN  lactobacillus acidophilus 1 Tablet(s) Oral daily  methylPREDNISolone sodium succinate Injectable 20 milliGRAM(s) IV Push every 8 hours  morphine  - Injectable 3 milliGRAM(s) IV Push every 4 hours PRN  pantoprazole    Tablet 40 milliGRAM(s) Oral before breakfast  sodium chloride 0.9%. 1000 milliLiter(s) IV Continuous <Continuous>    ROS: 14 point ROS negative unless otherwise state in subjective    PHYSICAL EXAM:   Vital Signs:  Vital Signs Last 24 Hrs  T(C): 36.6 (21 Jun 2023 05:46), Max: 36.7 (20 Jun 2023 19:31)  T(F): 97.9 (21 Jun 2023 05:46), Max: 98.1 (20 Jun 2023 19:31)  HR: 67 (21 Jun 2023 05:46) (67 - 80)  BP: 101/57 (21 Jun 2023 05:46) (101/57 - 123/70)  BP(mean): --  RR: 17 (21 Jun 2023 05:46) (17 - 18)  SpO2: 98% (21 Jun 2023 05:46) (93% - 98%)    Parameters below as of 20 Jun 2023 23:54  Patient On (Oxygen Delivery Method): room air      Daily     Daily     GENERAL:  No acute distress  HEENT:  NCAT, no scleral icterus  CHEST: no resp distress  HEART:  RRR  ABDOMEN:  Soft, non-tender, non-distended   EXTREMITIES:  No cyanosis, clubbing, or edema  SKIN:  No rash/erythema/ecchymoses/petechiae   NEURO:  Alert and oriented x 3, no asterixis    LABS:                        10.7   10.32 )-----------( 455      ( 21 Jun 2023 06:18 )             32.7     Mean Cell Volume: 86.7 fL (06-21-23 @ 06:18)    06-21    134<L>  |  101  |  7   ----------------------------<  86  3.9   |  23  |  0.57    Ca    9.2      21 Jun 2023 06:18  Phos  3.9     06-21  Mg     2.00     06-21    TPro  7.2  /  Alb  3.4  /  TBili  <0.2  /  DBili  x   /  AST  72<H>  /  ALT  109<H>  /  AlkPhos  280<H>  06-21    LIVER FUNCTIONS - ( 21 Jun 2023 06:18 )  Alb: 3.4 g/dL / Pro: 7.2 g/dL / ALK PHOS: 280 U/L / ALT: 109 U/L / AST: 72 U/L / GGT: x             Urinalysis Basic - ( 21 Jun 2023 06:18 )    Color: x / Appearance: x / SG: x / pH: x  Gluc: 86 mg/dL / Ketone: x  / Bili: x / Urobili: x   Blood: x / Protein: x / Nitrite: x   Leuk Esterase: x / RBC: x / WBC x   Sq Epi: x / Non Sq Epi: x / Bacteria: x      Imaging:  reviewed         Gastroenterology/Hepatology Progress Note    Interval Events: Patient states the abdominal pain has improved since she was started on steroids yesterday. Appetite is improving. She had three loose BMs yesterday, one this morning that was slightly blood tinged.    Allergies:  No Known Allergies    Hospital Medications:  acetaminophen     Tablet .. 650 milliGRAM(s) Oral every 6 hours PRN  aluminum hydroxide/magnesium hydroxide/simethicone Suspension 30 milliLiter(s) Oral every 4 hours PRN  lactobacillus acidophilus 1 Tablet(s) Oral daily  methylPREDNISolone sodium succinate Injectable 20 milliGRAM(s) IV Push every 8 hours  morphine  - Injectable 3 milliGRAM(s) IV Push every 4 hours PRN  pantoprazole    Tablet 40 milliGRAM(s) Oral before breakfast  sodium chloride 0.9%. 1000 milliLiter(s) IV Continuous <Continuous>    ROS: 14 point ROS negative unless otherwise state in subjective    PHYSICAL EXAM:   Vital Signs:  Vital Signs Last 24 Hrs  T(C): 36.6 (21 Jun 2023 05:46), Max: 36.7 (20 Jun 2023 19:31)  T(F): 97.9 (21 Jun 2023 05:46), Max: 98.1 (20 Jun 2023 19:31)  HR: 67 (21 Jun 2023 05:46) (67 - 80)  BP: 101/57 (21 Jun 2023 05:46) (101/57 - 123/70)  BP(mean): --  RR: 17 (21 Jun 2023 05:46) (17 - 18)  SpO2: 98% (21 Jun 2023 05:46) (93% - 98%)    Parameters below as of 20 Jun 2023 23:54  Patient On (Oxygen Delivery Method): room air      Daily     Daily     GENERAL:  No acute distress  HEENT:  NCAT, no scleral icterus  CHEST: no resp distress  HEART:  RRR  ABDOMEN:  Soft, mild lower quadrant TTP, non-distended   EXTREMITIES:  No cyanosis, clubbing, or edema  SKIN:  No rash/erythema/ecchymoses/petechiae   NEURO:  Alert and oriented x 3, no asterixis    LABS:                        10.7   10.32 )-----------( 455      ( 21 Jun 2023 06:18 )             32.7     Mean Cell Volume: 86.7 fL (06-21-23 @ 06:18)    06-21    134<L>  |  101  |  7   ----------------------------<  86  3.9   |  23  |  0.57    Ca    9.2      21 Jun 2023 06:18  Phos  3.9     06-21  Mg     2.00     06-21    TPro  7.2  /  Alb  3.4  /  TBili  <0.2  /  DBili  x   /  AST  72<H>  /  ALT  109<H>  /  AlkPhos  280<H>  06-21    LIVER FUNCTIONS - ( 21 Jun 2023 06:18 )  Alb: 3.4 g/dL / Pro: 7.2 g/dL / ALK PHOS: 280 U/L / ALT: 109 U/L / AST: 72 U/L / GGT: x             Urinalysis Basic - ( 21 Jun 2023 06:18 )    Color: x / Appearance: x / SG: x / pH: x  Gluc: 86 mg/dL / Ketone: x  / Bili: x / Urobili: x   Blood: x / Protein: x / Nitrite: x   Leuk Esterase: x / RBC: x / WBC x   Sq Epi: x / Non Sq Epi: x / Bacteria: x      Imaging:  reviewed

## 2023-06-21 NOTE — PROGRESS NOTE ADULT - PROBLEM SELECTOR PLAN 1
pw RLQ abdominal pain with diarrhea for a month. CT abd/pelvis with Pancolitis. Wall thickening of the terminal ileum, which may be reactive or reflective of terminal ileitis.  - GI PCR negative, c.diff negative.  fecal calprotein pending   - CRP elevated, hep B negative   - GI consulted, colonoscopy suggestive of Crohn's with "Perianal skin tags found on perianal exam; Patchy moderate inflammation found in the entire examined colon, likely secondary to Crohn's disease"  - increase pain control to Morphine 3mg q4hr prn  -continue steroids, 20mg q8 of solumedrol  - Quantiferon negative, case d/w Dr. Juarez, to start remicade for inpatient infusion

## 2023-06-21 NOTE — PROGRESS NOTE ADULT - SUBJECTIVE AND OBJECTIVE BOX
PROGRESS NOTE:     Patient is a 22y old  Female who presents with a chief complaint of rlq pain (21 Jun 2023 09:57)      SUBJECTIVE / OVERNIGHT EVENTS: feeling better today, eating breakfast    ADDITIONAL REVIEW OF SYSTEMS:    MEDICATIONS  (STANDING):  lactobacillus acidophilus 1 Tablet(s) Oral daily  methylPREDNISolone sodium succinate Injectable 20 milliGRAM(s) IV Push every 8 hours  pantoprazole    Tablet 40 milliGRAM(s) Oral before breakfast  sodium chloride 0.9%. 1000 milliLiter(s) (100 mL/Hr) IV Continuous <Continuous>    MEDICATIONS  (PRN):  acetaminophen     Tablet .. 650 milliGRAM(s) Oral every 6 hours PRN Moderate Pain (4 - 6)  aluminum hydroxide/magnesium hydroxide/simethicone Suspension 30 milliLiter(s) Oral every 4 hours PRN Dyspepsia  morphine  - Injectable 3 milliGRAM(s) IV Push every 4 hours PRN Severe Pain (7 - 10)      CAPILLARY BLOOD GLUCOSE        I&O's Summary      PHYSICAL EXAM:  Vital Signs Last 24 Hrs  T(C): 36.4 (21 Jun 2023 11:02), Max: 36.7 (20 Jun 2023 19:31)  T(F): 97.6 (21 Jun 2023 11:02), Max: 98.1 (20 Jun 2023 19:31)  HR: 70 (21 Jun 2023 11:02) (67 - 80)  BP: 113/58 (21 Jun 2023 11:02) (101/57 - 123/70)  BP(mean): --  RR: 18 (21 Jun 2023 11:02) (17 - 18)  SpO2: 100% (21 Jun 2023 11:02) (93% - 100%)    Parameters below as of 21 Jun 2023 11:02  Patient On (Oxygen Delivery Method): room air        CONSTITUTIONAL: NAD, well-developed  RESPIRATORY: Normal respiratory effort; lungs are clear to auscultation bilaterally  CARDIOVASCULAR: Regular rate and rhythm, normal S1 and S2, no murmur/rub/gallop; No lower extremity edema; Peripheral pulses are 2+ bilaterally  ABDOMEN: mild TTP in RLQ and LLQ and suprapubic area  MUSCLOSKELETAL: no clubbing or cyanosis of digits; no joint swelling or tenderness to palpation  PSYCH: A+O to person, place, and time; affect appropriate    LABS:                        10.7   10.32 )-----------( 455      ( 21 Jun 2023 06:18 )             32.7     06-21    134<L>  |  101  |  7   ----------------------------<  86  3.9   |  23  |  0.57    Ca    9.2      21 Jun 2023 06:18  Phos  3.9     06-21  Mg     2.00     06-21    TPro  7.2  /  Alb  3.4  /  TBili  <0.2  /  DBili  x   /  AST  72<H>  /  ALT  109<H>  /  AlkPhos  280<H>  06-21          Urinalysis Basic - ( 21 Jun 2023 06:18 )    Color: x / Appearance: x / SG: x / pH: x  Gluc: 86 mg/dL / Ketone: x  / Bili: x / Urobili: x   Blood: x / Protein: x / Nitrite: x   Leuk Esterase: x / RBC: x / WBC x   Sq Epi: x / Non Sq Epi: x / Bacteria: x        Culture - Blood (collected 19 Jun 2023 06:54)  Source: .Blood Blood  Preliminary Report (20 Jun 2023 11:01):    No growth to date.    Culture - Blood (collected 19 Jun 2023 06:51)  Source: .Blood Blood-Peripheral  Preliminary Report (20 Jun 2023 11:01):    No growth to date.        RADIOLOGY & ADDITIONAL TESTS:  Results Reviewed:   Imaging Personally Reviewed:  Electrocardiogram Personally Reviewed:    COORDINATION OF CARE:  Care Discussed with Consultants/Other Providers [Y/N]:  Prior or Outpatient Records Reviewed [Y/N]:

## 2023-06-21 NOTE — PROGRESS NOTE ADULT - ASSESSMENT
21 yo F with no significant PMHx presenting with abdominal pain. Patient states she developed lower abdominal cramping and nonbloody diarrhea over a month ago. She presented to the ED on 5/20 with these symptoms at which time she was found to have pancolitis on CT.     #Pancolitis, likely Crohn's disease: Patient presents with one month of nonbloody diarrhea, abdominal pain, weight loss. CT shows pancolitis with ileitis, similar to CT from ED visit on 5/19.    - s/p colonoscopy 6/19 w/ Patchy moderate inflammation was found in the entire examined colon, likely secondary to Crohn's disease,     with ileitis and colitis. Biopsied.  - quantiferon negative  - CRP uptrending as of 6/21  #Heterogeneous enhancement of R hepatic lobe on CT, elevated ALP. Findings not seen on prior CT.    MR c/f acute hepatitis    Recommendations  - please trend CRP daily  - c/w solumedrol 20 mg IV q8 hrs  - f/u pathology from colon biopsies   - DVT ppx    GI will continue to follow.     All recommendations are tentative until note is attested by attending.     Meche Juarez, PGY5  Gastroenterology/Hepatology Fellow  Available on [employer] Teams  [phone] ([employer] Short Range Pager)  [phone] (Long Range Pager)    After 5pm, please contact the on-call GI fellow. [phone] 23 yo F with no significant PMHx presenting with abdominal pain. Patient states she developed lower abdominal cramping and nonbloody diarrhea over a month ago. She presented to the ED on 5/20 with these symptoms at which time she was found to have pancolitis on CT.     #Pancolitis, likely Crohn's disease: Patient presents with one month of nonbloody diarrhea, abdominal pain, weight loss. CT shows pancolitis with ileitis, similar to CT from ED visit on 5/19.    - s/p colonoscopy 6/19 w/ Patchy moderate inflammation was found in the entire examined colon, likely secondary to Crohn's disease,     with ileitis and colitis. Biopsied.  - quantiferon negative  - CRP uptrending as of 6/21  #Heterogeneous enhancement of R hepatic lobe on CT, elevated ALP. Findings not seen on prior CT.    MR c/f acute hepatitis    Recommendations  - please trend CRP daily  - c/w solumedrol 20 mg IV q8 hrs  - will plan to initiate remicade today if patient is amenable  - f/u pathology from colon biopsies   - DVT ppx    GI will continue to follow.     All recommendations are tentative until note is attested by attending.     Meche Juarez, PGY5  Gastroenterology/Hepatology Fellow  Available on [employer] Teams  [phone] ([employer] Short Range Pager)  [phone] (Long Range Pager)    After 5pm, please contact the on-call GI fellow. [phone]

## 2023-06-22 ENCOUNTER — TRANSCRIPTION ENCOUNTER (OUTPATIENT)
Age: 23
End: 2023-06-22

## 2023-06-22 VITALS
RESPIRATION RATE: 17 BRPM | TEMPERATURE: 98 F | DIASTOLIC BLOOD PRESSURE: 63 MMHG | SYSTOLIC BLOOD PRESSURE: 106 MMHG | HEART RATE: 66 BPM | OXYGEN SATURATION: 100 %

## 2023-06-22 DIAGNOSIS — K50.90 CROHN'S DISEASE, UNSPECIFIED, W/OUT COMPLICATIONS: ICD-10-CM

## 2023-06-22 LAB
ALBUMIN SERPL ELPH-MCNC: 3.4 G/DL — SIGNIFICANT CHANGE UP (ref 3.3–5)
ALP SERPL-CCNC: 232 U/L — HIGH (ref 40–120)
ALT FLD-CCNC: 66 U/L — HIGH (ref 4–33)
ANION GAP SERPL CALC-SCNC: 15 MMOL/L — HIGH (ref 7–14)
AST SERPL-CCNC: 19 U/L — SIGNIFICANT CHANGE UP (ref 4–32)
AUTO DIFF PNL BLD: NEGATIVE — SIGNIFICANT CHANGE UP
BILIRUB SERPL-MCNC: <0.2 MG/DL — SIGNIFICANT CHANGE UP (ref 0.2–1.2)
BUN SERPL-MCNC: 14 MG/DL — SIGNIFICANT CHANGE UP (ref 7–23)
C-ANCA SER-ACNC: NEGATIVE — SIGNIFICANT CHANGE UP
CALCIUM SERPL-MCNC: 9.3 MG/DL — SIGNIFICANT CHANGE UP (ref 8.4–10.5)
CHLORIDE SERPL-SCNC: 101 MMOL/L — SIGNIFICANT CHANGE UP (ref 98–107)
CO2 SERPL-SCNC: 22 MMOL/L — SIGNIFICANT CHANGE UP (ref 22–31)
CREAT SERPL-MCNC: 0.6 MG/DL — SIGNIFICANT CHANGE UP (ref 0.5–1.3)
CRP SERPL-MCNC: 90.4 MG/L — HIGH
EGFR: 128 ML/MIN/1.73M2 — SIGNIFICANT CHANGE UP
GLUCOSE SERPL-MCNC: 110 MG/DL — HIGH (ref 70–99)
HAV IGM SER-ACNC: SIGNIFICANT CHANGE UP
HAV IGM SER-ACNC: SIGNIFICANT CHANGE UP
HBV CORE IGM SER-ACNC: SIGNIFICANT CHANGE UP
HBV CORE IGM SER-ACNC: SIGNIFICANT CHANGE UP
HBV SURFACE AG SER-ACNC: SIGNIFICANT CHANGE UP
HBV SURFACE AG SER-ACNC: SIGNIFICANT CHANGE UP
HCT VFR BLD CALC: 32.2 % — LOW (ref 34.5–45)
HCV AB S/CO SERPL IA: 0.11 S/CO — SIGNIFICANT CHANGE UP (ref 0–0.99)
HCV AB S/CO SERPL IA: 0.11 S/CO — SIGNIFICANT CHANGE UP (ref 0–0.99)
HCV AB SERPL-IMP: SIGNIFICANT CHANGE UP
HCV AB SERPL-IMP: SIGNIFICANT CHANGE UP
HGB BLD-MCNC: 10.4 G/DL — LOW (ref 11.5–15.5)
MAGNESIUM SERPL-MCNC: 2 MG/DL — SIGNIFICANT CHANGE UP (ref 1.6–2.6)
MCHC RBC-ENTMCNC: 28.9 PG — SIGNIFICANT CHANGE UP (ref 27–34)
MCHC RBC-ENTMCNC: 32.3 GM/DL — SIGNIFICANT CHANGE UP (ref 32–36)
MCV RBC AUTO: 89.4 FL — SIGNIFICANT CHANGE UP (ref 80–100)
NRBC # BLD: 0 /100 WBCS — SIGNIFICANT CHANGE UP (ref 0–0)
NRBC # FLD: 0 K/UL — SIGNIFICANT CHANGE UP (ref 0–0)
P-ANCA SER-ACNC: NEGATIVE — SIGNIFICANT CHANGE UP
PHOSPHATE SERPL-MCNC: 3.8 MG/DL — SIGNIFICANT CHANGE UP (ref 2.5–4.5)
PLATELET # BLD AUTO: 475 K/UL — HIGH (ref 150–400)
POTASSIUM SERPL-MCNC: 4.2 MMOL/L — SIGNIFICANT CHANGE UP (ref 3.5–5.3)
POTASSIUM SERPL-SCNC: 4.2 MMOL/L — SIGNIFICANT CHANGE UP (ref 3.5–5.3)
PROT SERPL-MCNC: 6.7 G/DL — SIGNIFICANT CHANGE UP (ref 6–8.3)
RBC # BLD: 3.6 M/UL — LOW (ref 3.8–5.2)
RBC # FLD: 13.7 % — SIGNIFICANT CHANGE UP (ref 10.3–14.5)
SMOOTH MUSCLE AB SER-ACNC: SIGNIFICANT CHANGE UP
SODIUM SERPL-SCNC: 138 MMOL/L — SIGNIFICANT CHANGE UP (ref 135–145)
SURGICAL PATHOLOGY STUDY: SIGNIFICANT CHANGE UP
WBC # BLD: 11.15 K/UL — HIGH (ref 3.8–10.5)
WBC # FLD AUTO: 11.15 K/UL — HIGH (ref 3.8–10.5)

## 2023-06-22 PROCEDURE — 99239 HOSP IP/OBS DSCHRG MGMT >30: CPT

## 2023-06-22 PROCEDURE — 99232 SBSQ HOSP IP/OBS MODERATE 35: CPT | Mod: GC

## 2023-06-22 RX ORDER — MORPHINE SULFATE 50 MG/1
3 CAPSULE, EXTENDED RELEASE ORAL EVERY 4 HOURS
Refills: 0 | Status: DISCONTINUED | OUTPATIENT
Start: 2023-06-22 | End: 2023-06-22

## 2023-06-22 RX ORDER — PANTOPRAZOLE SODIUM 20 MG/1
1 TABLET, DELAYED RELEASE ORAL
Qty: 14 | Refills: 0
Start: 2023-06-22 | End: 2023-07-05

## 2023-06-22 RX ADMIN — Medication 20 MILLIGRAM(S): at 13:37

## 2023-06-22 RX ADMIN — MORPHINE SULFATE 3 MILLIGRAM(S): 50 CAPSULE, EXTENDED RELEASE ORAL at 06:33

## 2023-06-22 RX ADMIN — MORPHINE SULFATE 3 MILLIGRAM(S): 50 CAPSULE, EXTENDED RELEASE ORAL at 07:32

## 2023-06-22 RX ADMIN — Medication 20 MILLIGRAM(S): at 05:21

## 2023-06-22 RX ADMIN — PANTOPRAZOLE SODIUM 40 MILLIGRAM(S): 20 TABLET, DELAYED RELEASE ORAL at 05:21

## 2023-06-22 NOTE — PROGRESS NOTE ADULT - ATTENDING COMMENTS
Patient reports significant improvement in symptoms after receiving first dose of Inflectra yesterday. Notes resolution of abdominal pain and improvement in bowel consistency. Labs with improving liver tests and CRP. Suggest transition to PO prednisone 40mg and follow up with outpatient GI as scheduled next week for tapering. Will need second dose of Infliximab/Inflectra in 2 weeks, which patient is also aware of. Should have liver tests repeated as outpatient next week, currently downtrending and suspect in setting of acute inflammatory process.  Additional recommendations as above.
S/p colonoscopy yesterday with evidence of ileocolitis, biopsies obtained, but pending. Given negative infectious workup, FH of Crohn's disease (sister) and recent endoscopic findings, suspect likely new diagnosis of Crohn's disease. Will benefit from biologic therapy. Still awaiting Quant Gold. Given significant pain persisting with altered bowel habits, initiated on IV steroids. Please trend inflammatory markers.   Upon discharge, patient requesting to follow up with her sister's gastroenterologist.   Additional recommendations as above.
# Ileocolitis c/f new diagnosis of Crohn's disease: Given negative infectious workup, FH of Crohn's disease (sister) and recent endoscopic findings, suspect likely new diagnosis of Crohn's disease  # Elevated liver tests, abnormal MR imaging    --Follow up path from recent colonoscopy  --Continue IV steroids  --Continue to trend daily CRP  --Patient eager to initiate biologic to minimize steroid course. Discussed initiation of Infliximab while inpatient as well as r/b/a of Infliximab including, but not limited to allergic and/or infusion reactions, infections, and malignancy (e.g. lymphoma, skin cancer). Patient is agreeable  --Continue to trend liver tests; may be in setting of acute inflammatory process, but will ask hepatology to weigh in as well  --DVT prophylaxis  --Upon discharge, patient requesting to follow up with her sister's gastroenterologist    Additional recommendations as above.

## 2023-06-22 NOTE — DIETITIAN INITIAL EVALUATION ADULT - OTHER INFO
Per chart review, 21 yo f with family h/o IBD presenting with suspected new-onset Crohn's disease.     Patient seen at bedside. Reports her appetite is "fine" in hospital, able to consume approximately 50-75% of her meals. Pt endorses her abdominal pain has improved. Diarrhea has improved significantly with formed stool. Last BM today reported by pt. Denies any other GI distress (nausea/vomiting/constipation.) GI is following. Currently on steroid. Pt's diet initiated on clear liquid diet. Diet has been advanced for regular/low fiber. Nutrition education provided on low fiber diet while in hospital. Encouraged patient to gradually increase fiber intake when recovered. Irritable Bowel Syndrome (IBS) Nutrition Therapy handout provided to patient. Pt shows understanding. RD to remain available for further nutritional interventions as indicated.

## 2023-06-22 NOTE — PROGRESS NOTE ADULT - SUBJECTIVE AND OBJECTIVE BOX
Gastroenterology/Hepatology Progress Note    Interval Events: Patient is s/p first Remicade dose yesterday. She states that her abdominal pain has significantly improved.      Allergies:  No Known Allergies    Hospital Medications:  acetaminophen     Tablet .. 650 milliGRAM(s) Oral once  acetaminophen     Tablet .. 650 milliGRAM(s) Oral every 6 hours PRN  aluminum hydroxide/magnesium hydroxide/simethicone Suspension 30 milliLiter(s) Oral every 4 hours PRN  diphenhydrAMINE Injectable 50 milliGRAM(s) IV Push once  lactobacillus acidophilus 1 Tablet(s) Oral daily  methylPREDNISolone sodium succinate Injectable 20 milliGRAM(s) IV Push every 8 hours  morphine  - Injectable 3 milliGRAM(s) IV Push every 4 hours PRN  pantoprazole    Tablet 40 milliGRAM(s) Oral before breakfast  sodium chloride 0.9%. 1000 milliLiter(s) IV Continuous <Continuous>    ROS: 14 point ROS negative unless otherwise state in subjective    PHYSICAL EXAM:   Vital Signs:  Vital Signs Last 24 Hrs  T(C): 36.4 (22 Jun 2023 05:38), Max: 36.5 (21 Jun 2023 21:32)  T(F): 97.5 (22 Jun 2023 05:38), Max: 97.7 (21 Jun 2023 21:32)  HR: 62 (22 Jun 2023 05:38) (62 - 70)  BP: 109/60 (22 Jun 2023 05:38) (109/60 - 117/77)  BP(mean): --  RR: 17 (22 Jun 2023 05:38) (17 - 18)  SpO2: 100% (22 Jun 2023 05:38) (100% - 100%)    Parameters below as of 22 Jun 2023 05:38  Patient On (Oxygen Delivery Method): room air    Daily     Daily     GENERAL:  No acute distress  HEENT:  NCAT, no scleral icterus  CHEST: no resp distress  HEART:  RRR  ABDOMEN:  Soft, non-tender, non-distended  EXTREMITIES:  No cyanosis, clubbing, or edema  SKIN:  No rash/erythema/ecchymoses  NEURO:  Alert and oriented x 3    LABS:                        10.4   11.15 )-----------( 475      ( 22 Jun 2023 07:21 )             32.2     Mean Cell Volume: 89.4 fL (06-22-23 @ 07:21)    06-22    138  |  101  |  14  ----------------------------<  110<H>  4.2   |  22  |  0.60    Ca    9.3      22 Jun 2023 07:21  Phos  3.8     06-22  Mg     2.00     06-22    TPro  6.7  /  Alb  3.4  /  TBili  <0.2  /  DBili  x   /  AST  19  /  ALT  66<H>  /  AlkPhos  232<H>  06-22    LIVER FUNCTIONS - ( 22 Jun 2023 07:21 )  Alb: 3.4 g/dL / Pro: 6.7 g/dL / ALK PHOS: 232 U/L / ALT: 66 U/L / AST: 19 U/L / GGT: x           Urinalysis Basic - ( 22 Jun 2023 07:21 )    Color: x / Appearance: x / SG: x / pH: x  Gluc: 110 mg/dL / Ketone: x  / Bili: x / Urobili: x   Blood: x / Protein: x / Nitrite: x   Leuk Esterase: x / RBC: x / WBC x   Sq Epi: x / Non Sq Epi: x / Bacteria: x      Imaging:  ACC: [id number] EXAM:  MR ABDOMEN IC   ORDERED BY: BECKY PANDA     PROCEDURE DATE:  06/21/2023      INTERPRETATION:  CLINICAL INFORMATION: Chronic abdominal pain with   diarrhea. Abnormal liver findings on CT scan. Elevated alkaline   phosphatase.    COMPARISON: 6/15/2023    CONTRAST/COMPLICATIONS:  IV Contrast: Gadavist  7.5 cc administered   0 cc discarded  Oral Contrast: NONE  Complications: None reported at time of study completion    PROCEDURE:  MRI of the abdomen was performed.  MRCP was performed.    FINDINGS:  LOWER CHEST: Within normal limits.    LIVER: 4 mm cyst in the right hepatic lobe. Enlarged liver with mild   heterogeneous signal intensity most prominent on diffusion-weighted   imaging. No focal liver lesions. No fatty infiltration. Enlarged liver.   Right hepatic lobe measures 20.2 cm in craniocaudal diameter.  BILE DUCTS: Normal caliber. No biliary ductal dilatation or stricture. No   evidence of choledocholithiasis.  GALLBLADDER: Contracted.  SPLEEN: Within normal limits.  PANCREAS: Within normal limits.  ADRENALS: Within normal limits.  KIDNEYS/URETERS: Right lower pole renal cyst measures 8 mm. 3 mm cyst in   the upper pole the left kidney. No hydronephrosis.    VISUALIZED PORTIONS:  BOWEL: Wall thickening of the colon, as seen on prior CT.  PERITONEUM: No ascites.  VESSELS: Within normal limits.  RETROPERITONEUM/LYMPH NODES: No lymphadenopathy.  ABDOMINAL WALL: Within normal limits.  BONES: Within normal limits.    IMPRESSION:  *  Enlarged liver with milddiffuse signal abnormality most prominent   diffusion-weighted imaging. Findings suggest acute hepatitis.  *  No suspicious liver lesions or morphologic changes of cirrhosis.  *  No evidence of biliary ductal dilatation or stricture.  *  Diffuse wallthickening of the colon as seen on prior CT      --- End of Report ---      SERGEY DALTON MD; Attending Radiologist  This document has been electronically signed. Jun 21 2023  9:01AM

## 2023-06-22 NOTE — PROGRESS NOTE ADULT - SUBJECTIVE AND OBJECTIVE BOX
PROGRESS NOTE:     Patient is a 22y old  Female who presents with a chief complaint of rlq pain (22 Jun 2023 10:09)      SUBJECTIVE / OVERNIGHT EVENTS: ready to go home, less pain     ADDITIONAL REVIEW OF SYSTEMS:    MEDICATIONS  (STANDING):  acetaminophen     Tablet .. 650 milliGRAM(s) Oral once  diphenhydrAMINE Injectable 50 milliGRAM(s) IV Push once  lactobacillus acidophilus 1 Tablet(s) Oral daily  methylPREDNISolone sodium succinate Injectable 20 milliGRAM(s) IV Push every 8 hours  pantoprazole    Tablet 40 milliGRAM(s) Oral before breakfast  sodium chloride 0.9%. 1000 milliLiter(s) (100 mL/Hr) IV Continuous <Continuous>    MEDICATIONS  (PRN):  acetaminophen     Tablet .. 650 milliGRAM(s) Oral every 6 hours PRN Moderate Pain (4 - 6)  aluminum hydroxide/magnesium hydroxide/simethicone Suspension 30 milliLiter(s) Oral every 4 hours PRN Dyspepsia  morphine  - Injectable 3 milliGRAM(s) IV Push every 4 hours PRN Severe Pain (7 - 10)      CAPILLARY BLOOD GLUCOSE        I&O's Summary      PHYSICAL EXAM:  Vital Signs Last 24 Hrs  T(C): 36.4 (22 Jun 2023 05:38), Max: 36.5 (21 Jun 2023 21:32)  T(F): 97.5 (22 Jun 2023 05:38), Max: 97.7 (21 Jun 2023 21:32)  HR: 62 (22 Jun 2023 05:38) (62 - 67)  BP: 109/60 (22 Jun 2023 05:38) (109/60 - 117/77)  BP(mean): --  RR: 17 (22 Jun 2023 05:38) (17 - 18)  SpO2: 100% (22 Jun 2023 05:38) (100% - 100%)    Parameters below as of 22 Jun 2023 05:38  Patient On (Oxygen Delivery Method): room air        CONSTITUTIONAL: NAD, well-developed  RESPIRATORY: Normal respiratory effort; lungs are clear to auscultation bilaterally  CARDIOVASCULAR: Regular rate and rhythm, normal S1 and S2, no murmur/rub/gallop; No lower extremity edema; Peripheral pulses are 2+ bilaterally  ABDOMEN: Nontender to palpation, normoactive bowel sounds, no rebound/guarding; No hepatosplenomegaly  MUSCLOSKELETAL: no clubbing or cyanosis of digits; no joint swelling or tenderness to palpation  PSYCH: A+O to person, place, and time; affect appropriate    LABS:                        10.4   11.15 )-----------( 475      ( 22 Jun 2023 07:21 )             32.2     06-22    138  |  101  |  14  ----------------------------<  110<H>  4.2   |  22  |  0.60    Ca    9.3      22 Jun 2023 07:21  Phos  3.8     06-22  Mg     2.00     06-22    TPro  6.7  /  Alb  3.4  /  TBili  <0.2  /  DBili  x   /  AST  19  /  ALT  66<H>  /  AlkPhos  232<H>  06-22          Urinalysis Basic - ( 22 Jun 2023 07:21 )    Color: x / Appearance: x / SG: x / pH: x  Gluc: 110 mg/dL / Ketone: x  / Bili: x / Urobili: x   Blood: x / Protein: x / Nitrite: x   Leuk Esterase: x / RBC: x / WBC x   Sq Epi: x / Non Sq Epi: x / Bacteria: x          RADIOLOGY & ADDITIONAL TESTS:  Results Reviewed:   Imaging Personally Reviewed:  Electrocardiogram Personally Reviewed:    COORDINATION OF CARE:  Care Discussed with Consultants/Other Providers [Y/N]:  Prior or Outpatient Records Reviewed [Y/N]:

## 2023-06-22 NOTE — DIETITIAN INITIAL EVALUATION ADULT - PERTINENT MEDS FT
MEDICATIONS  (STANDING):  acetaminophen     Tablet .. 650 milliGRAM(s) Oral once  diphenhydrAMINE Injectable 50 milliGRAM(s) IV Push once  lactobacillus acidophilus 1 Tablet(s) Oral daily  methylPREDNISolone sodium succinate Injectable 20 milliGRAM(s) IV Push every 8 hours  pantoprazole    Tablet 40 milliGRAM(s) Oral before breakfast  sodium chloride 0.9%. 1000 milliLiter(s) (100 mL/Hr) IV Continuous <Continuous>    MEDICATIONS  (PRN):  acetaminophen     Tablet .. 650 milliGRAM(s) Oral every 6 hours PRN Moderate Pain (4 - 6)  aluminum hydroxide/magnesium hydroxide/simethicone Suspension 30 milliLiter(s) Oral every 4 hours PRN Dyspepsia  morphine  - Injectable 3 milliGRAM(s) IV Push every 4 hours PRN Severe Pain (7 - 10)

## 2023-06-22 NOTE — DISCHARGE NOTE PROVIDER - NSDCCPCAREPLAN_GEN_ALL_CORE_FT
PRINCIPAL DISCHARGE DIAGNOSIS  Diagnosis: Pancolitis  Assessment and Plan of Treatment: You had a colonoscopy which showed inflammation throughout the entire colon likely due to new onset crohn's disease. You had a biopsy of your colon taken. Your inflammatory blood work markers were elevated and downtrended throughout the hospital course. You were treated with IV steroid to help decrease inflammation. You had your first Remicaid infusion on 6/21/23. Your pain significantly improved. You should continue taking steroids with Prednisone as prescribed upon discharge. You should take Protonix as prescribed to help protect the stomach / GI lining while taking steroids. Follow up with GI Dr. Poole next week.      SECONDARY DISCHARGE DIAGNOSES  Diagnosis: Transaminitis  Assessment and Plan of Treatment: You were found to have elevated liver enzymes which are now dowtrending and improving. You had a CT scan of the abdomen which showed liver enhancement. You then had a MRI of the liver to further evaluate which showed findings suggestive of acute hepatitis (inflammation of the liver). Your blood work hepatitis panel was negative for acute infection. You were follow by GI who suggested MRI findings are likely in the setting of acute inflammatory process. Your liver enzymes are stable. Follow up with GI for repeat blood work and monitoring of liver enzymes.     PRINCIPAL DISCHARGE DIAGNOSIS  Diagnosis: Pancolitis  Assessment and Plan of Treatment: You had a colonoscopy which showed inflammation throughout the entire colon likely due to new onset crohn's disease. You had a biopsy of your colon taken. Your inflammatory blood work markers were elevated and downtrended throughout the hospital course. You were treated with IV steroid to help decrease inflammation. You had your first Remicaid infusion on 6/21/23. Your pain significantly improved. You should continue taking steroids with Prednisone as prescribed UNTIL YOU FOLLOW UP WITH GI upon discharge. Once you follow up with GI, please defer to GI whether or not you should continue with taking steroid as prescribed. You should take Protonix as prescribed to help protect the stomach / GI lining while taking steroids. Follow up with GI Dr. Poole next week.      SECONDARY DISCHARGE DIAGNOSES  Diagnosis: Transaminitis  Assessment and Plan of Treatment: You were found to have elevated liver enzymes which are now dowtrending and improving. You had a CT scan of the abdomen which showed liver enhancement. You then had a MRI of the liver to further evaluate which showed findings suggestive of acute hepatitis (inflammation of the liver). Your blood work hepatitis panel was negative for acute infection. You were follow by GI who suggested MRI findings are likely in the setting of acute inflammatory process. Your liver enzymes are stable. Follow up with GI for repeat blood work and monitoring of liver enzymes.

## 2023-06-22 NOTE — DISCHARGE NOTE PROVIDER - HOSPITAL COURSE
23 yo f with family h/o IBD presenting with suspected new-onset Crohn's disease.       Suspected New Onset Crohn's disease  - P/w RLQ abdominal pain with diarrhea for a month; High suspicion for Crohn's given CT findings and first degree family history    - CT abd/pelvis with Pancolitis. Wall thickening of the terminal ileum  - GI PCR NEG, C Diff PCR NEG  -  (elevated), improved, downtrending   - 6/19 Colonoscopy Perianal skin tags, inflammation in entire colon likely 2/2 crohn dz with ileitis and colitis, biopsied; path pending   - S/p pain control with Morphine IV PRN - pain significantly improved   - IV Solumedrol while inpt; transition to PO Prednisone 40mg daily upon DC   - S/p first Remicaid infusion on 6/21   - Pt made an appt with GI Dr. Crump next week     Transaminitis    - CT abd with heterogenous liver enhancement. no cbd dilation.    - MRI abd: findings suggest acute hepatitis   - Hepatitis panel negative   - May be in setting of acute inflammatory process   - LFTs downtrending, improved     Low back pain   - XR Mild sclerotic changes along iliac margins of SI joints compatible with stigmata of subacute to chronic sacroiliitis. No fractures or dislocations. Unremarkable visualized lower lumbar spine. No discrete suspicious lytic or blastic lesions.  - Pain improved     On ___ this case was reviewed with  ____, the patient is medically stable and optimized for discharge. All medications were reviewed and prescriptions were sent to mutually agreed upon pharmacy. 23 yo f with family h/o IBD presenting with suspected new-onset Crohn's disease.       Suspected New Onset Crohn's disease  - P/w RLQ abdominal pain with diarrhea for a month; High suspicion for Crohn's given CT findings and first degree family history    - CT abd/pelvis with Pancolitis. Wall thickening of the terminal ileum  - GI PCR NEG, C Diff PCR NEG  -  (elevated), improved, downtrending   - 6/19 Colonoscopy Perianal skin tags, inflammation in entire colon likely 2/2 crohn dz with ileitis and colitis, biopsied; path pending   - S/p pain control with Morphine IV PRN - pain significantly improved   - IV Solumedrol while inpt; transition to PO Prednisone 40mg daily until f/u with GI as outpt upon DC   - S/p first Remicaid infusion on 6/21   - Pt made an appt with GI Dr. Crump next week     Transaminitis    - CT abd with heterogenous liver enhancement. no cbd dilation.    - MRI abd: findings suggest acute hepatitis   - Hepatitis panel negative   - May be in setting of acute inflammatory process   - LFTs downtrending, improved     Low back pain   - XR Mild sclerotic changes along iliac margins of SI joints compatible with stigmata of subacute to chronic sacroiliitis. No fractures or dislocations. Unremarkable visualized lower lumbar spine. No discrete suspicious lytic or blastic lesions.  - Pain improved     On 6/22/23 this case was reviewed with Dr. Bates. The patient is medically stable and optimized for discharge. All medications were reviewed and prescriptions were sent to mutually agreed upon pharmacy.

## 2023-06-22 NOTE — PROGRESS NOTE ADULT - ASSESSMENT
21 yo F with no significant PMHx presenting with abdominal pain. Patient states she developed lower abdominal cramping and nonbloody diarrhea over a month ago. She presented to the ED on 5/20 with these symptoms at which time she was found to have pancolitis on CT.     #Pancolitis, likely Crohn's disease: Patient presents with one month of nonbloody diarrhea, abdominal pain, weight loss. CT shows pancolitis with ileitis, similar to CT from ED visit on 5/19.    - s/p colonoscopy 6/19 w/ Patchy moderate inflammation was found in the entire examined colon, likely secondary to Crohn's disease,     with ileitis and colitis. Biopsied.  - quantiferon negative  - CRP downtrending   #Heterogeneous enhancement of R hepatic lobe on CT. Patient w/ rising elevated ALP, transaminases on admission which are now downtrending. MR c/f acute hepatitis. Hepatology following    Recommendations  - please trend CRP daily  - c/w solumedrol 20 mg IV q8 hrs today -> can switch to prednisone 40 mg daily tomorrow  - will assist with arranging her 2nd dose of remicade as outpatient  - patient wants to f/u with GI Dr. Crump after discharge  - f/u pathology from colon biopsies   - continue to trend transaminases, appreciate hepatology recs  - DVT ppx    GI will continue to follow.     All recommendations are tentative until note is attested by attending.     Meche Juarez, PGY5  Gastroenterology/Hepatology Fellow  Available on Microsoft Teams  [phone] ([employer] Short Range Pager)  [phone] (Long Range Pager)    After 5pm, please contact the on-call GI fellow. [phone]

## 2023-06-22 NOTE — DIETITIAN INITIAL EVALUATION ADULT - ADD RECOMMEND
1) Encourage PO intake and honor food preferences as able.   2) Recommend continue with current diet, which remains appropriate at this time.   3) Monitor PO intake, Labs, weights, BMs, and skin integrity.   4) RD to remain available for further nutritional interventions as indicated.

## 2023-06-22 NOTE — PROGRESS NOTE ADULT - PROBLEM SELECTOR PROBLEM 4
Encounter for deep vein thrombosis (DVT) prophylaxis

## 2023-06-22 NOTE — PROGRESS NOTE ADULT - PROBLEM SELECTOR PLAN 2
-high suspicion for Crohn's given CT findings and first degree family history   -f/u bx  -continue steroids, PPI, follow glucose on BMP  -plan as above
-high suspicion for Crohn's given CT findings and first degree family history   -for second dose of remicade as o/p   -plan as above
-high suspicion for Crohn's given CT findings and first degree family history   -supportive care with IVF, prn pain meds  - GI recs  -appendix not definitively visualized, but unlikely that simultaneous terminal  ileitis presenting with appendicitis. however, if pt osmin increasing signs of sepsis /worsening abd pain, would call surgery
-high suspicion for Crohn's given CT findings and first degree family history   -f/u bx  -continue steroids, PPI, follow glucose on BMP  -plan as above
-high suspicion for Crohn's given CT findings and first degree family history   -supportive care with IVF, prn pain meds  - GI recs  -appendix not definitively visualized, but unlikely that simultaneous terminal  ileitis presenting with appendicitis. however, if pt osmin increasing signs of sepsis /worsening abd pain, would call surgery
-high suspicion for Crohn's given CT findings and first degree family history   -supportive care with IVF, prn pain meds  -liquid diet  - GI recs  -appendix not definitively visualized, but unlikely that simultaneous terminal  ileitis presenting with appendicitis. however, if pt osmin increasing signs of sepsis /worsening abd pain, would call surgery
-high suspicion for Crohn's given CT findings and first degree family history   -supportive care with IVF, prn pain meds  - GI recs  -appendix not definitively visualized, but unlikely that simultaneous terminal  ileitis presenting with appendicitis. however, if pt osmin increasing signs of sepsis /worsening abd pain, would call surgery

## 2023-06-22 NOTE — PROGRESS NOTE ADULT - PROVIDER SPECIALTY LIST ADULT
Gastroenterology
Anesthesia
Gastroenterology
Gastroenterology
Hospitalist

## 2023-06-22 NOTE — PROGRESS NOTE ADULT - PROBLEM SELECTOR PLAN 3
Elevated alk phos, normal AST/ALT. CT abd with heterogenous liver enhancement. no cbd dilation.      - fu hep serology  - fu MR ABD   - monitor LFTs
Elevated alk phos, normal AST/ALT. CT abd with heterogenous liver enhancement. no cbd dilation.      - fu hep serology- Hep B negative.   - continue to monitor LFTs, increased today  - MR suggestive of acute hepatitis; will check autoimmunue evaluation
Elevated alk phos, normal AST/ALT. CT abd with heterogenous liver enhancement. no cbd dilation.      - fu hep serology- Hep B negative.   - fu MR ABD   - continue to monitor LFTs
Elevated alk phos, normal AST/ALT. CT abd with heterogenous liver enhancement. no cbd dilation.      - fu hep serology- Hep B negative.   - fu MR ABD   - monitor LFTs
Elevated alk phos, normal AST/ALT. CT abd with heterogenous liver enhancement. no cbd dilation.      - fu hep serology negative  - continue to monitor LFTs, decreased today  - MR suggestive of acute hepatitis; hepatology following
Elevated alk phos, normal AST/ALT. CT abd with heterogenous liver enhancement. no cbd dilation.      - fu hep serology- pending   - fu MR ABD   - monitor LFTs
Elevated alk phos, normal AST/ALT. CT abd with heterogenous liver enhancement. no cbd dilation.      - fu hep serology- Hep B negative.   - fu MR ABD   - continue to monitor LFTs

## 2023-06-22 NOTE — PROGRESS NOTE ADULT - PROBLEM SELECTOR PLAN 1
pw RLQ abdominal pain with diarrhea for a month. CT abd/pelvis with Pancolitis. Wall thickening of the terminal ileum, which may be reactive or reflective of terminal ileitis.  - GI PCR negative, c.diff negative.  fecal calprotein pending   - CRP elevated, hep B negative   - GI consulted, colonoscopy suggestive of Crohn's with "Perianal skin tags found on perianal exam; Patchy moderate inflammation found in the entire examined colon, likely secondary to Crohn's disease"  - increase pain control to Morphine 3mg q4hr prn  -continue steroids, 20mg q8 of solumedrol for one more day; transition to po steroids tomorrow, confirm with GI duration   -s/p remicade infusion

## 2023-06-22 NOTE — CHART NOTE - NSCHARTNOTEFT_GEN_A_CORE
Patient had mildly elevated alk phos and ALT with normal bili on admission, MRI abd found to have enlarged liver w/ 4 mm cyst in the right hepatic lobe. Hep A/B/C panel neg. Less concerned for hepatitis at this time w/ improving liver enzymes, not hepatocellular pattern.     Recommendations:   - Can consider fractionating alkaline phosphatase isoenzymes.   - Please obtain GGT level  - please send alpha-1 anti-trypsin (phenotype), ceruloplasmin, AFP  - please send LUIS ARMANDO, anti-mitochondrial antibody, anti-smooth muscle antibody, anti-liver kidney antibody, immunoglobulins (IgG, IgM, IgA quantitative) for autoimmune etiologies   - No further work up necessary as inpatient, patient will ultimately need to follow up in hepatology clinic within 4 weeks of hospital discharge.     Discussed the case with Dr. Franco.     Lazaro Domínguez, PGY-4  Gastroenterology/Hepatology Fellow  Available on [employer] Teams  [phone] (Short Range Pager)  [phone] (Long Range Pager)    After 5pm, please contact the on-call GI fellow. [phone]

## 2023-06-22 NOTE — DIETITIAN INITIAL EVALUATION ADULT - NS FNS WEIGHT CHANGE REASON
Pt states she tracks her weight frequently in the gym. Last time weighted herself on 6/8/15-160lbs. Most recent adm weight obtained 6/.4lbs. ?accuracy of adm weight possibly due to scale error. Pt endorses weight loss over the past 1 month. Obtained pt's weight via bed scale today 6/22-143lbs./unintentional

## 2023-06-22 NOTE — DISCHARGE NOTE PROVIDER - CARE PROVIDER_API CALL
Rafat Crump  Gastroenterology  [address] [place] [id number]  Phone: [phone]  Fax: [phone]  Follow Up Time:

## 2023-06-22 NOTE — DIETITIAN INITIAL EVALUATION ADULT - PERTINENT LABORATORY DATA
06-22    138  |  101  |  14  ----------------------------<  110<H>  4.2   |  22  |  0.60    Ca    9.3      22 Jun 2023 07:21  Phos  3.8     06-22  Mg     2.00     06-22    TPro  6.7  /  Alb  3.4  /  TBili  <0.2  /  DBili  x   /  AST  19  /  ALT  66<H>  /  AlkPhos  232<H>  06-22

## 2023-06-22 NOTE — PROGRESS NOTE ADULT - PROBLEM SELECTOR PROBLEM 2
Crohn's disease

## 2023-06-22 NOTE — DIETITIAN INITIAL EVALUATION ADULT - ORAL INTAKE PTA/DIET HISTORY
Patient reported she was following a balance diet until recently in May when she began eating-out more frequently and becoming "lazy" with her diet. Pt states that she has experienced diarrhea and decreased PO intake for the past month. Pt recently started eating more bland foods as a result her diarrhea has slightly improved as per pt. Pt states she exercises regularly and drinks Her[employer] Life protein shakes on a daily basis. However, pt states she's unable to tolerate protein shake over the past 1month due to diarrhea and abdominal pain.

## 2023-06-22 NOTE — DISCHARGE NOTE PROVIDER - NSDCMRMEDTOKEN_GEN_ALL_CORE_FT
pantoprazole 40 mg oral delayed release tablet: 1 tab(s) orally once a day (before a meal)  predniSONE 20 mg oral tablet: 2 tab(s) orally once a day

## 2023-06-22 NOTE — PROGRESS NOTE ADULT - PROBLEM SELECTOR PROBLEM 3
Elevated alkaline phosphatase level

## 2023-06-22 NOTE — DISCHARGE NOTE NURSING/CASE MANAGEMENT/SOCIAL WORK - PATIENT PORTAL LINK FT
You can access the FollowMyHealth Patient Portal offered by [facility] by registering at the following website: [email or web address]. By joining [facility] FollowMyHealth portal, you will also be able to view your health information using other applications (apps) compatible with our system.

## 2023-06-23 ENCOUNTER — NON-APPOINTMENT (OUTPATIENT)
Age: 23
End: 2023-06-23

## 2023-06-23 LAB — ANA TITR SER: NEGATIVE — SIGNIFICANT CHANGE UP

## 2023-06-24 LAB
CULTURE RESULTS: SIGNIFICANT CHANGE UP
CULTURE RESULTS: SIGNIFICANT CHANGE UP
SPECIMEN SOURCE: SIGNIFICANT CHANGE UP
SPECIMEN SOURCE: SIGNIFICANT CHANGE UP

## 2023-06-26 LAB — CALPROTECTIN STL-MCNT: 6920 UG/G — HIGH (ref 0–120)

## 2023-06-30 RX ORDER — INFLIXIMAB 100 MG/10ML
100 INJECTION, POWDER, LYOPHILIZED, FOR SOLUTION INTRAVENOUS
Qty: 1 | Refills: 0 | Status: ACTIVE | OUTPATIENT
Start: 2023-06-30 | End: 1900-01-01

## 2023-06-30 RX ORDER — INFLIXIMAB 100 MG/10ML
100 INJECTION, POWDER, LYOPHILIZED, FOR SOLUTION INTRAVENOUS
Qty: 1 | Refills: 0 | Status: ACTIVE | COMMUNITY
Start: 2023-06-22

## 2023-07-03 ENCOUNTER — APPOINTMENT (OUTPATIENT)
Dept: RHEUMATOLOGY | Facility: CLINIC | Age: 23
End: 2023-07-03
Payer: COMMERCIAL

## 2023-07-03 VITALS
HEIGHT: 66 IN | SYSTOLIC BLOOD PRESSURE: 112 MMHG | HEART RATE: 98 BPM | TEMPERATURE: 97.1 F | BODY MASS INDEX: 25.07 KG/M2 | DIASTOLIC BLOOD PRESSURE: 70 MMHG | OXYGEN SATURATION: 98 % | WEIGHT: 156 LBS | RESPIRATION RATE: 16 BRPM

## 2023-07-03 VITALS
DIASTOLIC BLOOD PRESSURE: 68 MMHG | RESPIRATION RATE: 16 BRPM | OXYGEN SATURATION: 98 % | HEART RATE: 82 BPM | SYSTOLIC BLOOD PRESSURE: 98 MMHG

## 2023-07-03 PROCEDURE — 96415 CHEMO IV INFUSION ADDL HR: CPT

## 2023-07-03 PROCEDURE — 96413 CHEMO IV INFUSION 1 HR: CPT

## 2023-07-03 RX ORDER — INFLIXIMAB 100 MG/10ML
100 INJECTION, POWDER, LYOPHILIZED, FOR SOLUTION INTRAVENOUS
Qty: 1 | Refills: 0 | Status: COMPLETED
Start: 2023-06-30

## 2023-07-03 NOTE — HISTORY OF PRESENT ILLNESS
[Denies] : Denies [No] : No [Yes] : Yes [Informed consent documented in EHR.] : Informed consent documented in EHR. [Left upper extremity] : Left upper extremity [24g] : 24g [Start Time: ___] : Medication Start Time: [unfilled] [End Time: ___] : Medication End Time: [unfilled] [IV discontinued. Intact. No signs or symptoms of IV complications noted. Time: ___] : IV discontinued. Intact. No signs or symptoms of IV complications noted. Time: [unfilled] [Patient  instructed to seek medical attention with signs and symptoms of adverse effects] : Patient  instructed to seek medical attention with signs and symptoms of adverse effects [Patient left unit in no acute distress] : Patient left unit in no acute distress [Medications administered as ordered and tolerated well.] : Medications administered as ordered and tolerated well. [de-identified] : Patient is new patient here for Remicade infusion. Patient states that she had colonoscopy in the hospital a month ago d/t diarrhea and colitis. After colonoscopy patient was diagnosed with Crohns and she received 1 dose of Remicade infusion in the hospital. Patient states she tolerated infusion well. Today, patient reports she is doing well, she denies any abx use/ recent infection. She states that she is currently on prednisone 20mg daily. She reports to have 2x formed bm's daily without blood/ no mucus. She denies any abd pain/N/V/ appetite loss. Safety instructions discussed with patient. Medication education also provided with s/s of infusion reaction. Consent done with MD. In conclusion, patient tolerated infusion well. Discharged to home in NAD.

## 2024-02-14 ENCOUNTER — INPATIENT (INPATIENT)
Facility: HOSPITAL | Age: 24
LOS: 6 days | Discharge: HOME | End: 2024-02-21
Attending: STUDENT IN AN ORGANIZED HEALTH CARE EDUCATION/TRAINING PROGRAM | Admitting: STUDENT IN AN ORGANIZED HEALTH CARE EDUCATION/TRAINING PROGRAM
Payer: COMMERCIAL

## 2024-02-14 VITALS
HEART RATE: 82 BPM | TEMPERATURE: 98 F | OXYGEN SATURATION: 98 % | DIASTOLIC BLOOD PRESSURE: 73 MMHG | RESPIRATION RATE: 16 BRPM | SYSTOLIC BLOOD PRESSURE: 112 MMHG

## 2024-02-14 DIAGNOSIS — K50.90 CROHN'S DISEASE, UNSPECIFIED, WITHOUT COMPLICATIONS: ICD-10-CM

## 2024-02-14 LAB
ALBUMIN SERPL ELPH-MCNC: 4.2 G/DL — SIGNIFICANT CHANGE UP (ref 3.3–5)
ALP SERPL-CCNC: 164 U/L — HIGH (ref 40–120)
ALT FLD-CCNC: 45 U/L — HIGH (ref 4–33)
ANION GAP SERPL CALC-SCNC: 14 MMOL/L — SIGNIFICANT CHANGE UP (ref 7–14)
AST SERPL-CCNC: 28 U/L — SIGNIFICANT CHANGE UP (ref 4–32)
BASOPHILS # BLD AUTO: 0.15 K/UL — SIGNIFICANT CHANGE UP (ref 0–0.2)
BASOPHILS NFR BLD AUTO: 0.9 % — SIGNIFICANT CHANGE UP (ref 0–2)
BILIRUB SERPL-MCNC: <0.2 MG/DL — SIGNIFICANT CHANGE UP (ref 0.2–1.2)
BLOOD GAS VENOUS COMPREHENSIVE RESULT: SIGNIFICANT CHANGE UP
BUN SERPL-MCNC: 7 MG/DL — SIGNIFICANT CHANGE UP (ref 7–23)
CALCIUM SERPL-MCNC: 9.7 MG/DL — SIGNIFICANT CHANGE UP (ref 8.4–10.5)
CHLORIDE SERPL-SCNC: 100 MMOL/L — SIGNIFICANT CHANGE UP (ref 98–107)
CO2 SERPL-SCNC: 24 MMOL/L — SIGNIFICANT CHANGE UP (ref 22–31)
CREAT SERPL-MCNC: 0.77 MG/DL — SIGNIFICANT CHANGE UP (ref 0.5–1.3)
CRP SERPL-MCNC: 85.8 MG/L — HIGH
EGFR: 110 ML/MIN/1.73M2 — SIGNIFICANT CHANGE UP
EOSINOPHIL # BLD AUTO: 0 K/UL — SIGNIFICANT CHANGE UP (ref 0–0.5)
EOSINOPHIL NFR BLD AUTO: 0 % — SIGNIFICANT CHANGE UP (ref 0–6)
ERYTHROCYTE [SEDIMENTATION RATE] IN BLOOD: 38 MM/HR — HIGH (ref 4–25)
GLUCOSE SERPL-MCNC: 88 MG/DL — SIGNIFICANT CHANGE UP (ref 70–99)
HCG SERPL-ACNC: <1 MIU/ML — SIGNIFICANT CHANGE UP
HCT VFR BLD CALC: 37.8 % — SIGNIFICANT CHANGE UP (ref 34.5–45)
HGB BLD-MCNC: 12.6 G/DL — SIGNIFICANT CHANGE UP (ref 11.5–15.5)
IANC: 11.4 K/UL — HIGH (ref 1.8–7.4)
LIDOCAIN IGE QN: 48 U/L — SIGNIFICANT CHANGE UP (ref 7–60)
LYMPHOCYTES # BLD AUTO: 1.88 K/UL — SIGNIFICANT CHANGE UP (ref 1–3.3)
LYMPHOCYTES # BLD AUTO: 11.5 % — LOW (ref 13–44)
MCHC RBC-ENTMCNC: 29.8 PG — SIGNIFICANT CHANGE UP (ref 27–34)
MCHC RBC-ENTMCNC: 33.3 GM/DL — SIGNIFICANT CHANGE UP (ref 32–36)
MCV RBC AUTO: 89.4 FL — SIGNIFICANT CHANGE UP (ref 80–100)
MONOCYTES # BLD AUTO: 0.87 K/UL — SIGNIFICANT CHANGE UP (ref 0–0.9)
MONOCYTES NFR BLD AUTO: 5.3 % — SIGNIFICANT CHANGE UP (ref 2–14)
NEUTROPHILS # BLD AUTO: 12.33 K/UL — HIGH (ref 1.8–7.4)
NEUTROPHILS NFR BLD AUTO: 71.7 % — SIGNIFICANT CHANGE UP (ref 43–77)
PLATELET # BLD AUTO: 374 K/UL — SIGNIFICANT CHANGE UP (ref 150–400)
POTASSIUM SERPL-MCNC: 4 MMOL/L — SIGNIFICANT CHANGE UP (ref 3.5–5.3)
POTASSIUM SERPL-SCNC: 4 MMOL/L — SIGNIFICANT CHANGE UP (ref 3.5–5.3)
PROT SERPL-MCNC: 8 G/DL — SIGNIFICANT CHANGE UP (ref 6–8.3)
RBC # BLD: 4.23 M/UL — SIGNIFICANT CHANGE UP (ref 3.8–5.2)
RBC # FLD: 13.4 % — SIGNIFICANT CHANGE UP (ref 10.3–14.5)
SODIUM SERPL-SCNC: 138 MMOL/L — SIGNIFICANT CHANGE UP (ref 135–145)
WBC # BLD: 16.39 K/UL — HIGH (ref 3.8–10.5)
WBC # FLD AUTO: 16.39 K/UL — HIGH (ref 3.8–10.5)

## 2024-02-14 PROCEDURE — 74177 CT ABD & PELVIS W/CONTRAST: CPT | Mod: 26,MA

## 2024-02-14 PROCEDURE — 99223 1ST HOSP IP/OBS HIGH 75: CPT

## 2024-02-14 PROCEDURE — 99285 EMERGENCY DEPT VISIT HI MDM: CPT

## 2024-02-14 RX ORDER — MORPHINE SULFATE 50 MG/1
4 CAPSULE, EXTENDED RELEASE ORAL ONCE
Refills: 0 | Status: DISCONTINUED | OUTPATIENT
Start: 2024-02-14 | End: 2024-02-14

## 2024-02-14 RX ORDER — KETOROLAC TROMETHAMINE 30 MG/ML
30 SYRINGE (ML) INJECTION ONCE
Refills: 0 | Status: DISCONTINUED | OUTPATIENT
Start: 2024-02-14 | End: 2024-02-14

## 2024-02-14 RX ORDER — MORPHINE SULFATE 50 MG/1
3 CAPSULE, EXTENDED RELEASE ORAL EVERY 6 HOURS
Refills: 0 | Status: DISCONTINUED | OUTPATIENT
Start: 2024-02-14 | End: 2024-02-15

## 2024-02-14 RX ORDER — SODIUM CHLORIDE 9 MG/ML
1000 INJECTION INTRAMUSCULAR; INTRAVENOUS; SUBCUTANEOUS ONCE
Refills: 0 | Status: COMPLETED | OUTPATIENT
Start: 2024-02-14 | End: 2024-02-14

## 2024-02-14 RX ORDER — MORPHINE SULFATE 50 MG/1
2 CAPSULE, EXTENDED RELEASE ORAL EVERY 4 HOURS
Refills: 0 | Status: DISCONTINUED | OUTPATIENT
Start: 2024-02-14 | End: 2024-02-15

## 2024-02-14 RX ORDER — SODIUM CHLORIDE 9 MG/ML
1000 INJECTION, SOLUTION INTRAVENOUS
Refills: 0 | Status: DISCONTINUED | OUTPATIENT
Start: 2024-02-14 | End: 2024-02-19

## 2024-02-14 RX ADMIN — MORPHINE SULFATE 4 MILLIGRAM(S): 50 CAPSULE, EXTENDED RELEASE ORAL at 22:02

## 2024-02-14 RX ADMIN — SODIUM CHLORIDE 1000 MILLILITER(S): 9 INJECTION INTRAMUSCULAR; INTRAVENOUS; SUBCUTANEOUS at 19:17

## 2024-02-14 RX ADMIN — MORPHINE SULFATE 4 MILLIGRAM(S): 50 CAPSULE, EXTENDED RELEASE ORAL at 19:17

## 2024-02-14 NOTE — H&P ADULT - NSHPLABSRESULTS_GEN_ALL_CORE
.    -Personally INTERPRETED EKG:    --Personally reviewed the following labs below:                        12.6   16.39 )-----------( 374      ( 14 Feb 2024 19:57 )             37.8   02-14    138  |  100  |  7   ----------------------------<  88  4.0   |  24  |  0.77    Ca    9.7      14 Feb 2024 19:57    TPro  8.0  /  Alb  4.2  /  TBili  <0.2  /  DBili  x   /  AST  28  /  ALT  45<H>  /  AlkPhos  164<H>  02-14        -Personally reviewed: CT ABDOMEN AND PELVIS IC   ORDERED BY: ADY BLACK     PROCEDURE DATE:  02/14/2024    INTERPRETATION:  CLINICAL INFORMATION: Abdominal pain. Crohn's disease  FINDINGS:  LOWER CHEST: Within normal limits.  LIVER: Hepatomegaly approximately measures 21 cm. Mild focal fat   deposition adjacent to the falciform ligament.  Mild periportal edema  BILE DUCTS: Normal caliber.  GALLBLADDER: Decompressed  SPLEEN: Within normal limits.  PANCREAS: Within normal limits.  ADRENALS: Within normal limits.  KIDNEYS/URETERS: Symmetric renal enhancement without hydronephrosis.   Partial duplication of the left collecting system.  Few subcentimeter hypodensities too small for definitive characterization   by CT  BLADDER: Minimally distended.  REPRODUCTIVE ORGANS: Rim enhancing mildly crenulated 2.7 cm likely   involuting hemorrhagic cyst within right ovary  BOWEL: No bowel obstruction. Terminal ileal wall thickening compatible   with acute Crohn's ileitis.  Liquid density stool with air fluid levels throughout colon in keeping   with diarrheal illness.  Colonic wall thickening with hyperenhancement involving the left half   transverse colon, splenic flexure, descending and rectosigmoid colon in   keeping with acute proctocolitis  PERITONEUM: Trace volume pelvic fluid, within physiologic limits.  VESSELS: Within normal limits.  RETROPERITONEUM/LYMPH NODES: No lymphadenopathy.  ABDOMINAL WALL: No significant acute abnormality.  BONES: No significant acute bony abnormality.    IMPRESSION:  Acute Crohn's terminal ileitis and proctocolitis as described .    -Personally INTERPRETED EKG:    --Personally reviewed the following labs below:             19:57 - VBG - pH: 7.41  | pCO2: 45    | pO2: 23    | Lactate: 1.6                   12.6   16.39 )-----------( 374      ( 14 Feb 2024 19:57 )             37.8   02-14    138  |  100  |  7   ----------------------------<  88  4.0   |  24  |  0.77    Ca    9.7      14 Feb 2024 19:57    TPro  8.0  /  Alb  4.2  /  TBili  <0.2  /  DBili  x   /  AST  28  /  ALT  45<H>  /  AlkPhos  164<H>  02-14        -Personally reviewed: CT ABDOMEN AND PELVIS IC   ORDERED BY: ADY BLACK     PROCEDURE DATE:  02/14/2024    INTERPRETATION:  CLINICAL INFORMATION: Abdominal pain. Crohn's disease  FINDINGS:  LOWER CHEST: Within normal limits.  LIVER: Hepatomegaly approximately measures 21 cm. Mild focal fat   deposition adjacent to the falciform ligament.  Mild periportal edema  BILE DUCTS: Normal caliber.  GALLBLADDER: Decompressed  SPLEEN: Within normal limits.  PANCREAS: Within normal limits.  ADRENALS: Within normal limits.  KIDNEYS/URETERS: Symmetric renal enhancement without hydronephrosis.   Partial duplication of the left collecting system.  Few subcentimeter hypodensities too small for definitive characterization   by CT  BLADDER: Minimally distended.  REPRODUCTIVE ORGANS: Rim enhancing mildly crenulated 2.7 cm likely   involuting hemorrhagic cyst within right ovary  BOWEL: No bowel obstruction. Terminal ileal wall thickening compatible   with acute Crohn's ileitis.  Liquid density stool with air fluid levels throughout colon in keeping   with diarrheal illness.  Colonic wall thickening with hyperenhancement involving the left half   transverse colon, splenic flexure, descending and rectosigmoid colon in   keeping with acute proctocolitis  PERITONEUM: Trace volume pelvic fluid, within physiologic limits.  VESSELS: Within normal limits.  RETROPERITONEUM/LYMPH NODES: No lymphadenopathy.  ABDOMINAL WALL: No significant acute abnormality.  BONES: No significant acute bony abnormality.    IMPRESSION:  Acute Crohn's terminal ileitis and proctocolitis as described

## 2024-02-14 NOTE — H&P ADULT - ASSESSMENT
23-year-old female with FHX of Crohn's (sister)  personal  history of Crohn's disease, diagnosed in 2023 a/w likely acute Crohn's flare requiring Morphine IV for pain control. Found to have hepatomegaly and likely hemorrhagic cyst within right ovary.

## 2024-02-14 NOTE — ED PROVIDER NOTE - CLINICAL SUMMARY MEDICAL DECISION MAKING FREE TEXT BOX
23-year-old female with Crohn's disease presenting with signs and symptoms consistent with an acute Crohn's flare.  The patient is currently reporting pain that will be addressed with appropriate medication & therapeutics and will require reassessment.  CT of the abdomen pelvis will be obtained to assess for intra-abdominal abscesses.  Will also obtain labs to assess for electrolyte abnormalities.

## 2024-02-14 NOTE — ED PROVIDER NOTE - PROGRESS NOTE DETAILS
TEMITOPE Knutson: Patient received at signout pending labs and CT.  Upon reassessment patient recently received morphine and is still endorsing abdominal pain, abdomen is diffusely tender especially to the lower abdomen.  Will continue to monitor.

## 2024-02-14 NOTE — ED PROVIDER NOTE - PHYSICAL EXAMINATION
T(F): 98.1; HR: 82; BP: 112/73; RR: 16; SpO2: 98%    General: in obvious discomfort  Psych: mood appropriate  Head: normocephalic; atraumatic  Eyes: conjunctivae clear bilaterally, sclerae anicteric  ENT: no nasal flaring, patent nares  Cardio: non-tachycardic; skin warm and well perfused  Resp: normal respiratory effort; no accessory muscle use  GI: diffuse tenderness to palpation  Neuro: normal sensation, moving all four extremities equally  Skin: No evidence of rash or bruising  MSK: normal movement of all extremities

## 2024-02-14 NOTE — ED PROVIDER NOTE - ATTENDING CONTRIBUTION TO CARE
Dr. Martinez:  I have personally performed a face to face bedside history and physical examination of this patient. I have discussed the history, examination, review of systems, assessment and plan of management with the resident. I have reviewed the electronic medical record and amended it to reflect my history, review of systems, physical exam, assessment and plan.    23F h/o Crohn's disease sent to ED by her gastroenterologist for further evaluation of Crohn's flare.  Pt c/o 1 week of worsening abdominal pain and diarrhea with bloody/mucus-y stools.  Feels similar to previous exacerbation.  Pt is on Remicade infusion q8wks, and was tapered off prednisone a few weeks ago.  Since her symptoms started, she was restarted on prednisone 40mg 3 days prior with no improvement in symptoms, so was instructed to come ot ED.    Exam:  - appears uncomfortable  - rrr  - ctab  - abd soft, diffusely TTP    A/P  - abd pain, likely crohn's flare, eval for complications such as abscess, etc  - cbc, cmp, esr, c diff, CT abd/pelvis, hcg

## 2024-02-14 NOTE — ED PROVIDER NOTE - CARE PLAN
Principal Discharge DX:	Acute abdominal pain   1 Principal Discharge DX:	Exacerbation of Crohn's disease

## 2024-02-14 NOTE — ED ADULT NURSE NOTE - PRIMARY CARE PROVIDER
-- DO NOT REPLY / DO NOT REPLY ALL --  -- Message is from the [facility]--    Transfer to RN      Chief Complaint: Patient took 2 home covid test & tested positive. Patient has chills, shaking & is sweating.    Caller Information       Type Contact Phone    08/17/2021 06:45 PM CDT Phone (Incoming) Schuyler Renteria (Self) [phone] (S)          Provider Name:  Not a [facility] patient    [facility] Site Name:      Alternative Phone Number:  [phone]       
unknown

## 2024-02-14 NOTE — ED ADULT TRIAGE NOTE - CHIEF COMPLAINT QUOTE
Pt c/o abdominal pain x 5 days. +diarrhea and nausea. +blood and mucus in stool. Has hx of Crohn's, states consistent with flare-ups. Denies fevers/chills, vomiting. PMHx Crohn's Disease

## 2024-02-14 NOTE — ED ADULT NURSE NOTE - NSFALLUNIVINTERV_ED_ALL_ED
Bed/Stretcher in lowest position, wheels locked, appropriate side rails in place/Call bell, personal items and telephone in reach/Instruct patient to call for assistance before getting out of bed/chair/stretcher/Non-slip footwear applied when patient is off stretcher/[place] to call system/Physically safe environment - no spills, clutter or unnecessary equipment/Purposeful proactive rounding/Room/bathroom lighting operational, light cord in reach

## 2024-02-14 NOTE — H&P ADULT - HISTORY OF PRESENT ILLNESS
23-year-old female with  medical history of Crohn's disease on Remicade infusions every 8 weeks, c/o 5 days of severe diffuse abdominal pain associated with many episodes, daily of blood and mucus streaked stool.  She was previously weaned off of her long-term prednisone a month ago, was restarted on Monday with onset of the symptoms. Reports no n/v, fever, chills, SOB, CP, urinary symptoms. No GI symptoms among family member, no recent travel.  23-year-old female with  medical history of Crohn's disease on Remicade infusions every 8 weeks (next in March 2024), c/o 5 days of severe diffuse abdominal pain associated with many episodes, daily of blood and mucus streaked stool.  She was previously weaned off of her long-term prednisone a month ago, was restarted on Monday with onset of the symptoms. Reports no n/v, fever, chills, SOB, CP, urinary symptoms. No GI symptoms among family member, no recent travel. Last time on Prednisone in January 2024.

## 2024-02-14 NOTE — H&P ADULT - NSICDXFAMILYHX_GEN_ALL_CORE_FT
FAMILY HISTORY:  Father  Still living? Unknown  FH: diabetes mellitus, Age at diagnosis: Age Unknown    Sibling  Still living? Unknown  FH: Crohn's disease, Age at diagnosis: Age Unknown

## 2024-02-14 NOTE — H&P ADULT - PROBLEM SELECTOR PLAN 3
Rim enhancing mildly crenulated 2.7 cm likely involuting hemorrhagic cyst within right ovary Rim enhancing mildly crenulated 2.7 cm likely involuting hemorrhagic cyst within right ovary    -Observe  -Pain control   -Monitor Hgb  -Hold Heparin for DVT ppx

## 2024-02-14 NOTE — H&P ADULT - NSHPPHYSICALEXAM_GEN_ALL_CORE
Vital Signs Last 24 Hrs  T(C): 36.2 (14 Feb 2024 22:08), Max: 36.7 (14 Feb 2024 16:29)  T(F): 97.2 (14 Feb 2024 22:08), Max: 98.1 (14 Feb 2024 16:29)  HR: 79 (14 Feb 2024 22:08) (79 - 82)  BP: 117/68 (14 Feb 2024 22:08) (112/73 - 117/68)  BP(mean): 79 (14 Feb 2024 22:08) (79 - 79)  RR: 17 (14 Feb 2024 22:08) (16 - 17)  SpO2: 100% (14 Feb 2024 22:08) (98% - 100%)    Parameters below as of 14 Feb 2024 22:08  Patient On (Oxygen Delivery Method): room air

## 2024-02-14 NOTE — H&P ADULT - PROBLEM SELECTOR PLAN 2
CT A/P: Hepatomegaly approximately measures 21 cm. Mild focal fat deposition adjacent to the falciform ligament. Mild periportal edema.    -GI c/s as above CT A/P: Hepatomegaly approximately measures 21 cm. Mild focal fat deposition adjacent to the falciform ligament. Mild periportal edema.  Elevated ALPH and ALT     -GI/Hepatology c/s

## 2024-02-14 NOTE — H&P ADULT - PROBLEM SELECTOR PLAN 1
CT A/P: Terminal ileal wall thickening compatible with acute Crohn's ileitis. Liquid density stool with air fluid levels throughout colon in keeping with diarrheal illness. Colonic wall thickening with hyperenhancement involving the left half   transverse colon, splenic flexure, descending and rectosigmoid colon in keeping with acute proctocolitis  WBC=16K  ESR=38  CRP=85  High suspicion for Crohn's flare;    -f/u C diff, GI PCR  -Morphine IVP PRN  -IV hydration  -Starter empiric Solumedrol 20mg IVP q8h x 3 doses, pending further GI recs  -Formal GI c/s requested   -Monitor for fever, Hold Abx for now   -VS q4h

## 2024-02-14 NOTE — H&P ADULT - MUSCULOSKELETAL
no joint swelling/no joint erythema/no joint warmth/strength 5/5 bilateral upper extremities/strength 5/5 bilateral lower extremities details…

## 2024-02-14 NOTE — H&P ADULT - TIME BILLING
Multiple acute findings;  Reviewed admission imaging reports, and admission labs prior to the encounter with  the patient   Reviewed Triage and ED course/ documentation   Reviewed consultants notes, including::: GI notes dated 6/22/2023  Performed full physical exam and ROS  Obtained list of home medications and performed home medications reconciliation on EMR  Discussed prognosis, treatment and further GI w/up with the patient and the family member including::: father at bedside  Ordered or reviewed new admission medications and placed or reviewed all hospitalization admission orders  Managed (continued, held or adjusted doses) home medications   Ordered  or reviewed orders of  new imaging and new lab w/up  Requested new consultations including::: GI, Hepatology

## 2024-02-14 NOTE — ED PROVIDER NOTE - OBJECTIVE STATEMENT
23-year-old female with past medical history of Crohn's disease on Remicade infusions every 8 weeks, presents to the emergency department due to multiple days of severe abdominal pain associated with many episodes each day of blood and mucus streaked stool.  She was previously weaned off of her long-term prednisone a month ago, was restarted on Monday with onset of the symptoms.  She denies any fevers or chills.  No urinary symptoms.

## 2024-02-15 DIAGNOSIS — N83.209 UNSPECIFIED OVARIAN CYST, UNSPECIFIED SIDE: ICD-10-CM

## 2024-02-15 DIAGNOSIS — R16.0 HEPATOMEGALY, NOT ELSEWHERE CLASSIFIED: ICD-10-CM

## 2024-02-15 DIAGNOSIS — Z29.9 ENCOUNTER FOR PROPHYLACTIC MEASURES, UNSPECIFIED: ICD-10-CM

## 2024-02-15 DIAGNOSIS — K50.90 CROHN'S DISEASE, UNSPECIFIED, WITHOUT COMPLICATIONS: ICD-10-CM

## 2024-02-15 LAB
ALBUMIN SERPL ELPH-MCNC: 3.3 G/DL — SIGNIFICANT CHANGE UP (ref 3.3–5)
ALP SERPL-CCNC: 135 U/L — HIGH (ref 40–120)
ALT FLD-CCNC: 37 U/L — HIGH (ref 4–33)
ANION GAP SERPL CALC-SCNC: 12 MMOL/L — SIGNIFICANT CHANGE UP (ref 7–14)
AST SERPL-CCNC: 23 U/L — SIGNIFICANT CHANGE UP (ref 4–32)
BASOPHILS # BLD AUTO: 0.05 K/UL — SIGNIFICANT CHANGE UP (ref 0–0.2)
BASOPHILS NFR BLD AUTO: 0.3 % — SIGNIFICANT CHANGE UP (ref 0–2)
BILIRUB SERPL-MCNC: <0.2 MG/DL — SIGNIFICANT CHANGE UP (ref 0.2–1.2)
BUN SERPL-MCNC: 6 MG/DL — LOW (ref 7–23)
C DIFF BY PCR RESULT: SIGNIFICANT CHANGE UP
CALCIUM SERPL-MCNC: 8.7 MG/DL — SIGNIFICANT CHANGE UP (ref 8.4–10.5)
CHLORIDE SERPL-SCNC: 101 MMOL/L — SIGNIFICANT CHANGE UP (ref 98–107)
CO2 SERPL-SCNC: 23 MMOL/L — SIGNIFICANT CHANGE UP (ref 22–31)
CREAT SERPL-MCNC: 0.81 MG/DL — SIGNIFICANT CHANGE UP (ref 0.5–1.3)
EGFR: 103 ML/MIN/1.73M2 — SIGNIFICANT CHANGE UP
EOSINOPHIL # BLD AUTO: 0 K/UL — SIGNIFICANT CHANGE UP (ref 0–0.5)
EOSINOPHIL NFR BLD AUTO: 0 % — SIGNIFICANT CHANGE UP (ref 0–6)
GI PCR PANEL: SIGNIFICANT CHANGE UP
GLUCOSE SERPL-MCNC: 116 MG/DL — HIGH (ref 70–99)
HCT VFR BLD CALC: 34.1 % — LOW (ref 34.5–45)
HGB BLD-MCNC: 11.4 G/DL — LOW (ref 11.5–15.5)
IANC: 12.39 K/UL — HIGH (ref 1.8–7.4)
IMM GRANULOCYTES NFR BLD AUTO: 0.6 % — SIGNIFICANT CHANGE UP (ref 0–0.9)
LYMPHOCYTES # BLD AUTO: 1.36 K/UL — SIGNIFICANT CHANGE UP (ref 1–3.3)
LYMPHOCYTES # BLD AUTO: 8.5 % — LOW (ref 13–44)
MAGNESIUM SERPL-MCNC: 1.8 MG/DL — SIGNIFICANT CHANGE UP (ref 1.6–2.6)
MCHC RBC-ENTMCNC: 30.3 PG — SIGNIFICANT CHANGE UP (ref 27–34)
MCHC RBC-ENTMCNC: 33.4 GM/DL — SIGNIFICANT CHANGE UP (ref 32–36)
MCV RBC AUTO: 90.7 FL — SIGNIFICANT CHANGE UP (ref 80–100)
MONOCYTES # BLD AUTO: 2.05 K/UL — HIGH (ref 0–0.9)
MONOCYTES NFR BLD AUTO: 12.9 % — SIGNIFICANT CHANGE UP (ref 2–14)
NEUTROPHILS # BLD AUTO: 12.39 K/UL — HIGH (ref 1.8–7.4)
NEUTROPHILS NFR BLD AUTO: 77.7 % — HIGH (ref 43–77)
NRBC # BLD: 0 /100 WBCS — SIGNIFICANT CHANGE UP (ref 0–0)
NRBC # FLD: 0 K/UL — SIGNIFICANT CHANGE UP (ref 0–0)
PLATELET # BLD AUTO: 328 K/UL — SIGNIFICANT CHANGE UP (ref 150–400)
POTASSIUM SERPL-MCNC: 4 MMOL/L — SIGNIFICANT CHANGE UP (ref 3.5–5.3)
POTASSIUM SERPL-SCNC: 4 MMOL/L — SIGNIFICANT CHANGE UP (ref 3.5–5.3)
PROT SERPL-MCNC: 6.6 G/DL — SIGNIFICANT CHANGE UP (ref 6–8.3)
RBC # BLD: 3.76 M/UL — LOW (ref 3.8–5.2)
RBC # FLD: 13.4 % — SIGNIFICANT CHANGE UP (ref 10.3–14.5)
SODIUM SERPL-SCNC: 136 MMOL/L — SIGNIFICANT CHANGE UP (ref 135–145)
WBC # BLD: 15.94 K/UL — HIGH (ref 3.8–10.5)
WBC # FLD AUTO: 15.94 K/UL — HIGH (ref 3.8–10.5)

## 2024-02-15 PROCEDURE — 99222 1ST HOSP IP/OBS MODERATE 55: CPT | Mod: GC

## 2024-02-15 PROCEDURE — 99233 SBSQ HOSP IP/OBS HIGH 50: CPT

## 2024-02-15 RX ORDER — MORPHINE SULFATE 50 MG/1
2 CAPSULE, EXTENDED RELEASE ORAL
Refills: 0 | Status: DISCONTINUED | OUTPATIENT
Start: 2024-02-15 | End: 2024-02-16

## 2024-02-15 RX ORDER — LANOLIN ALCOHOL/MO/W.PET/CERES
3 CREAM (GRAM) TOPICAL AT BEDTIME
Refills: 0 | Status: DISCONTINUED | OUTPATIENT
Start: 2024-02-15 | End: 2024-02-21

## 2024-02-15 RX ORDER — ACETAMINOPHEN 500 MG
650 TABLET ORAL EVERY 6 HOURS
Refills: 0 | Status: DISCONTINUED | OUTPATIENT
Start: 2024-02-15 | End: 2024-02-21

## 2024-02-15 RX ORDER — MORPHINE SULFATE 50 MG/1
2 CAPSULE, EXTENDED RELEASE ORAL ONCE
Refills: 0 | Status: DISCONTINUED | OUTPATIENT
Start: 2024-02-15 | End: 2024-02-15

## 2024-02-15 RX ORDER — PANTOPRAZOLE SODIUM 20 MG/1
40 TABLET, DELAYED RELEASE ORAL
Refills: 0 | Status: DISCONTINUED | OUTPATIENT
Start: 2024-02-15 | End: 2024-02-21

## 2024-02-15 RX ORDER — SODIUM CHLORIDE 9 MG/ML
1000 INJECTION INTRAMUSCULAR; INTRAVENOUS; SUBCUTANEOUS
Refills: 0 | Status: DISCONTINUED | OUTPATIENT
Start: 2024-02-15 | End: 2024-02-19

## 2024-02-15 RX ORDER — ONDANSETRON 8 MG/1
4 TABLET, FILM COATED ORAL EVERY 8 HOURS
Refills: 0 | Status: DISCONTINUED | OUTPATIENT
Start: 2024-02-15 | End: 2024-02-21

## 2024-02-15 RX ORDER — ACETAMINOPHEN 500 MG
650 TABLET ORAL EVERY 6 HOURS
Refills: 0 | Status: DISCONTINUED | OUTPATIENT
Start: 2024-02-15 | End: 2024-02-16

## 2024-02-15 RX ORDER — INFLUENZA VIRUS VACCINE 15; 15; 15; 15 UG/.5ML; UG/.5ML; UG/.5ML; UG/.5ML
0.5 SUSPENSION INTRAMUSCULAR ONCE
Refills: 0 | Status: DISCONTINUED | OUTPATIENT
Start: 2024-02-15 | End: 2024-02-21

## 2024-02-15 RX ADMIN — Medication 20 MILLIGRAM(S): at 22:15

## 2024-02-15 RX ADMIN — Medication 20 MILLIGRAM(S): at 00:20

## 2024-02-15 RX ADMIN — MORPHINE SULFATE 2 MILLIGRAM(S): 50 CAPSULE, EXTENDED RELEASE ORAL at 19:17

## 2024-02-15 RX ADMIN — MORPHINE SULFATE 2 MILLIGRAM(S): 50 CAPSULE, EXTENDED RELEASE ORAL at 16:15

## 2024-02-15 RX ADMIN — Medication 650 MILLIGRAM(S): at 09:06

## 2024-02-15 RX ADMIN — Medication 20 MILLIGRAM(S): at 05:44

## 2024-02-15 RX ADMIN — MORPHINE SULFATE 2 MILLIGRAM(S): 50 CAPSULE, EXTENDED RELEASE ORAL at 22:30

## 2024-02-15 RX ADMIN — Medication 20 MILLIGRAM(S): at 14:15

## 2024-02-15 RX ADMIN — MORPHINE SULFATE 2 MILLIGRAM(S): 50 CAPSULE, EXTENDED RELEASE ORAL at 11:44

## 2024-02-15 RX ADMIN — PANTOPRAZOLE SODIUM 40 MILLIGRAM(S): 20 TABLET, DELAYED RELEASE ORAL at 11:44

## 2024-02-15 RX ADMIN — SODIUM CHLORIDE 100 MILLILITER(S): 9 INJECTION, SOLUTION INTRAVENOUS at 00:21

## 2024-02-15 RX ADMIN — MORPHINE SULFATE 2 MILLIGRAM(S): 50 CAPSULE, EXTENDED RELEASE ORAL at 22:15

## 2024-02-15 RX ADMIN — SODIUM CHLORIDE 80 MILLILITER(S): 9 INJECTION INTRAMUSCULAR; INTRAVENOUS; SUBCUTANEOUS at 17:49

## 2024-02-15 RX ADMIN — MORPHINE SULFATE 3 MILLIGRAM(S): 50 CAPSULE, EXTENDED RELEASE ORAL at 03:38

## 2024-02-15 RX ADMIN — Medication 650 MILLIGRAM(S): at 10:05

## 2024-02-15 RX ADMIN — MORPHINE SULFATE 2 MILLIGRAM(S): 50 CAPSULE, EXTENDED RELEASE ORAL at 07:24

## 2024-02-15 RX ADMIN — MORPHINE SULFATE 2 MILLIGRAM(S): 50 CAPSULE, EXTENDED RELEASE ORAL at 12:44

## 2024-02-15 RX ADMIN — MORPHINE SULFATE 2 MILLIGRAM(S): 50 CAPSULE, EXTENDED RELEASE ORAL at 19:32

## 2024-02-15 RX ADMIN — SODIUM CHLORIDE 80 MILLILITER(S): 9 INJECTION INTRAMUSCULAR; INTRAVENOUS; SUBCUTANEOUS at 11:43

## 2024-02-15 NOTE — CONSULT NOTE ADULT - ATTENDING COMMENTS
23F CD (right colon, TI) on remicade, p/w cramping abdominal pain and blood streaked diarrhea c/f flare. GI PCR and C diff neg. CTAP with "Terminal ileal wall thickening compatible with acute Crohn's ileitis.Colonic wall thickening with hyperenhancement involving the left half transverse colon, splenic flexure, descending and rectosigmoid colon in keeping with acute proctocolitis."     CBC with leukocytosis (likely steroid induced), mild anemia to 11s, normal plts. CMP grossly unremarkable apart from elevated alk phos and mildly elevated ALT, had unrevealing MRI/MRCP summer 2023. CRP 86 this admission.    Presentation most c/w acute CD flare.    Low residue diet as tolerated, methylpred 20 mg IV TID, VTE prophy, pain control per primary.    We will continue to follow.

## 2024-02-15 NOTE — ED ADULT NURSE REASSESSMENT NOTE - NS ED NURSE REASSESS COMMENT FT1
ED Provider Note        CHIEF COMPLAINT  Chief Complaint   Patient presents with   • Abdominal Pain     umbilical       HPI  Julisa Carrion is a 52 y.o. female with a history of diabetes mellitus, hypertension, hypercholesterolemia, gastroparesis, chronic abdominal pain who presents with complaints of upper abdominal pain, nausea, vomiting for the past 2 days. The patient says she has had multiple episodes of vomiting mainly of clear fluid and yellowish material. She has had no diarrhea. She has been having worsening pain to the upper abdomen. The patient has been seen multiple times in the department for similar symptoms, and this is about her 9th or 10th visit this year. She has had 6 CAT scans of the abdomen/pelvis since December 2016. She says this episode started 2 days ago and she has not been able to keep any food or fluids down since. She denies fever, sore throat, cough, congestion, any difficulty breathing.      REVIEW OF SYSTEMS  See HPI for further details. All other systems are negative.     PAST MEDICAL HISTORY  Past Medical History   Diagnosis Date   • Hypertension    • Diabetes    • High cholesterol 5/15/2011   • Staph skin infection    • Migraine    • Intestinal mass 2015       FAMILY HISTORY  Family History   Problem Relation Age of Onset   • Cancer Maternal Aunt      Lung cancer d/t smoking       SOCIAL HISTORY  Social History     Social History   • Marital Status: Single     Spouse Name: N/A   • Number of Children: N/A   • Years of Education: N/A     Social History Main Topics   • Smoking status: Former Smoker -- 1.00 packs/day for 20 years     Types: Cigarettes     Quit date: 01/01/1996   • Smokeless tobacco: Former User     Quit date: 06/05/1995   • Alcohol Use: No   • Drug Use: No      Comment: just prescribed meds   • Sexual Activity: Not Asked     Other Topics Concern   • None     Social History Narrative    No known exposure to asbestos, dyes, chemicals, or pesticides.  Patient does 
Pt lying in stretcher with family at bedside. Pt is A&Ox4, not in acute distress. Pt endorses pain to the lower abdomen. As per MD Martinez ordered and verified with her, administered morphine. Pt vitals as charted. Bed in lowest position, safety maintained.
"not work.  She has 3 children and many grand-children.  Has a significant other for about 20 years. Moved to [place] in .        SURGICAL HISTORY  Past Surgical History   Procedure Laterality Date   • Other abdominal surgery       cholecystectomy   • Gyn surgery        x 3,tubal ligation   • Other orthopedic surgery  6-     right wrist surgery   • Abdominal exploration       Lower intestine d/t mass - benign       CURRENT MEDICATIONS  Home Medications     Reviewed by Addi Gordon (Pharmacy Tech) on 17 at 2035  Med List Status: Complete    Medication Last Dose Status    aspirin EC (ECOTRIN) 81 MG Tablet Delayed Response 2017 Active    gabapentin (NEURONTIN) 800 MG tablet 2017 Active    Insulin Degludec (TRESIBA FLEXTOUCH) 100 UNIT/ML Solution Pen-injector unknown Active    liraglutide (VICTOZA) 18 MG/3ML Solution Pen-injector injection unknown Active    lisinopril (PRINIVIL) 5 MG TABS 2017 Active    morphine ER (MS CONTIN) 30 MG Tab CR tablet 2017 Active    omeprazole (PRILOSEC) 20 MG delayed-release capsule 2017 Active    oxycodone-acetaminophen (PERCOCET-10)  MG Tab unknown Active    pravastatin (PRAVACHOL) 20 MG TABS 2017 Active    promethazine (PHENERGAN) 25 MG Suppos unknown Active                ALLERGIES  No Known Allergies    PHYSICAL EXAM  VITAL SIGNS: /62 mmHg  Pulse 80  Temp(Src) 36.8 °C (98.2 °F)  Resp 17  Ht 1.651 m (5' 5\")  Wt 77.7 kg (171 lb 4.8 oz)  BMI 28.51 kg/m2  SpO2 96%  LMP 2009  Breastfeeding? No  Constitutional: Awake, alert, in no acute distress, Non-toxic appearance.   HENT: Atraumatic. Bilateral external ears normal, mucous membranes mildly dry, throat nonerythematous without exudates, nose is normal.  Eyes: PERRL, EOMI, conjunctiva moist, noninjected.  Neck: Nontender, Normal range of motion, No nuchal rigidity, No stridor.   Lymphatic: No lymphadenopathy noted.   Cardiovascular: Regular rate and rhythm, no "
patient AOX4 c/o abdominal pain. breathing even and unlabored. skin warm and d ry. ACP team notified, pending orders. IVF infusing well via left AC saline lock with 20 Angiocath. pending bed. admitted.
murmurs, rubs, gallops.  Thorax & Lungs:  Good breath sounds bilaterally, no wheezes, rales, or retractions.  No chest tenderness.  Abdomen: Bowel sounds normal, Soft, nondistended, there is mild tenderness in epigastrium, no tenderness in the right upper quadrant or to the lower abdomen, no rebound, guarding, masses.  Back: No CVA or spinal tenderness.  Extremities: Intact distal pulses, No edema, No tenderness.   Skin: Warm, Dry, No rashes.   Musculoskeletal: No joint swelling or tenderness.  Neurologic: Alert & oriented x 3, sensory and motor function normal. No focal deficits.   Psychiatric: Affect normal, Judgment normal, Mood normal.     EKG  Twelve-lead EKG shows a normal sinus rhythm, rate of 87, normal intervals, normal axis, left atrial hypertrophy, no acute ST wave elevations or ST depressions, no pathologic Q waves, no evidence of acute injury or ischemic pattern on my reading, in comparison to previous EKG from May 7, 2017, there are no acute changes.        COURSE & MEDICAL DECISION MAKING  Pertinent Labs & Imaging studies reviewed. (See chart for details)  The patient presents with the above complaints. Old chart was reviewed, and she has had multiple workups in the past for similar complaints. Her abdominal exam is benign with no peritoneal signs. IV was placed, she was given a bolus of normal saline. She was given Dilaudid, Haldol, and Zofran.  CBC shows a white count of 8100, hemoglobin 11.8, normal differential, chemistry shows normal electrolyte, BUN 23, creatinine 0.68, glucose 173, liver function tests normal, lipase normal. Findings were discussed with the patient. The patient was informed she had normal lab work, and was encouraged to try to drink something. She said she still felt very nauseous and declined to try to eat or drink anything, and requested additional pain medication, and felt she needed to be admitted to the hospital. The patient was told she had no acute medical condition 
requiring any further narcotic pain medication. Case was discussed with Dr. Fuentes for admission.    FINAL IMPRESSION  1. Intractable nausea, vomiting  2. Chronic epigastric abdominal pain  3. Narcotic dependence         Electronically signed by: Cruz Burgess, 6/1/2017 2:07 PM

## 2024-02-15 NOTE — CONSULT NOTE ADULT - REASON FOR ADMISSION
Abdominal pain x 5 days, diarrhea [de-identified] : Left Hip: Range of Motion in Degrees:\par 	                                 Claimant:	   Normal:	\par Flexion (Active) 	                 120 	   120-degrees	\par Flexion (Passive)	                 120	   120-degrees	\par Extension (Active)	                 -30	   -30-degrees	\par Extension (Passive)	 -30	   -30-degrees	\par Abduction (Active)	                 45-50	   73-13-iipknhp	\par Abduction (Passive)	 45-50	   69-79-lypfwfx	\par Adduction (Active)  	 20-30	   72-08-qvdwkdh	\par Adduction (Passive)	 20-30	   66-99-mmtzgga	\par Internal Rotation (Active) 	 35	   35-degrees	\par Internal Rotation (Passive)	 35	   35-degrees	\par External Rotation (Active)	 45	   45-degrees	\par External Rotation (Passive)	 45	   45-degrees	\par \par No tenderness with internal or external rotation or axial load.  No tenderness to palpation over the greater trochanter.  Negative Trendelenburg.  No tenderness with resisted abduction.  No weakness to flexion, extension, abduction or adduction.  No evidence of instability.  No motor or sensory deficits.  2+ DP and PT pulses.  Skin is intact.  No scars, rashes or lesions.  \par  \par Right Hip: Range of Motion in Degrees:\par 	                                  Claimant:	Normal:	\par Flexion (Active) 	                   120 	                120-degrees	\par Flexion (Passive)	                   120	                120-degrees	\par Extension (Active)	                   -30	                -30-degrees	\par Extension (Passive)	   -30	                -30-degrees	\par Abduction (Active)	                   45-50	                36-57-hjcwxda	\par Abduction (Passive)	   45-50	                40-81-xrsmwda	\par Adduction (Active)              	   20-30	                41-14-vqcefft	\par Adduction (Passive)	   20-30	                07-61-tfimdyn	\par Internal Rotation (Active) 	   35	                35-degrees	\par Internal Rotation (Passive)	   35	                35-degrees	\par External Rotation (Active)	   45	                45-degrees	\par External Rotation (Passive)	   45	                45-degrees	\par \par Tenderness with flexion, adduction and internal rotation.  Tenderness into the groin.  No tenderness to palpation over the greater trochanter.  Negative Trendelenburg.  No tenderness with resisted abduction.  No weakness to flexion, extension, abduction or adduction.  No evidence of instability.  No motor or sensory deficits.  2+ DP and PT pulses.  Skin is intact.  No scars, rashes or lesions.  \par   [de-identified] : Gait and Station:  Ambulating with a slightly antalgic to antalgic gait.  Normal Station.  [de-identified] : Appearance:  Well developed, well-nourished female in no acute distress.\par   [de-identified] : Radiographs, two to three views of the right hip and pelvis taken in the office today, show no obvious osseous abnormalities.\par

## 2024-02-15 NOTE — CONSULT NOTE ADULT - ASSESSMENT
24yo F w/ PMHx Crohns Dz on Remicade infusions q8 wks (first dose inpatient at time of dx 6/2023) due for next infusion March 2024) presents after having 5 days of severe diffuse cramping abdominal pain associated with many episodes, daily of blood and mucus streaked stool (7-8 BM/day). Pt reports feels like when she first presented with flare.    #Crohns Disease  *ESR 38, CRP 85.8  *Started PO Prednisone 40mg qD 2/12  *Due for next dose of Remicade March 2024  *C diff and GI PCR negative  #Leukocytosis  #Hepatomegaly + Elevated Alk phos    Recommendations  - trend daily CBC, CMP, PT/INR  - trend CRP daily  - c/w solumedrol 20 mg IV q8 hrs  - start PO PPI qAM  - DVT ppx  - provide low residue diet as tolerated  - analgesia, fluids, and antiemetics as per primary team  - f/u with GI Dr. Crump after discharge    -hepatomegaly and elevated alk phos noted during admit in 6/2023 - plan at that time was for OP f/u (extensive hep w/u sent and normal)  -Alk phos currently downtrending and not as high as levels in June.  -if uptrend or new LFT derangements would obtain hepatology consult in house  -No hepatocellular injury pattern at this time, patient will need OP hepatology f/u after discharge  -please provide number for liver clinic to patient prior to DC - [phone]      All recommendations preliminary until note signed by service attending.    Thank you for involving us in the care of this patient. Please contact should any concern or questions arise.    Taz Grover MD   Gastroenterology/Hepatology Fellow PGY-5  Available on [employer] Teams 7am - 5pm  [email or web address]    NON-URGENT CONSULTS:  Please email:  [email or web address]   OR  [email or web address]    After 5pm, please contact the on-call GI fellow. [phone]    AT NIGHT AND ON WEEKENDS:  Contact on-call GI fellow via answering service ([phone]) from 5pm-8am and on weekends/holidays

## 2024-02-15 NOTE — CONSULT NOTE ADULT - SUBJECTIVE AND OBJECTIVE BOX
Chief Complaint:  Patient is a 23y old  Female who presents with a chief complaint of Abdominal pain x 5 days, diarrhea (15 Feb 2024 09:36)      HPI:  22yo F w/ PMHx Crohns Dz on Remicade infusions q8 wks (due for next infusion March 2024) presents after experieicning 5 days of severe diffuse cramping abdominal pain associated with many episodes, daily of blood and mucus streaked stool (7-8 BM/day).  She was previously weaned off of her long-term prednisone a month ago, but was subsequently restarted on Prednisone 40mg qD on Monday with onset of the symptoms. Her OP [facility] is Dr. Rafat Crump. Reports no n/v, fever, chills, SOB, CP, urinary symptoms. No recent travel. Last c-scope 6/19/2023      Allergies:  No Known Allergies      Home Medications:  Prednisone 40mg qD since Monday 2/12  Remicade infusion q8 weeks, received 4-5 doses already, due for next dose 3/05/24    Hospital Medications:  acetaminophen     Tablet .. 650 milliGRAM(s) Oral every 6 hours PRN  acetaminophen     Tablet .. 650 milliGRAM(s) Oral every 6 hours PRN  aluminum hydroxide/magnesium hydroxide/simethicone Suspension 30 milliLiter(s) Oral every 4 hours PRN  lactated ringers. 1000 milliLiter(s) IV Continuous <Continuous>  melatonin 3 milliGRAM(s) Oral at bedtime PRN  methylPREDNISolone sodium succinate Injectable 20 milliGRAM(s) IV Push every 8 hours  morphine  - Injectable 2 milliGRAM(s) IV Push once  morphine  - Injectable 2 milliGRAM(s) IV Push every 3 hours PRN  ondansetron Injectable 4 milliGRAM(s) IV Push every 8 hours PRN  pantoprazole    Tablet 40 milliGRAM(s) Oral before breakfast  sodium chloride 0.9%. 1000 milliLiter(s) IV Continuous <Continuous>      PMHX/PSHX:  Unspecified dislocation of right thumb, sequela    Crohn disease    Hepatomegaly    Acute Crohn's disease    Progression of deep vein thrombosis (DVT)    Encounter for deep vein thrombosis (DVT) prophylaxis    No significant past surgical history    No significant past surgical history        Family history:  No pertinent family history in first degree relatives    FH: Crohn's disease (Sibling)    FH: diabetes mellitus (Father)        Denies family history of colon cancer/polyps, stomach cancer/polyps, pancreatic cancer/masses, liver cancer/disease, ovarian cancer and endometrial cancer.    Social History:     Tob: Denies  EtOH: As above  Illicit Drugs: Denies    ROS:   General:  No wt loss, fevers, chills, night sweats, fatigue  Eyes:  Good vision, no reported pain  ENT:  No sore throat, pain, runny nose, dysphagia  CV:  No pain, palpitations, hypo/hypertension  Pulm:  No dyspnea, cough, tachypnea, wheezing  GI:  As per HPI  :  No pain, bleeding, incontinence, nocturia  Muscle:  No pain, weakness  Neuro:  No weakness, tingling, memory problems  Psych:  No fatigue, insomnia, mood problems, depression  Endocrine:  No polyuria, polydipsia, cold/heat intolerance  Heme:  No petechiae, ecchymosis, easy bruisability  Skin:  No rash, tattoos, scars, edema    PHYSICAL EXAM:   GENERAL:  uncomfortable appearing, tearful  HEENT:  Normocephalic/atraumatic, no scleral icterus  CHEST:  No accessory muscle use, breathing room air comfortably  HEART:  Regular rate and rhythm  ABDOMEN:  Soft, diffusely tender to palpation, nondistended  EXTREMITIES: no b/l LE edema  SKIN:  No jaundice  NEURO:  Alert and oriented x 3    Vital Signs:  Vital Signs Last 24 Hrs  T(C): 36.7 (15 Feb 2024 07:02), Max: 36.7 (14 Feb 2024 16:29)  T(F): 98.1 (15 Feb 2024 07:02), Max: 98.1 (14 Feb 2024 16:29)  HR: 66 (15 Feb 2024 11:26) (66 - 82)  BP: 132/87 (15 Feb 2024 11:26) (112/73 - 132/87)  BP(mean): 84 (15 Feb 2024 07:02) (79 - 84)  RR: 16 (15 Feb 2024 11:26) (16 - 17)  SpO2: 100% (15 Feb 2024 11:26) (98% - 100%)    Parameters below as of 15 Feb 2024 11:26  Patient On (Oxygen Delivery Method): room air      Daily     Daily     LABS:                        11.4   15.94 )-----------( 328      ( 15 Feb 2024 05:42 )             34.1     Mean Cell Volume: 90.7 fL (02-15-24 @ 05:42)    02-15    136  |  101  |  6<L>  ----------------------------<  116<H>  4.0   |  23  |  0.81    Ca    8.7      15 Feb 2024 05:42  Mg     1.80     02-15    TPro  6.6  /  Alb  3.3  /  TBili  <0.2  /  DBili  x   /  AST  23  /  ALT  37<H>  /  AlkPhos  135<H>  02-15    LIVER FUNCTIONS - ( 15 Feb 2024 05:42 )  Alb: 3.3 g/dL / Pro: 6.6 g/dL / ALK PHOS: 135 U/L / ALT: 37 U/L / AST: 23 U/L / GGT: x             Urinalysis Basic - ( 15 Feb 2024 05:42 )    Color: x / Appearance: x / SG: x / pH: x  Gluc: 116 mg/dL / Ketone: x  / Bili: x / Urobili: x   Blood: x / Protein: x / Nitrite: x   Leuk Esterase: x / RBC: x / WBC x   Sq Epi: x / Non Sq Epi: x / Bacteria: x      Amylase Serum--      Lipase serum48       Ammonia--                          11.4   15.94 )-----------( 328      ( 15 Feb 2024 05:42 )             34.1                         12.6   16.39 )-----------( 374      ( 14 Feb 2024 19:57 )             37.8       Imaging:  < from: CT Abdomen and Pelvis w/ IV Cont (02.14.24 @ 20:14) >  ACC: [id number] EXAM:  CT ABDOMEN AND PELVIS IC   ORDERED BY: ADY BLACK     PROCEDURE DATE:  02/14/2024          INTERPRETATION:  CLINICAL INFORMATION: Abdominal pain. Crohn's disease    COMPARISON: CT abdomen and pelvis 6/15/2023    CONTRAST/COMPLICATIONS:  IV Contrast: Omnipaque 350  90 cc administered   10 cc discarded  Oral Contrast: NONE  Complications: None reported at time of study completion    PROCEDURE:  CT of the Abdomen and Pelvis was performed.  Sagittal and coronal reformats were performed.    FINDINGS:  LOWER CHEST: Within normal limits.    LIVER: Hepatomegaly approximately measures 21 cm. Mild focal fat   deposition adjacent to the falciform ligament.  Mild periportal edema  BILE DUCTS: Normal caliber.  GALLBLADDER: Decompressed  SPLEEN: Within normal limits.  PANCREAS: Within normal limits.  ADRENALS: Within normal limits.  KIDNEYS/URETERS: Symmetric renal enhancement without hydronephrosis.   Partial duplication of the left collecting system.  Few subcentimeter hypodensities too small for definitive characterization   by CT    BLADDER: Minimally distended.  REPRODUCTIVE ORGANS: Rim enhancing mildly crenulated 2.7 cm likely   involuting hemorrhagic cyst within right ovary    BOWEL: No bowel obstruction. Terminal ileal wall thickening compatible   with acute Crohn's ileitis.  Liquid density stool with air fluid levels throughout colon in keeping   with diarrheal illness.  Colonic wall thickening with hyperenhancement involving the left half   transverse colon, splenic flexure, descending and rectosigmoid colon in   keeping with acute proctocolitis  PERITONEUM: Trace volume pelvic fluid, within physiologic limits.  VESSELS: Within normal limits.  RETROPERITONEUM/LYMPH NODES: No lymphadenopathy.  ABDOMINAL WALL: No significant acute abnormality.  BONES: No significant acute bony abnormality.    IMPRESSION:  Acute Crohn's terminal ileitis and proctocolitis as described      --- End of Report ---    CRISTELA WEBB MD; Attending Radiologist  This document has been electronically signed. Feb 14 2024  8:27PM    < end of copied text >           Chief Complaint:  Patient is a 23y old  Female who presents with a chief complaint of Abdominal pain x 5 days, diarrhea (15 Feb 2024 09:36)      HPI:  22yo F w/ PMHx Crohns Dz on Remicade infusions q8 wks (first dose inpatient at time of dx 6/2023) due for next infusion March 2024) presents after having 5 days of severe diffuse cramping abdominal pain associated with many episodes, daily of blood and mucus streaked stool (7-8 BM/day).  She was previously weaned off of her long-term prednisone a month ago, but was subsequently restarted on Prednisone 40mg qD on Monday with onset of the symptoms. Her OP [facility] is Dr. Rafat Crump. Reports no n/v, fever, chills, SOB, CP, urinary symptoms. No recent travel. Last c-scope 6/19/2023 at which time she was dx on path with Crohns Dz.      Allergies:  No Known Allergies      Home Medications:  Prednisone 40mg qD since Monday 2/12  Remicade infusion q8 weeks, received 4-5 doses already, due for next dose 3/05/24    Hospital Medications:  acetaminophen     Tablet .. 650 milliGRAM(s) Oral every 6 hours PRN  acetaminophen     Tablet .. 650 milliGRAM(s) Oral every 6 hours PRN  aluminum hydroxide/magnesium hydroxide/simethicone Suspension 30 milliLiter(s) Oral every 4 hours PRN  lactated ringers. 1000 milliLiter(s) IV Continuous <Continuous>  melatonin 3 milliGRAM(s) Oral at bedtime PRN  methylPREDNISolone sodium succinate Injectable 20 milliGRAM(s) IV Push every 8 hours  morphine  - Injectable 2 milliGRAM(s) IV Push once  morphine  - Injectable 2 milliGRAM(s) IV Push every 3 hours PRN  ondansetron Injectable 4 milliGRAM(s) IV Push every 8 hours PRN  pantoprazole    Tablet 40 milliGRAM(s) Oral before breakfast  sodium chloride 0.9%. 1000 milliLiter(s) IV Continuous <Continuous>      PMHX/PSHX:  Unspecified dislocation of right thumb, sequela    Crohn disease    Hepatomegaly    Acute Crohn's disease    Progression of deep vein thrombosis (DVT)    Encounter for deep vein thrombosis (DVT) prophylaxis    No significant past surgical history    No significant past surgical history        Family history:  No pertinent family history in first degree relatives    FH: Crohn's disease (Sibling)    FH: diabetes mellitus (Father)      Social History:     Tob: Denies  EtOH: As above  Illicit Drugs: Denies    ROS:   General:  No wt loss, fevers, chills, night sweats, fatigue  Eyes:  Good vision, no reported pain  ENT:  No sore throat, pain, runny nose, dysphagia  CV:  No pain, palpitations, hypo/hypertension  Pulm:  No dyspnea, cough, tachypnea, wheezing  GI:  As per HPI  :  No pain, bleeding, incontinence, nocturia  Muscle:  No pain, weakness  Neuro:  No weakness, tingling, memory problems  Psych:  No fatigue, insomnia, mood problems, depression  Endocrine:  No polyuria, polydipsia, cold/heat intolerance  Heme:  No petechiae, ecchymosis, easy bruisability  Skin:  No rash, tattoos, scars, edema    PHYSICAL EXAM:   GENERAL:  uncomfortable appearing, tearful  HEENT:  Normocephalic/atraumatic, no scleral icterus  CHEST:  No accessory muscle use, breathing room air comfortably  HEART:  Regular rate and rhythm  ABDOMEN:  Soft, diffusely tender to palpation, nondistended  EXTREMITIES: no b/l LE edema  SKIN:  No jaundice  NEURO:  Alert and oriented x 3    Vital Signs:  Vital Signs Last 24 Hrs  T(C): 36.7 (15 Feb 2024 07:02), Max: 36.7 (14 Feb 2024 16:29)  T(F): 98.1 (15 Feb 2024 07:02), Max: 98.1 (14 Feb 2024 16:29)  HR: 66 (15 Feb 2024 11:26) (66 - 82)  BP: 132/87 (15 Feb 2024 11:26) (112/73 - 132/87)  BP(mean): 84 (15 Feb 2024 07:02) (79 - 84)  RR: 16 (15 Feb 2024 11:26) (16 - 17)  SpO2: 100% (15 Feb 2024 11:26) (98% - 100%)    Parameters below as of 15 Feb 2024 11:26  Patient On (Oxygen Delivery Method): room air      Daily     Daily     LABS:                        11.4   15.94 )-----------( 328      ( 15 Feb 2024 05:42 )             34.1     Mean Cell Volume: 90.7 fL (02-15-24 @ 05:42)    02-15    136  |  101  |  6<L>  ----------------------------<  116<H>  4.0   |  23  |  0.81    Ca    8.7      15 Feb 2024 05:42  Mg     1.80     02-15    TPro  6.6  /  Alb  3.3  /  TBili  <0.2  /  DBili  x   /  AST  23  /  ALT  37<H>  /  AlkPhos  135<H>  02-15    LIVER FUNCTIONS - ( 15 Feb 2024 05:42 )  Alb: 3.3 g/dL / Pro: 6.6 g/dL / ALK PHOS: 135 U/L / ALT: 37 U/L / AST: 23 U/L / GGT: x             Urinalysis Basic - ( 15 Feb 2024 05:42 )    Color: x / Appearance: x / SG: x / pH: x  Gluc: 116 mg/dL / Ketone: x  / Bili: x / Urobili: x   Blood: x / Protein: x / Nitrite: x   Leuk Esterase: x / RBC: x / WBC x   Sq Epi: x / Non Sq Epi: x / Bacteria: x      Amylase Serum--      Lipase serum48       Ammonia--                          11.4   15.94 )-----------( 328      ( 15 Feb 2024 05:42 )             34.1                         12.6   16.39 )-----------( 374      ( 14 Feb 2024 19:57 )             37.8       Imaging:  < from: CT Abdomen and Pelvis w/ IV Cont (02.14.24 @ 20:14) >  ACC: [id number] EXAM:  CT ABDOMEN AND PELVIS IC   ORDERED BY: ADY BLACK     PROCEDURE DATE:  02/14/2024          INTERPRETATION:  CLINICAL INFORMATION: Abdominal pain. Crohn's disease    COMPARISON: CT abdomen and pelvis 6/15/2023    CONTRAST/COMPLICATIONS:  IV Contrast: Omnipaque 350  90 cc administered   10 cc discarded  Oral Contrast: NONE  Complications: None reported at time of study completion    PROCEDURE:  CT of the Abdomen and Pelvis was performed.  Sagittal and coronal reformats were performed.    FINDINGS:  LOWER CHEST: Within normal limits.    LIVER: Hepatomegaly approximately measures 21 cm. Mild focal fat   deposition adjacent to the falciform ligament.  Mild periportal edema  BILE DUCTS: Normal caliber.  GALLBLADDER: Decompressed  SPLEEN: Within normal limits.  PANCREAS: Within normal limits.  ADRENALS: Within normal limits.  KIDNEYS/URETERS: Symmetric renal enhancement without hydronephrosis.   Partial duplication of the left collecting system.  Few subcentimeter hypodensities too small for definitive characterization   by CT    BLADDER: Minimally distended.  REPRODUCTIVE ORGANS: Rim enhancing mildly crenulated 2.7 cm likely   involuting hemorrhagic cyst within right ovary    BOWEL: No bowel obstruction. Terminal ileal wall thickening compatible   with acute Crohn's ileitis.  Liquid density stool with air fluid levels throughout colon in keeping   with diarrheal illness.  Colonic wall thickening with hyperenhancement involving the left half   transverse colon, splenic flexure, descending and rectosigmoid colon in   keeping with acute proctocolitis  PERITONEUM: Trace volume pelvic fluid, within physiologic limits.  VESSELS: Within normal limits.  RETROPERITONEUM/LYMPH NODES: No lymphadenopathy.  ABDOMINAL WALL: No significant acute abnormality.  BONES: No significant acute bony abnormality.    IMPRESSION:  Acute Crohn's terminal ileitis and proctocolitis as described      --- End of Report ---    CRISTELA WEBB MD; Attending Radiologist  This document has been electronically signed. Feb 14 2024  8:27PM    < end of copied text >      Specimen(s) Submitted   1- Terminal ileum biopsy   2- Cecum / ascending colon bx   3- Transverse colon biopsy   4- Descending colon biopsy   Final Diagnosis   1. Terminal ileum, biopsy   - Chronic inactive enteritis with small non-necrotizing granulomas,   see note   - Negative for dysplasia   2. Colon, cecum / ascending, biopsy   - Chronic active colitis with granulation tissue and fibrinopurulent   exudate, see note   - Negative for dysplasia   3. Transverse colon, biopsy   - Chronic focal active colitis   - Negative for dysplasia   4. Descending colon, biopsy   - Chronic focal active colitis with small well-formed non-necrotizing   granuloma, see note   - Negative for dysplasia   Note:   The overall findings are suggestive of Crohn's disease. Per   Parts 1-4:   EMR, infectious workup is negative. AFB and GMS stains are in progress   on   parts 1 and 4, and CMV IHC is in progress on part 2; the findings will be   reported in an addendum.   Verified by: Marshall Sheikh DO   (Electronic Signature)   Reported on: 06/22/23 16:31 EDT, [facility], [address], [place] [address]   Phone: [phone] Fax: [phone] Addendum Report - Auth (Verified)   Addendum   Part 1, 4: GMS and AFB stains are negative for fungal organisms and acid-   fast bacilli, respectively.   Part 2: CMV IHC stain is negative.      Chief Complaint:  Patient is a 23y old  Female who presents with a chief complaint of Abdominal pain x 5 days, diarrhea (15 Feb 2024 09:36)      HPI:  22yo F w/ PMHx Crohns Dz on Remicade infusions q8 wks (first dose inpatient at time of dx 6/2023, due for next infusion March 2024) presents after having 5 days of severe diffuse cramping abdominal pain associated with many episodes, daily of blood and mucus streaked stool (7-8 BM/day).  She was previously weaned off of her long-term prednisone a month ago, but was subsequently restarted on Prednisone 40mg qD on Monday with onset of the symptoms. Her OP [facility] is Dr. Rafat Crump. Reports no n/v, fever, chills, SOB, CP, urinary symptoms. No recent travel. Last c-scope 6/19/2023 at which time she was dx on path with Crohns Dz.      Allergies:  No Known Allergies      Home Medications:  Prednisone 40mg qD since Monday 2/12  Remicade infusion q8 weeks, received 4-5 doses already, due for next dose 3/05/24    Hospital Medications:  acetaminophen     Tablet .. 650 milliGRAM(s) Oral every 6 hours PRN  acetaminophen     Tablet .. 650 milliGRAM(s) Oral every 6 hours PRN  aluminum hydroxide/magnesium hydroxide/simethicone Suspension 30 milliLiter(s) Oral every 4 hours PRN  lactated ringers. 1000 milliLiter(s) IV Continuous <Continuous>  melatonin 3 milliGRAM(s) Oral at bedtime PRN  methylPREDNISolone sodium succinate Injectable 20 milliGRAM(s) IV Push every 8 hours  morphine  - Injectable 2 milliGRAM(s) IV Push once  morphine  - Injectable 2 milliGRAM(s) IV Push every 3 hours PRN  ondansetron Injectable 4 milliGRAM(s) IV Push every 8 hours PRN  pantoprazole    Tablet 40 milliGRAM(s) Oral before breakfast  sodium chloride 0.9%. 1000 milliLiter(s) IV Continuous <Continuous>      PMHX/PSHX:  Unspecified dislocation of right thumb, sequela    Crohn disease    Hepatomegaly    Acute Crohn's disease    Progression of deep vein thrombosis (DVT)    Encounter for deep vein thrombosis (DVT) prophylaxis    No significant past surgical history    No significant past surgical history        Family history:  No pertinent family history in first degree relatives    FH: Crohn's disease (Sibling)    FH: diabetes mellitus (Father)      Social History:     Tob: Denies  EtOH: As above  Illicit Drugs: Denies    ROS:   General:  No wt loss, fevers, chills, night sweats, fatigue  Eyes:  Good vision, no reported pain  ENT:  No sore throat, pain, runny nose, dysphagia  CV:  No pain, palpitations, hypo/hypertension  Pulm:  No dyspnea, cough, tachypnea, wheezing  GI:  As per HPI  :  No pain, bleeding, incontinence, nocturia  Muscle:  No pain, weakness  Neuro:  No weakness, tingling, memory problems  Psych:  No fatigue, insomnia, mood problems, depression  Endocrine:  No polyuria, polydipsia, cold/heat intolerance  Heme:  No petechiae, ecchymosis, easy bruisability  Skin:  No rash, tattoos, scars, edema    PHYSICAL EXAM:   GENERAL:  uncomfortable appearing, tearful  HEENT:  Normocephalic/atraumatic, no scleral icterus  CHEST:  No accessory muscle use, breathing room air comfortably  HEART:  Regular rate and rhythm  ABDOMEN:  Soft, diffusely tender to palpation, nondistended  EXTREMITIES: no b/l LE edema  SKIN:  No jaundice  NEURO:  Alert and oriented x 3    Vital Signs:  Vital Signs Last 24 Hrs  T(C): 36.7 (15 Feb 2024 07:02), Max: 36.7 (14 Feb 2024 16:29)  T(F): 98.1 (15 Feb 2024 07:02), Max: 98.1 (14 Feb 2024 16:29)  HR: 66 (15 Feb 2024 11:26) (66 - 82)  BP: 132/87 (15 Feb 2024 11:26) (112/73 - 132/87)  BP(mean): 84 (15 Feb 2024 07:02) (79 - 84)  RR: 16 (15 Feb 2024 11:26) (16 - 17)  SpO2: 100% (15 Feb 2024 11:26) (98% - 100%)    Parameters below as of 15 Feb 2024 11:26  Patient On (Oxygen Delivery Method): room air      Daily     Daily     LABS:                        11.4   15.94 )-----------( 328      ( 15 Feb 2024 05:42 )             34.1     Mean Cell Volume: 90.7 fL (02-15-24 @ 05:42)    02-15    136  |  101  |  6<L>  ----------------------------<  116<H>  4.0   |  23  |  0.81    Ca    8.7      15 Feb 2024 05:42  Mg     1.80     02-15    TPro  6.6  /  Alb  3.3  /  TBili  <0.2  /  DBili  x   /  AST  23  /  ALT  37<H>  /  AlkPhos  135<H>  02-15    LIVER FUNCTIONS - ( 15 Feb 2024 05:42 )  Alb: 3.3 g/dL / Pro: 6.6 g/dL / ALK PHOS: 135 U/L / ALT: 37 U/L / AST: 23 U/L / GGT: x             Urinalysis Basic - ( 15 Feb 2024 05:42 )    Color: x / Appearance: x / SG: x / pH: x  Gluc: 116 mg/dL / Ketone: x  / Bili: x / Urobili: x   Blood: x / Protein: x / Nitrite: x   Leuk Esterase: x / RBC: x / WBC x   Sq Epi: x / Non Sq Epi: x / Bacteria: x      Amylase Serum--      Lipase serum48       Ammonia--                          11.4   15.94 )-----------( 328      ( 15 Feb 2024 05:42 )             34.1                         12.6   16.39 )-----------( 374      ( 14 Feb 2024 19:57 )             37.8       Imaging:  < from: CT Abdomen and Pelvis w/ IV Cont (02.14.24 @ 20:14) >  ACC: [id number] EXAM:  CT ABDOMEN AND PELVIS IC   ORDERED BY: ADY BLACK     PROCEDURE DATE:  02/14/2024          INTERPRETATION:  CLINICAL INFORMATION: Abdominal pain. Crohn's disease    COMPARISON: CT abdomen and pelvis 6/15/2023    CONTRAST/COMPLICATIONS:  IV Contrast: Omnipaque 350  90 cc administered   10 cc discarded  Oral Contrast: NONE  Complications: None reported at time of study completion    PROCEDURE:  CT of the Abdomen and Pelvis was performed.  Sagittal and coronal reformats were performed.    FINDINGS:  LOWER CHEST: Within normal limits.    LIVER: Hepatomegaly approximately measures 21 cm. Mild focal fat   deposition adjacent to the falciform ligament.  Mild periportal edema  BILE DUCTS: Normal caliber.  GALLBLADDER: Decompressed  SPLEEN: Within normal limits.  PANCREAS: Within normal limits.  ADRENALS: Within normal limits.  KIDNEYS/URETERS: Symmetric renal enhancement without hydronephrosis.   Partial duplication of the left collecting system.  Few subcentimeter hypodensities too small for definitive characterization   by CT    BLADDER: Minimally distended.  REPRODUCTIVE ORGANS: Rim enhancing mildly crenulated 2.7 cm likely   involuting hemorrhagic cyst within right ovary    BOWEL: No bowel obstruction. Terminal ileal wall thickening compatible   with acute Crohn's ileitis.  Liquid density stool with air fluid levels throughout colon in keeping   with diarrheal illness.  Colonic wall thickening with hyperenhancement involving the left half   transverse colon, splenic flexure, descending and rectosigmoid colon in   keeping with acute proctocolitis  PERITONEUM: Trace volume pelvic fluid, within physiologic limits.  VESSELS: Within normal limits.  RETROPERITONEUM/LYMPH NODES: No lymphadenopathy.  ABDOMINAL WALL: No significant acute abnormality.  BONES: No significant acute bony abnormality.    IMPRESSION:  Acute Crohn's terminal ileitis and proctocolitis as described      --- End of Report ---    CRISTELA WEBB MD; Attending Radiologist  This document has been electronically signed. Feb 14 2024  8:27PM    < end of copied text >      Specimen(s) Submitted   1- Terminal ileum biopsy   2- Cecum / ascending colon bx   3- Transverse colon biopsy   4- Descending colon biopsy   Final Diagnosis   1. Terminal ileum, biopsy   - Chronic inactive enteritis with small non-necrotizing granulomas,   see note   - Negative for dysplasia   2. Colon, cecum / ascending, biopsy   - Chronic active colitis with granulation tissue and fibrinopurulent   exudate, see note   - Negative for dysplasia   3. Transverse colon, biopsy   - Chronic focal active colitis   - Negative for dysplasia   4. Descending colon, biopsy   - Chronic focal active colitis with small well-formed non-necrotizing   granuloma, see note   - Negative for dysplasia   Note:   The overall findings are suggestive of Crohn's disease. Per   Parts 1-4:   EMR, infectious workup is negative. AFB and GMS stains are in progress   on   parts 1 and 4, and CMV IHC is in progress on part 2; the findings will be   reported in an addendum.   Verified by: Marshall Sheikh DO   (Electronic Signature)   Reported on: 06/22/23 16:31 EDT, [facility], [address], [place] [address]   Phone: [phone] Fax: [phone] Addendum Report - Auth (Verified)   Addendum   Part 1, 4: GMS and AFB stains are negative for fungal organisms and acid-   fast bacilli, respectively.   Part 2: CMV IHC stain is negative.

## 2024-02-15 NOTE — PATIENT PROFILE ADULT - FALL HARM RISK - RISK INTERVENTIONS
Assistance with ambulation/Communicate Fall Risk and Risk Factors to all staff, patient, and family/Monitor for mental status changes/Monitor gait and stability/Reinforce activity limits and safety measures with patient and family/Reorient to person, place and time as needed/Review medications for side effects contributing to fall risk/Sit up slowly, dangle for a short time, stand at bedside before walking/Toileting schedule using arm’s reach rule for commode and bathroom/Use of alarms - bed, chair and/or voice tab/Visual Cue: Yellow wristband/Bed in lowest position, wheels locked, appropriate side rails in place/Call bell, personal items and telephone in reach/Instruct patient to call for assistance before getting out of bed or chair/Non-slip footwear when patient is out of bed/[place] to call system/Physically safe environment - no spills, clutter or unnecessary equipment/Purposeful Proactive Rounding/Room/bathroom lighting operational, light cord in reach

## 2024-02-15 NOTE — PROGRESS NOTE ADULT - PROBLEM SELECTOR PLAN 1
CT A/P: Terminal ileal wall thickening compatible with acute Crohn's ileitis. Liquid density stool with air fluid levels throughout colon in keeping with diarrheal illness. Colonic wall thickening with hyperenhancement involving the left half   transverse colon, splenic flexure, descending and rectosigmoid colon in keeping with acute proctocolitis  WBC=16K  ESR=38  CRP=85  High suspicion for Crohn's flare;    -f/u C diff, GI PCR  -Morphine IVP PRN  -IV hydration  -Starter empiric Solumedrol 20mg IVP q8h x 3 doses, pending further GI recs  -Formal GI c/s requested   -Monitor for fever, Hold Abx for now   -VS q4h CT A/P: Terminal ileal wall thickening compatible with acute Crohn's ileitis. Liquid density stool with air fluid levels throughout colon in keeping with diarrheal illness. Colonic wall thickening with hyperenhancement involving the left half   transverse colon, splenic flexure, descending and rectosigmoid colon in keeping with acute proctocolitis  WBC=16K  ESR=38  CRP=85  High suspicion for Crohn's flare;    -f/u C diff,   GI PCR--> negative  -Morphine IVP PRN  -IV hydration  -Starter empiric Solumedrol 20mg IVP q8h x 3 doses, pending further GI recs  -Formal GI c/s requested   -Monitor for fever, Hold Abx for now   -VS q4h CT A/P: Terminal ileal wall thickening compatible with acute Crohn's ileitis. Liquid density stool with air fluid levels throughout colon in keeping with diarrheal illness. Colonic wall thickening with hyperenhancement involving the left half   transverse colon, splenic flexure, descending and rectosigmoid colon in keeping with acute proctocolitis  WBC=16K, ESR=38 CRP=85  2/13/24 CT a/p c/w Crohn's flare;  -f/u C diff,   GI PCR--> negative  -Morphine  2 mg q3 hrs IVP PRN  -IV hydration  -Starter empiric Solumedrol 20mg IVP q8h x 3 doses, pending further GI recs  -Formal GI c/s requested   -Monitor for fever, Hold Abx for now   -VS q4h

## 2024-02-15 NOTE — PROGRESS NOTE ADULT - ASSESSMENT
23-year-old female with FHX of Crohn's (sister)  personal  history of Crohn's disease, diagnosed in 2023 a/w likely acute Crohn's flare requiring Morphine IV for pain control. Found to have hepatomegaly and likely hemorrhagic cyst within right ovary. 23-year-old female with FHX of Crohn's (sister)  personal  history of Crohn's disease, diagnosed in 2023 a/w likely acute Crohn's flare requiring Morphine IV for pain control. Found to have hepatomegaly and likely hemorrhagic cyst within right ovary.    rd< from: CT Abdomen and Pelvis w/ IV Cont (02.14.24 @ 20:14) >  IMPRESSION:  Acute Crohn's terminal ileitis and proctocolitis as described    < end of copied text >  < from: CT Abdomen and Pelvis w/ IV Cont (02.14.24 @ 20:14) >  LIVER: Hepatomegaly approximately measures 21 cm. Mild focal fat   deposition adjacent to the falciform ligament.    < end of copied text >  < from: CT Abdomen and Pelvis w/ IV Cont (02.14.24 @ 20:14) >  REPRODUCTIVE ORGANS: Rim enhancing mildly crenulated 2.7 cm likely   involuting hemorrhagic cyst within right ovary      < end of copied text >   23-year-old female with FHX of Crohn's (sister)  personal  history of Crohn's disease, diagnosed in 2023 a/w likely acute Crohn's flare requiring Morphine IV for pain control. Found to have hepatomegaly and likely hemorrhagic cyst within right ovary.    rd< from: CT Abdomen and Pelvis w/ IV Cont (02.14.24 @ 20:14) >  IMPRESSION:  Acute Crohn's terminal ileitis and proctocolitis as described    LIVER: Hepatomegaly approximately measures 21 cm. Mild focal fat   deposition adjacent to the falciform ligament.    REPRODUCTIVE ORGANS: Rim enhancing mildly crenulated 2.7 cm likely   involuting hemorrhagic cyst within right ovary

## 2024-02-16 LAB
ADD ON TEST-SPECIMEN IN LAB: SIGNIFICANT CHANGE UP
ALBUMIN SERPL ELPH-MCNC: 3.3 G/DL — SIGNIFICANT CHANGE UP (ref 3.3–5)
ALP SERPL-CCNC: 138 U/L — HIGH (ref 40–120)
ALT FLD-CCNC: 35 U/L — HIGH (ref 4–33)
ANION GAP SERPL CALC-SCNC: 11 MMOL/L — SIGNIFICANT CHANGE UP (ref 7–14)
AST SERPL-CCNC: 17 U/L — SIGNIFICANT CHANGE UP (ref 4–32)
BASOPHILS # BLD AUTO: 0.05 K/UL — SIGNIFICANT CHANGE UP (ref 0–0.2)
BASOPHILS NFR BLD AUTO: 0.3 % — SIGNIFICANT CHANGE UP (ref 0–2)
BILIRUB DIRECT SERPL-MCNC: <0.2 MG/DL — SIGNIFICANT CHANGE UP (ref 0–0.3)
BILIRUB INDIRECT FLD-MCNC: SIGNIFICANT CHANGE UP MG/DL (ref 0–1)
BILIRUB SERPL-MCNC: <0.2 MG/DL — SIGNIFICANT CHANGE UP (ref 0.2–1.2)
BILIRUB SERPL-MCNC: <0.2 MG/DL — SIGNIFICANT CHANGE UP (ref 0.2–1.2)
BUN SERPL-MCNC: 6 MG/DL — LOW (ref 7–23)
CALCIUM SERPL-MCNC: 9 MG/DL — SIGNIFICANT CHANGE UP (ref 8.4–10.5)
CHLORIDE SERPL-SCNC: 103 MMOL/L — SIGNIFICANT CHANGE UP (ref 98–107)
CO2 SERPL-SCNC: 22 MMOL/L — SIGNIFICANT CHANGE UP (ref 22–31)
CREAT SERPL-MCNC: 0.65 MG/DL — SIGNIFICANT CHANGE UP (ref 0.5–1.3)
EGFR: 125 ML/MIN/1.73M2 — SIGNIFICANT CHANGE UP
EOSINOPHIL # BLD AUTO: 0.01 K/UL — SIGNIFICANT CHANGE UP (ref 0–0.5)
EOSINOPHIL NFR BLD AUTO: 0.1 % — SIGNIFICANT CHANGE UP (ref 0–6)
GLUCOSE SERPL-MCNC: 104 MG/DL — HIGH (ref 70–99)
HCT VFR BLD CALC: 36.3 % — SIGNIFICANT CHANGE UP (ref 34.5–45)
HGB BLD-MCNC: 11.9 G/DL — SIGNIFICANT CHANGE UP (ref 11.5–15.5)
IANC: 10.61 K/UL — HIGH (ref 1.8–7.4)
IMM GRANULOCYTES NFR BLD AUTO: 0.8 % — SIGNIFICANT CHANGE UP (ref 0–0.9)
INR BLD: 1.21 RATIO — HIGH (ref 0.85–1.18)
LYMPHOCYTES # BLD AUTO: 1.58 K/UL — SIGNIFICANT CHANGE UP (ref 1–3.3)
LYMPHOCYTES # BLD AUTO: 11 % — LOW (ref 13–44)
MAGNESIUM SERPL-MCNC: 2 MG/DL — SIGNIFICANT CHANGE UP (ref 1.6–2.6)
MCHC RBC-ENTMCNC: 29.6 PG — SIGNIFICANT CHANGE UP (ref 27–34)
MCHC RBC-ENTMCNC: 32.8 GM/DL — SIGNIFICANT CHANGE UP (ref 32–36)
MCV RBC AUTO: 90.3 FL — SIGNIFICANT CHANGE UP (ref 80–100)
MELD SCORE WITH DIALYSIS: 23 POINTS — SIGNIFICANT CHANGE UP
MELD SCORE WITHOUT DIALYSIS: 9 POINTS — SIGNIFICANT CHANGE UP
MONOCYTES # BLD AUTO: 1.95 K/UL — HIGH (ref 0–0.9)
MONOCYTES NFR BLD AUTO: 13.6 % — SIGNIFICANT CHANGE UP (ref 2–14)
NEUTROPHILS # BLD AUTO: 10.61 K/UL — HIGH (ref 1.8–7.4)
NEUTROPHILS NFR BLD AUTO: 74.2 % — SIGNIFICANT CHANGE UP (ref 43–77)
NRBC # BLD: 0 /100 WBCS — SIGNIFICANT CHANGE UP (ref 0–0)
NRBC # FLD: 0 K/UL — SIGNIFICANT CHANGE UP (ref 0–0)
PHOSPHATE SERPL-MCNC: 3.8 MG/DL — SIGNIFICANT CHANGE UP (ref 2.5–4.5)
PLATELET # BLD AUTO: 330 K/UL — SIGNIFICANT CHANGE UP (ref 150–400)
POTASSIUM SERPL-MCNC: 4.3 MMOL/L — SIGNIFICANT CHANGE UP (ref 3.5–5.3)
POTASSIUM SERPL-SCNC: 4.3 MMOL/L — SIGNIFICANT CHANGE UP (ref 3.5–5.3)
PROT SERPL-MCNC: 6.8 G/DL — SIGNIFICANT CHANGE UP (ref 6–8.3)
PROTHROM AB SERPL-ACNC: 13.5 SEC — HIGH (ref 9.5–13)
RBC # BLD: 4.02 M/UL — SIGNIFICANT CHANGE UP (ref 3.8–5.2)
RBC # FLD: 13.4 % — SIGNIFICANT CHANGE UP (ref 10.3–14.5)
SODIUM SERPL-SCNC: 136 MMOL/L — SIGNIFICANT CHANGE UP (ref 135–145)
WBC # BLD: 14.32 K/UL — HIGH (ref 3.8–10.5)
WBC # FLD AUTO: 14.32 K/UL — HIGH (ref 3.8–10.5)

## 2024-02-16 PROCEDURE — 99233 SBSQ HOSP IP/OBS HIGH 50: CPT

## 2024-02-16 PROCEDURE — 99232 SBSQ HOSP IP/OBS MODERATE 35: CPT | Mod: GC

## 2024-02-16 RX ORDER — MORPHINE SULFATE 50 MG/1
3 CAPSULE, EXTENDED RELEASE ORAL EVERY 4 HOURS
Refills: 0 | Status: DISCONTINUED | OUTPATIENT
Start: 2024-02-16 | End: 2024-02-20

## 2024-02-16 RX ORDER — KETOROLAC TROMETHAMINE 30 MG/ML
15 SYRINGE (ML) INJECTION EVERY 6 HOURS
Refills: 0 | Status: DISCONTINUED | OUTPATIENT
Start: 2024-02-16 | End: 2024-02-19

## 2024-02-16 RX ORDER — KETOROLAC TROMETHAMINE 30 MG/ML
15 SYRINGE (ML) INJECTION ONCE
Refills: 0 | Status: DISCONTINUED | OUTPATIENT
Start: 2024-02-16 | End: 2024-02-16

## 2024-02-16 RX ADMIN — Medication 20 MILLIGRAM(S): at 15:45

## 2024-02-16 RX ADMIN — MORPHINE SULFATE 2 MILLIGRAM(S): 50 CAPSULE, EXTENDED RELEASE ORAL at 02:00

## 2024-02-16 RX ADMIN — Medication 20 MILLIGRAM(S): at 05:22

## 2024-02-16 RX ADMIN — MORPHINE SULFATE 2 MILLIGRAM(S): 50 CAPSULE, EXTENDED RELEASE ORAL at 01:42

## 2024-02-16 RX ADMIN — Medication 15 MILLIGRAM(S): at 10:47

## 2024-02-16 RX ADMIN — MORPHINE SULFATE 2 MILLIGRAM(S): 50 CAPSULE, EXTENDED RELEASE ORAL at 08:32

## 2024-02-16 RX ADMIN — MORPHINE SULFATE 3 MILLIGRAM(S): 50 CAPSULE, EXTENDED RELEASE ORAL at 22:50

## 2024-02-16 RX ADMIN — MORPHINE SULFATE 3 MILLIGRAM(S): 50 CAPSULE, EXTENDED RELEASE ORAL at 16:56

## 2024-02-16 RX ADMIN — Medication 15 MILLIGRAM(S): at 10:17

## 2024-02-16 RX ADMIN — PANTOPRAZOLE SODIUM 40 MILLIGRAM(S): 20 TABLET, DELAYED RELEASE ORAL at 05:21

## 2024-02-16 RX ADMIN — Medication 20 MILLIGRAM(S): at 22:21

## 2024-02-16 RX ADMIN — MORPHINE SULFATE 2 MILLIGRAM(S): 50 CAPSULE, EXTENDED RELEASE ORAL at 09:02

## 2024-02-16 RX ADMIN — MORPHINE SULFATE 3 MILLIGRAM(S): 50 CAPSULE, EXTENDED RELEASE ORAL at 22:36

## 2024-02-16 RX ADMIN — MORPHINE SULFATE 3 MILLIGRAM(S): 50 CAPSULE, EXTENDED RELEASE ORAL at 12:58

## 2024-02-16 RX ADMIN — MORPHINE SULFATE 2 MILLIGRAM(S): 50 CAPSULE, EXTENDED RELEASE ORAL at 05:37

## 2024-02-16 RX ADMIN — MORPHINE SULFATE 3 MILLIGRAM(S): 50 CAPSULE, EXTENDED RELEASE ORAL at 12:28

## 2024-02-16 RX ADMIN — MORPHINE SULFATE 3 MILLIGRAM(S): 50 CAPSULE, EXTENDED RELEASE ORAL at 22:21

## 2024-02-16 RX ADMIN — MORPHINE SULFATE 3 MILLIGRAM(S): 50 CAPSULE, EXTENDED RELEASE ORAL at 17:26

## 2024-02-16 RX ADMIN — MORPHINE SULFATE 2 MILLIGRAM(S): 50 CAPSULE, EXTENDED RELEASE ORAL at 05:22

## 2024-02-16 NOTE — DIETITIAN INITIAL EVALUATION ADULT - OTHER INFO
23 year old female with a PMH of Crohn's disease, diagnosed in 2023 a/w likely acute Crohn's flare per chart.    Patient previously on clear liquids and now on low fiber diet. Reports tolerating liquids well. Still w/ abdominal pain. Has no food allergies, but avoids milk products. States she weighs herself consistency and has been 165 lbs and didn't lose weight PTA. ABW is 150 lbs. (2/15) per chart, ?accuracy at this time given patients report. No edema or pressure injuries noted per RN flow sheet.    Patient w/ questions regarding Crohn's nutrition therapy, which was discussed. Patient reports receiving low fiber nutrition education many times PTA, but was still amenable to low fiber handout.

## 2024-02-16 NOTE — DIETITIAN INITIAL EVALUATION ADULT - PERTINENT LABORATORY DATA
02-16    136  |  103  |  6<L>  ----------------------------<  104<H>  4.3   |  22  |  0.65    Ca    9.0      16 Feb 2024 07:34  Phos  3.8     02-16  Mg     2.00     02-16    TPro  6.8  /  Alb  3.3  /  TBili  <0.2  /  DBili  <0.2  /  AST  17  /  ALT  35<H>  /  AlkPhos  138<H>  02-16

## 2024-02-16 NOTE — DIETITIAN INITIAL EVALUATION ADULT - PROBLEM SELECTOR PLAN 2
CT A/P: Hepatomegaly approximately measures 21 cm. Mild focal fat deposition adjacent to the falciform ligament. Mild periportal edema.  Elevated ALPH and ALT     -GI/Hepatology c/s

## 2024-02-16 NOTE — PROGRESS NOTE ADULT - PROBLEM SELECTOR PLAN 1
CT A/P: Terminal ileal wall thickening compatible with acute Crohn's ileitis. Liquid density stool with air fluid levels throughout colon in keeping with diarrheal illness. Colonic wall thickening with hyperenhancement involving the left half transverse colon, splenic flexure, descending and rectosigmoid colon in keeping with acute proctocolitis  WBC=16K, ESR=38 CRP=85  2/13/24 CT a/p c/w Crohn's flare;  - pt reports her outpt GI is Dr. Rafat Crump at [facility], who had told her to inc her prednisone to 40 mg for Crohn's flare, and advised her to come to hospital if symptoms worsen. She reports she gets remicade , next one due on March 5  -C. diff and GI PCR negative  -inc Morphine  3 mg q4h prn severe pain, add toradol 15 mg q6h prn mod pain  -IV hydration  -GI consulted, c/w solumedrol 20mg q8h, advance diet to low residual diet   -f/u GI recs   -Monitor for fever, Hold Abx for now   -VS q4h CT A/P: Terminal ileal wall thickening compatible with acute Crohn's ileitis. Liquid density stool with air fluid levels throughout colon in keeping with diarrheal illness. Colonic wall thickening with hyperenhancement involving the left half transverse colon, splenic flexure, descending and rectosigmoid colon in keeping with acute proctocolitis  WBC=16K, ESR=38 CRP=85  2/13/24 CT a/p c/w Crohn's flare;  - pt reports her outpt GI is Dr. Rafat Crump at [facility], who had told her to inc her prednisone to 40 mg for Crohn's flare, and advised her to come to hospital if symptoms worsen. She reports she gets remicade , next one due on March 5  -C. diff and GI PCR negative  -inc Morphine  3 mg q4h prn severe pain, add toradol 15 mg q6h prn mod pain  -IV hydration  -GI consulted, c/w solumedrol 20mg q8h, advance diet to low residual diet   - trend LFT, if uptrend would obtain hepatology consult in house  -f/u GI recs   -Monitor for fever, Hold Abx for now   -VS q4h

## 2024-02-16 NOTE — DIETITIAN INITIAL EVALUATION ADULT - PERTINENT MEDS FT
MEDICATIONS  (STANDING):  influenza   Vaccine 0.5 milliLiter(s) IntraMuscular once  lactated ringers. 1000 milliLiter(s) (100 mL/Hr) IV Continuous <Continuous>  methylPREDNISolone sodium succinate Injectable 20 milliGRAM(s) IV Push every 8 hours  pantoprazole    Tablet 40 milliGRAM(s) Oral before breakfast  sodium chloride 0.9%. 1000 milliLiter(s) (80 mL/Hr) IV Continuous <Continuous>    MEDICATIONS  (PRN):  acetaminophen     Tablet .. 650 milliGRAM(s) Oral every 6 hours PRN Temp greater or equal to 38C (100.4F), Mild Pain (1 - 3)  aluminum hydroxide/magnesium hydroxide/simethicone Suspension 30 milliLiter(s) Oral every 4 hours PRN Dyspepsia  ketorolac   Injectable 15 milliGRAM(s) IV Push every 6 hours PRN Moderate Pain (4 - 6)  melatonin 3 milliGRAM(s) Oral at bedtime PRN Insomnia  morphine  - Injectable 3 milliGRAM(s) IV Push every 4 hours PRN Severe Pain (7 - 10)  ondansetron Injectable 4 milliGRAM(s) IV Push every 8 hours PRN Nausea and/or Vomiting

## 2024-02-16 NOTE — DIETITIAN INITIAL EVALUATION ADULT - PROBLEM SELECTOR PLAN 3
Rim enhancing mildly crenulated 2.7 cm likely involuting hemorrhagic cyst within right ovary    -Observe  -Pain control   -Monitor Hgb  -Hold Heparin for DVT ppx

## 2024-02-16 NOTE — DIETITIAN INITIAL EVALUATION ADULT - ORAL INTAKE PTA/DIET HISTORY
Patient seen for assessment. Reports consuming only liquids a few days PTA in setting of Crohn's flare.

## 2024-02-16 NOTE — PROGRESS NOTE ADULT - ASSESSMENT
23-year-old female with FHX of Crohn's (sister)  personal  history of Crohn's disease, diagnosed in 2023 a/w likely acute Crohn's flare requiring Morphine IV for pain control. Found to have hepatomegaly and likely hemorrhagic cyst within right ovary.    rd< from: CT Abdomen and Pelvis w/ IV Cont (02.14.24 @ 20:14) >  IMPRESSION:  Acute Crohn's terminal ileitis and proctocolitis as described    LIVER: Hepatomegaly approximately measures 21 cm. Mild focal fat   deposition adjacent to the falciform ligament.    REPRODUCTIVE ORGANS: Rim enhancing mildly crenulated 2.7 cm likely   involuting hemorrhagic cyst within right ovary

## 2024-02-16 NOTE — DIETITIAN INITIAL EVALUATION ADULT - ADD RECOMMEND
Continue diet as ordered. Consider multivitamin supplementation in setting of Crohn's. Nutrition department will provide Orgain BID (480 kcal, 32 g pro). Monitor PO intake.

## 2024-02-16 NOTE — PROGRESS NOTE ADULT - ASSESSMENT
22yo F w/ PMHx Crohns Dz on Remicade infusions q8 wks (first dose inpatient at time of dx 6/2023) due for next infusion March 2024) presents after having 5 days of severe diffuse cramping abdominal pain associated with many episodes, daily of blood and mucus streaked stool (7-8 BM/day). Pt reports feels like when she first presented with flare.    #Crohns Disease  *ESR 38, CRP 85.8  *Started PO Prednisone 40mg qD 2/12  *Due for next dose of Remicade March 2024  *C diff and GI PCR negative  #Leukocytosis  #Hepatomegaly + Elevated Alk phos    Recommendations  - trend daily CBC, CMP, PT/INR  - trend CRP daily  - c/w solumedrol 20 mg IV q8 hrs  - if patient continuing to improve will plan to transition to PO prednisone 40mg qD on 2/17  - c/w PO PPI qAM  - DVT ppx  - provide low residue diet as tolerated  - analgesia, fluids, and antiemetics as per primary team  - f/u with GI Dr. Crump after discharge    -hepatomegaly and elevated alk phos noted during admit in 6/2023 - plan at that time was for OP f/u (extensive hep w/u sent and normal)  -Alk phos currently downtrending and not as high as levels in June.  -if uptrend or new LFT derangements would obtain hepatology consult in house  -No hepatocellular injury pattern at this time, patient will need OP hepatology f/u after discharge  -please provide number for liver clinic to patient prior to DC - [phone]      All recommendations preliminary until note signed by service attending.    Thank you for involving us in the care of this patient. Please contact should any concern or questions arise.    Taz Grover MD   Gastroenterology/Hepatology Fellow PGY-5  Available on Microsoft Teams 7am - 5pm  [email or web address]    NON-URGENT CONSULTS:  Please email:  [email or web address]   OR  [email or web address]    After 5pm, please contact the on-call GI fellow. [phone]    AT NIGHT AND ON WEEKENDS:  Contact on-call GI fellow via answering service ([phone]) from 5pm-8am and on weekends/holidays

## 2024-02-17 LAB
ALBUMIN SERPL ELPH-MCNC: 3.3 G/DL — SIGNIFICANT CHANGE UP (ref 3.3–5)
ALP SERPL-CCNC: 126 U/L — HIGH (ref 40–120)
ALT FLD-CCNC: 32 U/L — SIGNIFICANT CHANGE UP (ref 4–33)
ANION GAP SERPL CALC-SCNC: 11 MMOL/L — SIGNIFICANT CHANGE UP (ref 7–14)
AST SERPL-CCNC: 16 U/L — SIGNIFICANT CHANGE UP (ref 4–32)
BASOPHILS # BLD AUTO: 0.04 K/UL — SIGNIFICANT CHANGE UP (ref 0–0.2)
BASOPHILS NFR BLD AUTO: 0.3 % — SIGNIFICANT CHANGE UP (ref 0–2)
BILIRUB DIRECT SERPL-MCNC: <0.2 MG/DL — SIGNIFICANT CHANGE UP (ref 0–0.3)
BILIRUB INDIRECT FLD-MCNC: SIGNIFICANT CHANGE UP MG/DL (ref 0–1)
BILIRUB SERPL-MCNC: <0.2 MG/DL — SIGNIFICANT CHANGE UP (ref 0.2–1.2)
BUN SERPL-MCNC: 8 MG/DL — SIGNIFICANT CHANGE UP (ref 7–23)
CALCIUM SERPL-MCNC: 9.1 MG/DL — SIGNIFICANT CHANGE UP (ref 8.4–10.5)
CHLORIDE SERPL-SCNC: 103 MMOL/L — SIGNIFICANT CHANGE UP (ref 98–107)
CO2 SERPL-SCNC: 24 MMOL/L — SIGNIFICANT CHANGE UP (ref 22–31)
CREAT SERPL-MCNC: 0.62 MG/DL — SIGNIFICANT CHANGE UP (ref 0.5–1.3)
CRP SERPL-MCNC: 86.7 MG/L — HIGH
EGFR: 127 ML/MIN/1.73M2 — SIGNIFICANT CHANGE UP
EOSINOPHIL # BLD AUTO: 0.01 K/UL — SIGNIFICANT CHANGE UP (ref 0–0.5)
EOSINOPHIL NFR BLD AUTO: 0.1 % — SIGNIFICANT CHANGE UP (ref 0–6)
GLUCOSE SERPL-MCNC: 92 MG/DL — SIGNIFICANT CHANGE UP (ref 70–99)
HCT VFR BLD CALC: 35.9 % — SIGNIFICANT CHANGE UP (ref 34.5–45)
HGB BLD-MCNC: 12.1 G/DL — SIGNIFICANT CHANGE UP (ref 11.5–15.5)
IANC: 9.08 K/UL — HIGH (ref 1.8–7.4)
IMM GRANULOCYTES NFR BLD AUTO: 1.3 % — HIGH (ref 0–0.9)
INR BLD: 1.2 RATIO — HIGH (ref 0.85–1.18)
LYMPHOCYTES # BLD AUTO: 1.65 K/UL — SIGNIFICANT CHANGE UP (ref 1–3.3)
LYMPHOCYTES # BLD AUTO: 13.2 % — SIGNIFICANT CHANGE UP (ref 13–44)
MAGNESIUM SERPL-MCNC: 2.1 MG/DL — SIGNIFICANT CHANGE UP (ref 1.6–2.6)
MCHC RBC-ENTMCNC: 30.3 PG — SIGNIFICANT CHANGE UP (ref 27–34)
MCHC RBC-ENTMCNC: 33.7 GM/DL — SIGNIFICANT CHANGE UP (ref 32–36)
MCV RBC AUTO: 89.8 FL — SIGNIFICANT CHANGE UP (ref 80–100)
MONOCYTES # BLD AUTO: 1.6 K/UL — HIGH (ref 0–0.9)
MONOCYTES NFR BLD AUTO: 12.8 % — SIGNIFICANT CHANGE UP (ref 2–14)
NEUTROPHILS # BLD AUTO: 9.08 K/UL — HIGH (ref 1.8–7.4)
NEUTROPHILS NFR BLD AUTO: 72.3 % — SIGNIFICANT CHANGE UP (ref 43–77)
NRBC # BLD: 0 /100 WBCS — SIGNIFICANT CHANGE UP (ref 0–0)
NRBC # FLD: 0 K/UL — SIGNIFICANT CHANGE UP (ref 0–0)
PHOSPHATE SERPL-MCNC: 3.1 MG/DL — SIGNIFICANT CHANGE UP (ref 2.5–4.5)
PLATELET # BLD AUTO: 391 K/UL — SIGNIFICANT CHANGE UP (ref 150–400)
POTASSIUM SERPL-MCNC: 4.2 MMOL/L — SIGNIFICANT CHANGE UP (ref 3.5–5.3)
POTASSIUM SERPL-SCNC: 4.2 MMOL/L — SIGNIFICANT CHANGE UP (ref 3.5–5.3)
PROT SERPL-MCNC: 6.9 G/DL — SIGNIFICANT CHANGE UP (ref 6–8.3)
PROTHROM AB SERPL-ACNC: 13.5 SEC — HIGH (ref 9.5–13)
RBC # BLD: 4 M/UL — SIGNIFICANT CHANGE UP (ref 3.8–5.2)
RBC # FLD: 13.2 % — SIGNIFICANT CHANGE UP (ref 10.3–14.5)
SODIUM SERPL-SCNC: 138 MMOL/L — SIGNIFICANT CHANGE UP (ref 135–145)
WBC # BLD: 12.54 K/UL — HIGH (ref 3.8–10.5)
WBC # FLD AUTO: 12.54 K/UL — HIGH (ref 3.8–10.5)

## 2024-02-17 PROCEDURE — 99232 SBSQ HOSP IP/OBS MODERATE 35: CPT | Mod: GC

## 2024-02-17 PROCEDURE — 99232 SBSQ HOSP IP/OBS MODERATE 35: CPT

## 2024-02-17 RX ORDER — PHENYLEPHRINE-SHARK LIVER OIL-MINERAL OIL-PETROLATUM RECTAL OINTMENT
1 OINTMENT (GRAM) RECTAL DAILY
Refills: 0 | Status: DISCONTINUED | OUTPATIENT
Start: 2024-02-17 | End: 2024-02-17

## 2024-02-17 RX ORDER — AER TRAVELER 0.5 G/1
1 SOLUTION RECTAL; TOPICAL THREE TIMES A DAY
Refills: 0 | Status: DISCONTINUED | OUTPATIENT
Start: 2024-02-17 | End: 2024-02-21

## 2024-02-17 RX ORDER — SODIUM CHLORIDE 9 MG/ML
1000 INJECTION, SOLUTION INTRAVENOUS
Refills: 0 | Status: DISCONTINUED | OUTPATIENT
Start: 2024-02-17 | End: 2024-02-19

## 2024-02-17 RX ADMIN — MORPHINE SULFATE 3 MILLIGRAM(S): 50 CAPSULE, EXTENDED RELEASE ORAL at 11:21

## 2024-02-17 RX ADMIN — Medication 15 MILLIGRAM(S): at 17:49

## 2024-02-17 RX ADMIN — SODIUM CHLORIDE 75 MILLILITER(S): 9 INJECTION, SOLUTION INTRAVENOUS at 15:13

## 2024-02-17 RX ADMIN — MORPHINE SULFATE 3 MILLIGRAM(S): 50 CAPSULE, EXTENDED RELEASE ORAL at 07:01

## 2024-02-17 RX ADMIN — MORPHINE SULFATE 3 MILLIGRAM(S): 50 CAPSULE, EXTENDED RELEASE ORAL at 02:40

## 2024-02-17 RX ADMIN — Medication 20 MILLIGRAM(S): at 21:26

## 2024-02-17 RX ADMIN — MORPHINE SULFATE 3 MILLIGRAM(S): 50 CAPSULE, EXTENDED RELEASE ORAL at 12:21

## 2024-02-17 RX ADMIN — PANTOPRAZOLE SODIUM 40 MILLIGRAM(S): 20 TABLET, DELAYED RELEASE ORAL at 05:40

## 2024-02-17 RX ADMIN — MORPHINE SULFATE 3 MILLIGRAM(S): 50 CAPSULE, EXTENDED RELEASE ORAL at 07:15

## 2024-02-17 RX ADMIN — Medication 20 MILLIGRAM(S): at 13:17

## 2024-02-17 RX ADMIN — Medication 20 MILLIGRAM(S): at 05:39

## 2024-02-17 RX ADMIN — Medication 15 MILLIGRAM(S): at 18:49

## 2024-02-17 NOTE — PROGRESS NOTE ADULT - PROBLEM SELECTOR PLAN 1
CT A/P: Terminal ileal wall thickening compatible with acute Crohn's ileitis. Liquid density stool with air fluid levels throughout colon in keeping with diarrheal illness. Colonic wall thickening with hyperenhancement involving the left half transverse colon, splenic flexure, descending and rectosigmoid colon in keeping with acute proctocolitis  WBC=16K, ESR=38 CRP=85  2/13/24 CT a/p c/w Crohn's flare;  - pt reports her outpt GI is Dr. Rafat Crump at [facility], who had told her to inc her prednisone to 40 mg for Crohn's flare, and advised her to come to hospital if symptoms worsen. She reports she gets remicade , next one due on March 5  -C. diff and GI PCR negative  -c/w Morphine  3 mg q4h prn severe pain,  toradol 15 mg q6h prn mod pain  -IVF LR  -GI following, daily CRP, c/w solumedrol 20mg q8h, possible transition to po prednisone on Sunday, low residual diet   - trend LFT, if uptrend would obtain hepatology consult in house  -f/u GI recs   -Monitor for fever, Hold Abx for now   -VS q4h

## 2024-02-17 NOTE — PROGRESS NOTE ADULT - ASSESSMENT
24yo F w/ PMHx Crohns Dz on Remicade infusions q8 wks (first dose inpatient at time of dx 6/2023) due for next infusion March 2024) presents after having 5 days of severe diffuse cramping abdominal pain associated with many episodes, daily of blood and mucus streaked stool (7-8 BM/day). Pt reports feels like when she first presented with flare.    #Crohns Disease  *ESR 38, CRP 85.8  *Started PO Prednisone 40mg qD 2/12  *Due for next dose of Remicade March 2024  *C diff and GI PCR negative  *Started on IV solumedrol with some improvement   #Leukocytosis, improving  #Hepatomegaly + Elevated Alk phos    Recommendations  - c/w solumedrol 20 mg IV q8 hrs  - trend daily CBC, CMP, PT/INR  - trend CRP daily  - will consider transition to PO prednisone Sunday pending clinical response   - c/w PO PPI qAM  - DVT ppx  - provide low residue diet as tolerated  - analgesia, fluids, and antiemetics as per primary team  - f/u with GI Dr. Crump after discharge    -hepatomegaly and elevated alk phos noted during admit in 6/2023 - plan at that time was for OP f/u (extensive hep w/u sent and normal)  -Alk phos currently downtrending and not as high as levels in June.  -if uptrend or new LFT derangements would obtain hepatology consult in house  -No hepatocellular injury pattern at this time, patient will need OP hepatology f/u after discharge  -please provide number for liver clinic to patient prior to DC - [phone]      Inder Ambrose MD  Gastroenterology/Hepatology Fellow   Available on Microsoft Teams 7am - 5pm  [email or web address]    NON-URGENT CONSULTS:  Please email:  [email or web address]   OR  [email or web address]    After 5pm, please contact the on-call GI fellow. [phone]    AT NIGHT AND ON WEEKENDS:  Contact on-call GI fellow via answering service ([phone]) from 5pm-8am and on weekends/holidays

## 2024-02-18 LAB
ALBUMIN SERPL ELPH-MCNC: 3.3 G/DL — SIGNIFICANT CHANGE UP (ref 3.3–5)
ALP SERPL-CCNC: 117 U/L — SIGNIFICANT CHANGE UP (ref 40–120)
ALT FLD-CCNC: 25 U/L — SIGNIFICANT CHANGE UP (ref 4–33)
ANION GAP SERPL CALC-SCNC: 9 MMOL/L — SIGNIFICANT CHANGE UP (ref 7–14)
AST SERPL-CCNC: 11 U/L — SIGNIFICANT CHANGE UP (ref 4–32)
BASOPHILS # BLD AUTO: 0.06 K/UL — SIGNIFICANT CHANGE UP (ref 0–0.2)
BASOPHILS NFR BLD AUTO: 0.4 % — SIGNIFICANT CHANGE UP (ref 0–2)
BILIRUB DIRECT SERPL-MCNC: <0.2 MG/DL — SIGNIFICANT CHANGE UP (ref 0–0.3)
BILIRUB INDIRECT FLD-MCNC: SIGNIFICANT CHANGE UP MG/DL (ref 0–1)
BILIRUB SERPL-MCNC: <0.2 MG/DL — SIGNIFICANT CHANGE UP (ref 0.2–1.2)
BUN SERPL-MCNC: 10 MG/DL — SIGNIFICANT CHANGE UP (ref 7–23)
CALCIUM SERPL-MCNC: 8.9 MG/DL — SIGNIFICANT CHANGE UP (ref 8.4–10.5)
CHLORIDE SERPL-SCNC: 101 MMOL/L — SIGNIFICANT CHANGE UP (ref 98–107)
CO2 SERPL-SCNC: 24 MMOL/L — SIGNIFICANT CHANGE UP (ref 22–31)
CREAT SERPL-MCNC: 0.68 MG/DL — SIGNIFICANT CHANGE UP (ref 0.5–1.3)
CRP SERPL-MCNC: 55.9 MG/L — HIGH
EGFR: 124 ML/MIN/1.73M2 — SIGNIFICANT CHANGE UP
EOSINOPHIL # BLD AUTO: 0 K/UL — SIGNIFICANT CHANGE UP (ref 0–0.5)
EOSINOPHIL NFR BLD AUTO: 0 % — SIGNIFICANT CHANGE UP (ref 0–6)
GLUCOSE SERPL-MCNC: 106 MG/DL — HIGH (ref 70–99)
HCT VFR BLD CALC: 36.9 % — SIGNIFICANT CHANGE UP (ref 34.5–45)
HGB BLD-MCNC: 12.5 G/DL — SIGNIFICANT CHANGE UP (ref 11.5–15.5)
IANC: 11.72 K/UL — HIGH (ref 1.8–7.4)
IMM GRANULOCYTES NFR BLD AUTO: 1.1 % — HIGH (ref 0–0.9)
INR BLD: 1.23 RATIO — HIGH (ref 0.85–1.18)
LYMPHOCYTES # BLD AUTO: 12.6 % — LOW (ref 13–44)
LYMPHOCYTES # BLD AUTO: 2 K/UL — SIGNIFICANT CHANGE UP (ref 1–3.3)
MAGNESIUM SERPL-MCNC: 2 MG/DL — SIGNIFICANT CHANGE UP (ref 1.6–2.6)
MCHC RBC-ENTMCNC: 29.8 PG — SIGNIFICANT CHANGE UP (ref 27–34)
MCHC RBC-ENTMCNC: 33.9 GM/DL — SIGNIFICANT CHANGE UP (ref 32–36)
MCV RBC AUTO: 88.1 FL — SIGNIFICANT CHANGE UP (ref 80–100)
MONOCYTES # BLD AUTO: 1.87 K/UL — HIGH (ref 0–0.9)
MONOCYTES NFR BLD AUTO: 11.8 % — SIGNIFICANT CHANGE UP (ref 2–14)
NEUTROPHILS # BLD AUTO: 11.72 K/UL — HIGH (ref 1.8–7.4)
NEUTROPHILS NFR BLD AUTO: 74.1 % — SIGNIFICANT CHANGE UP (ref 43–77)
NRBC # BLD: 0 /100 WBCS — SIGNIFICANT CHANGE UP (ref 0–0)
NRBC # FLD: 0 K/UL — SIGNIFICANT CHANGE UP (ref 0–0)
PHOSPHATE SERPL-MCNC: 3.8 MG/DL — SIGNIFICANT CHANGE UP (ref 2.5–4.5)
PLATELET # BLD AUTO: 403 K/UL — HIGH (ref 150–400)
POTASSIUM SERPL-MCNC: 4 MMOL/L — SIGNIFICANT CHANGE UP (ref 3.5–5.3)
POTASSIUM SERPL-SCNC: 4 MMOL/L — SIGNIFICANT CHANGE UP (ref 3.5–5.3)
PROT SERPL-MCNC: 6.8 G/DL — SIGNIFICANT CHANGE UP (ref 6–8.3)
PROTHROM AB SERPL-ACNC: 13.7 SEC — HIGH (ref 9.5–13)
RBC # BLD: 4.19 M/UL — SIGNIFICANT CHANGE UP (ref 3.8–5.2)
RBC # FLD: 13.3 % — SIGNIFICANT CHANGE UP (ref 10.3–14.5)
SODIUM SERPL-SCNC: 134 MMOL/L — LOW (ref 135–145)
WBC # BLD: 15.83 K/UL — HIGH (ref 3.8–10.5)
WBC # FLD AUTO: 15.83 K/UL — HIGH (ref 3.8–10.5)

## 2024-02-18 PROCEDURE — 99232 SBSQ HOSP IP/OBS MODERATE 35: CPT

## 2024-02-18 PROCEDURE — 99232 SBSQ HOSP IP/OBS MODERATE 35: CPT | Mod: GC

## 2024-02-18 RX ORDER — INFLIXIMAB-DYYB 120 MG/ML
340 INJECTION SUBCUTANEOUS ONCE
Refills: 0 | Status: COMPLETED | OUTPATIENT
Start: 2024-02-18 | End: 2024-02-18

## 2024-02-18 RX ORDER — BENZOCAINE AND MENTHOL 5; 1 G/100ML; G/100ML
1 LIQUID ORAL ONCE
Refills: 0 | Status: COMPLETED | OUTPATIENT
Start: 2024-02-18 | End: 2024-02-18

## 2024-02-18 RX ORDER — POLYETHYLENE GLYCOL 3350 17 G/17G
17 POWDER, FOR SOLUTION ORAL ONCE
Refills: 0 | Status: COMPLETED | OUTPATIENT
Start: 2024-02-18 | End: 2024-02-18

## 2024-02-18 RX ADMIN — MORPHINE SULFATE 3 MILLIGRAM(S): 50 CAPSULE, EXTENDED RELEASE ORAL at 19:56

## 2024-02-18 RX ADMIN — MORPHINE SULFATE 3 MILLIGRAM(S): 50 CAPSULE, EXTENDED RELEASE ORAL at 09:50

## 2024-02-18 RX ADMIN — MORPHINE SULFATE 3 MILLIGRAM(S): 50 CAPSULE, EXTENDED RELEASE ORAL at 14:30

## 2024-02-18 RX ADMIN — INFLIXIMAB-DYYB 125 MILLIGRAM(S): 120 INJECTION SUBCUTANEOUS at 23:17

## 2024-02-18 RX ADMIN — Medication 15 MILLIGRAM(S): at 18:36

## 2024-02-18 RX ADMIN — POLYETHYLENE GLYCOL 3350 17 GRAM(S): 17 POWDER, FOR SOLUTION ORAL at 21:14

## 2024-02-18 RX ADMIN — Medication 20 MILLIGRAM(S): at 13:59

## 2024-02-18 RX ADMIN — BENZOCAINE AND MENTHOL 1 LOZENGE: 5; 1 LIQUID ORAL at 05:17

## 2024-02-18 RX ADMIN — Medication 15 MILLIGRAM(S): at 17:51

## 2024-02-18 RX ADMIN — Medication 20 MILLIGRAM(S): at 05:09

## 2024-02-18 RX ADMIN — MORPHINE SULFATE 3 MILLIGRAM(S): 50 CAPSULE, EXTENDED RELEASE ORAL at 09:20

## 2024-02-18 RX ADMIN — PANTOPRAZOLE SODIUM 40 MILLIGRAM(S): 20 TABLET, DELAYED RELEASE ORAL at 05:08

## 2024-02-18 RX ADMIN — Medication 20 MILLIGRAM(S): at 21:23

## 2024-02-18 RX ADMIN — MORPHINE SULFATE 3 MILLIGRAM(S): 50 CAPSULE, EXTENDED RELEASE ORAL at 20:11

## 2024-02-18 RX ADMIN — SODIUM CHLORIDE 75 MILLILITER(S): 9 INJECTION, SOLUTION INTRAVENOUS at 17:48

## 2024-02-18 RX ADMIN — MORPHINE SULFATE 3 MILLIGRAM(S): 50 CAPSULE, EXTENDED RELEASE ORAL at 13:59

## 2024-02-18 RX ADMIN — Medication 15 MILLIGRAM(S): at 04:55

## 2024-02-18 NOTE — PROGRESS NOTE ADULT - PROBLEM SELECTOR PLAN 1
CT A/P: Terminal ileal wall thickening compatible with acute Crohn's ileitis. Liquid density stool with air fluid levels throughout colon in keeping with diarrheal illness. Colonic wall thickening with hyperenhancement involving the left half transverse colon, splenic flexure, descending and rectosigmoid colon in keeping with acute proctocolitis  WBC=16K, ESR=38 CRP=85  2/13/24 CT a/p c/w Crohn's flare;  - pt reports her outpt GI is Dr. Rafat Crump at [facility], who had told her to inc her prednisone to 40 mg for Crohn's flare, and advised her to come to hospital if symptoms worsen. She reports she gets remicade , next one due on March 5  -C. diff and GI PCR negative, CRP downtrending from 8.6.7 to 55.9  -c/w Morphine  3 mg q4h prn severe pain,  toradol 15 mg q6h prn mod pain  -IVF LR  -GI following, daily CRP, c/w solumedrol 20mg q8h, possible transition to po prednisone on Sunday, although pt reports her symptoms haven't really improved, low residual diet   - trend LFT, if uptrend would obtain hepatology consult in house  -f/u GI recs, pt wondering if steroid can be increased or remicade can be given earlier (scheduled to get remicade infusion on March 5)  -Monitor for fever, Hold Abx for now   -VS q4h

## 2024-02-18 NOTE — PROGRESS NOTE ADULT - ASSESSMENT
24yo F w/ PMHx Crohns Dz on Remicade infusions q8 wks (first dose inpatient at time of dx 6/2023) due for next infusion March 2024) presents after having 5 days of severe diffuse cramping abdominal pain associated with many episodes, daily of blood and mucus streaked stool (7-8 BM/day). Pt reports feels like when she first presented with flare.    #Crohns Disease  *ESR 38, CRP 85.8  *Started PO Prednisone 40mg qD 2/12  *Due for next dose of Remicade March 2024  *C diff and GI PCR negative  *Started on IV solumedrol with some improvement   *Quant gold and HBV testing negative in 6/2023    Recommendations  - Will re-dose with Remicade 5 mg/kg given persistent abdominal symptoms despite IV steroids   - c/w solumedrol 20 mg IV q8 hrs  - trend daily CBC, CMP, PT/INR  - trend CRP daily  - Daily PPI for GI prophylaxis   - DVT ppx  - low residue diet as tolerated  - analgesia, fluids, and antiemetics as per primary team    #Hepatomegaly  #Abnormal liver chemistry   -hepatomegaly and elevated alk phos noted during admit in 6/2023 - plan at that time was for OP f/u (extensive hep w/u sent and normal). MRCP without evidence of PSC  -Alk phos currently downtrending and not as high as levels in June.  -No hepatocellular injury pattern at this time, patient will need OP hepatology f/u after discharge  -please provide number for liver clinic to patient prior to DC - [phone]      Inder Ambrose MD  Gastroenterology/Hepatology Fellow   Available on Microsoft Teams 7am - 5pm  [email or web address]    NON-URGENT CONSULTS:  Please email:  [email or web address]   OR  [email or web address]    After 5pm, please contact the on-call GI fellow. [phone]    AT NIGHT AND ON WEEKENDS:  Contact on-call GI fellow via answering service ([phone]) from 5pm-8am and on weekends/holidays

## 2024-02-19 ENCOUNTER — TRANSCRIPTION ENCOUNTER (OUTPATIENT)
Age: 24
End: 2024-02-19

## 2024-02-19 LAB
ALBUMIN SERPL ELPH-MCNC: 3.2 G/DL — LOW (ref 3.3–5)
ALP SERPL-CCNC: 113 U/L — SIGNIFICANT CHANGE UP (ref 40–120)
ALT FLD-CCNC: 23 U/L — SIGNIFICANT CHANGE UP (ref 4–33)
ANION GAP SERPL CALC-SCNC: 11 MMOL/L — SIGNIFICANT CHANGE UP (ref 7–14)
AST SERPL-CCNC: 9 U/L — SIGNIFICANT CHANGE UP (ref 4–32)
BASOPHILS # BLD AUTO: 0.05 K/UL — SIGNIFICANT CHANGE UP (ref 0–0.2)
BASOPHILS NFR BLD AUTO: 0.4 % — SIGNIFICANT CHANGE UP (ref 0–2)
BILIRUB DIRECT SERPL-MCNC: <0.2 MG/DL — SIGNIFICANT CHANGE UP (ref 0–0.3)
BILIRUB INDIRECT FLD-MCNC: >0 MG/DL — SIGNIFICANT CHANGE UP (ref 0–1)
BILIRUB SERPL-MCNC: 0.2 MG/DL — SIGNIFICANT CHANGE UP (ref 0.2–1.2)
BUN SERPL-MCNC: 10 MG/DL — SIGNIFICANT CHANGE UP (ref 7–23)
CALCIUM SERPL-MCNC: 9.1 MG/DL — SIGNIFICANT CHANGE UP (ref 8.4–10.5)
CHLORIDE SERPL-SCNC: 101 MMOL/L — SIGNIFICANT CHANGE UP (ref 98–107)
CO2 SERPL-SCNC: 23 MMOL/L — SIGNIFICANT CHANGE UP (ref 22–31)
CREAT SERPL-MCNC: 0.64 MG/DL — SIGNIFICANT CHANGE UP (ref 0.5–1.3)
CRP SERPL-MCNC: 61.5 MG/L — HIGH
EGFR: 126 ML/MIN/1.73M2 — SIGNIFICANT CHANGE UP
EOSINOPHIL # BLD AUTO: 0 K/UL — SIGNIFICANT CHANGE UP (ref 0–0.5)
EOSINOPHIL NFR BLD AUTO: 0 % — SIGNIFICANT CHANGE UP (ref 0–6)
GLUCOSE SERPL-MCNC: 99 MG/DL — SIGNIFICANT CHANGE UP (ref 70–99)
HCT VFR BLD CALC: 37.1 % — SIGNIFICANT CHANGE UP (ref 34.5–45)
HGB BLD-MCNC: 12.2 G/DL — SIGNIFICANT CHANGE UP (ref 11.5–15.5)
IANC: 9.09 K/UL — HIGH (ref 1.8–7.4)
IMM GRANULOCYTES NFR BLD AUTO: 0.9 % — SIGNIFICANT CHANGE UP (ref 0–0.9)
INR BLD: 1.19 RATIO — HIGH (ref 0.85–1.18)
LYMPHOCYTES # BLD AUTO: 18.1 % — SIGNIFICANT CHANGE UP (ref 13–44)
LYMPHOCYTES # BLD AUTO: 2.4 K/UL — SIGNIFICANT CHANGE UP (ref 1–3.3)
MAGNESIUM SERPL-MCNC: 2 MG/DL — SIGNIFICANT CHANGE UP (ref 1.6–2.6)
MCHC RBC-ENTMCNC: 29.3 PG — SIGNIFICANT CHANGE UP (ref 27–34)
MCHC RBC-ENTMCNC: 32.9 GM/DL — SIGNIFICANT CHANGE UP (ref 32–36)
MCV RBC AUTO: 89 FL — SIGNIFICANT CHANGE UP (ref 80–100)
MONOCYTES # BLD AUTO: 1.57 K/UL — HIGH (ref 0–0.9)
MONOCYTES NFR BLD AUTO: 11.9 % — SIGNIFICANT CHANGE UP (ref 2–14)
NEUTROPHILS # BLD AUTO: 9.09 K/UL — HIGH (ref 1.8–7.4)
NEUTROPHILS NFR BLD AUTO: 68.7 % — SIGNIFICANT CHANGE UP (ref 43–77)
NRBC # BLD: 0 /100 WBCS — SIGNIFICANT CHANGE UP (ref 0–0)
NRBC # FLD: 0 K/UL — SIGNIFICANT CHANGE UP (ref 0–0)
PHOSPHATE SERPL-MCNC: 3.7 MG/DL — SIGNIFICANT CHANGE UP (ref 2.5–4.5)
PLATELET # BLD AUTO: 431 K/UL — HIGH (ref 150–400)
POTASSIUM SERPL-MCNC: 4 MMOL/L — SIGNIFICANT CHANGE UP (ref 3.5–5.3)
POTASSIUM SERPL-SCNC: 4 MMOL/L — SIGNIFICANT CHANGE UP (ref 3.5–5.3)
PROT SERPL-MCNC: 6.8 G/DL — SIGNIFICANT CHANGE UP (ref 6–8.3)
PROTHROM AB SERPL-ACNC: 13.4 SEC — HIGH (ref 9.5–13)
RBC # BLD: 4.17 M/UL — SIGNIFICANT CHANGE UP (ref 3.8–5.2)
RBC # FLD: 13.2 % — SIGNIFICANT CHANGE UP (ref 10.3–14.5)
SODIUM SERPL-SCNC: 135 MMOL/L — SIGNIFICANT CHANGE UP (ref 135–145)
WBC # BLD: 13.23 K/UL — HIGH (ref 3.8–10.5)
WBC # FLD AUTO: 13.23 K/UL — HIGH (ref 3.8–10.5)

## 2024-02-19 PROCEDURE — 99232 SBSQ HOSP IP/OBS MODERATE 35: CPT | Mod: GC

## 2024-02-19 PROCEDURE — 99232 SBSQ HOSP IP/OBS MODERATE 35: CPT

## 2024-02-19 RX ORDER — SODIUM CHLORIDE 9 MG/ML
1000 INJECTION, SOLUTION INTRAVENOUS
Refills: 0 | Status: DISCONTINUED | OUTPATIENT
Start: 2024-02-19 | End: 2024-02-21

## 2024-02-19 RX ADMIN — MORPHINE SULFATE 3 MILLIGRAM(S): 50 CAPSULE, EXTENDED RELEASE ORAL at 10:18

## 2024-02-19 RX ADMIN — MORPHINE SULFATE 3 MILLIGRAM(S): 50 CAPSULE, EXTENDED RELEASE ORAL at 01:32

## 2024-02-19 RX ADMIN — PANTOPRAZOLE SODIUM 40 MILLIGRAM(S): 20 TABLET, DELAYED RELEASE ORAL at 05:06

## 2024-02-19 RX ADMIN — Medication 650 MILLIGRAM(S): at 06:04

## 2024-02-19 RX ADMIN — MORPHINE SULFATE 3 MILLIGRAM(S): 50 CAPSULE, EXTENDED RELEASE ORAL at 01:52

## 2024-02-19 RX ADMIN — MORPHINE SULFATE 3 MILLIGRAM(S): 50 CAPSULE, EXTENDED RELEASE ORAL at 20:44

## 2024-02-19 RX ADMIN — Medication 20 MILLIGRAM(S): at 05:05

## 2024-02-19 RX ADMIN — Medication 20 MILLIGRAM(S): at 13:56

## 2024-02-19 RX ADMIN — MORPHINE SULFATE 3 MILLIGRAM(S): 50 CAPSULE, EXTENDED RELEASE ORAL at 10:03

## 2024-02-19 RX ADMIN — Medication 20 MILLIGRAM(S): at 21:04

## 2024-02-19 RX ADMIN — Medication 650 MILLIGRAM(S): at 05:04

## 2024-02-19 RX ADMIN — MORPHINE SULFATE 3 MILLIGRAM(S): 50 CAPSULE, EXTENDED RELEASE ORAL at 14:42

## 2024-02-19 RX ADMIN — MORPHINE SULFATE 3 MILLIGRAM(S): 50 CAPSULE, EXTENDED RELEASE ORAL at 14:57

## 2024-02-19 RX ADMIN — MORPHINE SULFATE 3 MILLIGRAM(S): 50 CAPSULE, EXTENDED RELEASE ORAL at 20:14

## 2024-02-19 RX ADMIN — SODIUM CHLORIDE 75 MILLILITER(S): 9 INJECTION, SOLUTION INTRAVENOUS at 19:03

## 2024-02-19 NOTE — PROGRESS NOTE ADULT - PROBLEM SELECTOR PLAN 1
CT A/P: Terminal ileal wall thickening compatible with acute Crohn's ileitis. Liquid density stool with air fluid levels throughout colon in keeping with diarrheal illness. Colonic wall thickening with hyperenhancement involving the left half transverse colon, splenic flexure, descending and rectosigmoid colon in keeping with acute proctocolitis  2/13/24 CT a/p c/w Crohn's flare;  - GI following, recs appreciated  - c/w solumedrol 20 mg q8h  - s/p IV Remicade (5mg/kg) on 2/18 (was scheduled on 3/5, but given earlier due to persistent symptoms)  - possible transition to po prednisone tomorrow if continued symptom improvement  -C. diff and GI PCR negative, CRP 86.7 to 55.9 --61.5  -c/w Morphine 3 mg q4h prn severe pain,  dc toradol  -IVF LR  - low residual diet  - trend LFT, no plan to consult hepatology at this time  - dispo: DC plan in 1-2 days when cleared by GI, outpt f/up with her GI Dr. Rafat Crump at [facility]

## 2024-02-19 NOTE — PROGRESS NOTE ADULT - ASSESSMENT
22yo F w/ PMHx Crohns Dz on Remicade infusions q8 wks (first dose inpatient at time of dx 6/2023) due for next infusion March 2024) presents after having 5 days of severe diffuse cramping abdominal pain associated with many episodes, daily of blood and mucus streaked stool (7-8 BM/day). Pt reports feels like when she first presented with flare.    #Crohns Disease flare  *ESR 38, CRP 85.8  *Started PO Prednisone 40mg qD 2/12  *Due for next maintenance dose of Remicade March 2024, 8 weeks after dose in January   *C diff and GI PCR negative  *Started on IV solumedrol with some improvement   *Quant gold and HBV testing negative in 6/2023  *Due to persistent pain on IV steroids, given early maintenance dose of Remicade on 2/18 (5 mg/kg) with improvement in pain    Recommendations  - c/w solumedrol 20 mg IV q8 hrs. If continues to improve Tuesday, will consider transition to PO   - trend daily CBC, CMP, PT/INR  - trend CRP daily  - Daily PPI for GI prophylaxis   - DVT ppx  - low residue diet as tolerated  - analgesia, fluids, and antiemetics as per primary team    #Hepatomegaly  #Abnormal liver chemistry   -hepatomegaly and elevated alk phos noted during admit in 6/2023 - plan at that time was for OP f/u (extensive hep w/u sent and normal). MRCP without evidence of PSC  -Alk phos currently downtrending and not as high as levels in June.  -No hepatocellular injury pattern at this time, patient will need OP hepatology f/u after discharge  -please provide number for liver clinic to patient prior to DC - [phone]    Inder Ambrose MD  Gastroenterology/Hepatology Fellow   Available on [employer] Teams 7am - 5pm  [email or web address]    NON-URGENT CONSULTS:  Please email:  [email or web address]   OR  [email or web address]    After 5pm, please contact the on-call GI fellow. [phone]    AT NIGHT AND ON WEEKENDS:  Contact on-call GI fellow via answering service ([phone]) from 5pm-8am and on weekends/holidays

## 2024-02-19 NOTE — DISCHARGE NOTE PROVIDER - NSFOLLOWUPCLINICS_GEN_ALL_ED_FT
[facility] Gastroenter[facility]  Gastroenterology  [address] [place] [address] [address]  [place] [address]  Phone: [phone]  Fax:

## 2024-02-19 NOTE — DISCHARGE NOTE PROVIDER - NSDCCPCAREPLAN_GEN_ALL_CORE_FT
PRINCIPAL DISCHARGE DIAGNOSIS  Diagnosis: Exacerbation of Crohn's disease  Assessment and Plan of Treatment: outpatient hepatology  GI Dr Poole     PRINCIPAL DISCHARGE DIAGNOSIS  Diagnosis: Exacerbation of Crohn's disease  Assessment and Plan of Treatment: Please continue prednisone 40mg daily until you follow-up with your GI physician within 1 week. Please follow-up with Dr. Crump at [facility]. Yulit also given the option to follow-up at GI clinic at [facility]. Continue oxycodone PRN 5mg q6h for pain x 1 week.   Please seek medical attention in an emergency room with any worsening or severe abdominal pain or diarrhea.      SECONDARY DISCHARGE DIAGNOSES  Diagnosis: Hemorrhagic ovarian cyst  Assessment and Plan of Treatment: Involuting on CT scan  Patient is asymptomatic without vaginal bleeding  Recommend outpatient follow-up with gyn     PRINCIPAL DISCHARGE DIAGNOSIS  Diagnosis: Exacerbation of Crohn's disease  Assessment and Plan of Treatment: Please continue prednisone 40mg daily until you follow-up with your GI physician within 1 week. Please follow-up with Dr. Crump at [facility]. Fabiotenjameel also given the option to follow-up at GI clinic at [facility]. Continue oxycodone PRN 5mg q6h for pain x 1 week.   Please seek medical attention in an emergency room with any worsening or severe abdominal pain or diarrhea.      SECONDARY DISCHARGE DIAGNOSES  Diagnosis: Hemorrhagic ovarian cyst  Assessment and Plan of Treatment: Involuting on CT scan  Patient is asymptomatic without vaginal bleeding  Recommend outpatient follow-up with gyn    Diagnosis: Transaminitis  Assessment and Plan of Treatment: Resolved  Please follow-up with hepatology as outpatient [phone]

## 2024-02-19 NOTE — DISCHARGE NOTE PROVIDER - NSDCFUSCHEDAPPT_GEN_ALL_CORE_FT
Dimas Villasenor  [facility]  GASTRO  [facility]  Scheduled Appointment: 03/15/2024

## 2024-02-19 NOTE — DISCHARGE NOTE PROVIDER - HOSPITAL COURSE
23-year-old female with FHX of Crohn's (sister)  personal  history of Crohn's disease, diagnosed in 2023 a/w likely acute Crohn's flare requiring Morphine IV for pain control. Found to have hepatomegaly and likely hemorrhagic cyst within right ovary.                   Problem/Plan - 1:  ·  Problem: Acute Crohn's disease.   ·  Plan: CT A/P: Terminal ileal wall thickening compatible with acute Crohn's ileitis. Liquid density stool with air fluid levels throughout colon in keeping with diarrheal illness. Colonic wall thickening with hyperenhancement involving the left half transverse colon, splenic flexure, descending and rectosigmoid colon in keeping with acute proctocolitis  2/13/24 CT a/p c/w Crohn's flare;  - GI following, recs appreciated  - c/w solumedrol 20 mg q8h  - s/p IV Remicade (5mg/kg) on 2/18 (was scheduled on 3/5, but given earlier due to persistent symptoms)  - possible transition to po prednisone tomorrow if continued symptom improvement  -C. diff and GI PCR negative, CRP 86.7 to 55.9 --61.5  -c/w Morphine 3 mg q4h prn severe pain,  dc toradol  -IVF LR  - low residual diet  - trend LFT, no plan to consult hepatology at this time  - dispo: DC plan in 1-2 days when cleared by GI, outpt f/up with her GI Dr. Rafat Crump at [facility].     Problem/Plan - 2:  ·  Problem: Hepatomegaly.   ·  Plan: CT A/P: Hepatomegaly approximately measures 21 cm. Mild focal fat deposition adjacent to the falciform ligament. Mild periportal edema.  Elevated AP and ALT     -f/u GI recs.     Problem/Plan - 3:  ·  Problem: Hemorrhagic cyst of ovary.   ·  Plan: Rim enhancing mildly crenulated 2.7 cm likely involuting hemorrhagic cyst within right ovary   23-year-old female with FHX of Crohn's (sister)  personal  history of Crohn's disease, diagnosed in 2023 a/w likely acute Crohn's flare requiring Morphine IV for pain control. Found to have hepatomegaly and likely hemorrhagic cyst within right ovary.     Problem/Plan - 1:  ·  Problem: Acute Crohn's disease.   ·  Plan: CT A/P: Terminal ileal wall thickening compatible with acute Crohn's ileitis. Liquid density stool with air fluid levels throughout colon in keeping with diarrheal illness. Colonic wall thickening with hyperenhancement involving the left half transverse colon, splenic flexure, descending and rectosigmoid colon in keeping with acute proctocolitis  2/13/24 CT a/p c/w Crohn's flare;  - GI following, recs appreciated  - c/w solumedrol 20 mg q8h  - s/p IV Remicade (5mg/kg) on 2/18 (was scheduled on 3/5, but given earlier due to persistent symptoms)  - possible transition to po prednisone tomorrow if continued symptom improvement  -C. diff and GI PCR negative, CRP 86.7 to 55.9 --61.5  -c/w Morphine 3 mg q4h prn severe pain,  dc toradol  -IVF LR  - low residual diet  - trend LFT, no plan to consult hepatology at this time  - dispo: DC plan in 1-2 days when cleared by GI, outpt f/up with her GI Dr. Rafat Crump      Problem/Plan - 2:  ·  Problem: Hepatomegaly.   ·  Plan: CT A/P: Hepatomegaly approximately measures 21 cm. Mild focal fat deposition adjacent to the falciform ligament. Mild periportal edema.  Elevated AP and ALT     -f/u GI recs.     Problem/Plan - 3:  ·  Problem: Hemorrhagic cyst of ovary.   ·  Plan: Rim enhancing mildly crenulated 2.7 cm likely involuting hemorrhagic cyst within right ovary   24 yo F PMH Crohn's disease on Remicade infusions q8 wks (first dose inpatient at time of dx 6/2023) presents after having 5 days of severe diffuse cramping abdominal pain associated with many episodes, daily of blood and mucus streaked stool (7-8 BM/day) c/f Crohn's flare. CT A/P: Terminal ileal wall thickening compatible with acute Crohn's ileitis. Liquid density stool with air fluid levels throughout colon in keeping with diarrheal illness. Colonic wall thickening with hyperenhancement involving the left half transverse colon, splenic flexure, descending and rectosigmoid colon in keeping with acute proctocolitis. GI evaluated and patient is s/p solumedrol 20 mg q8h and was transitioned to PO prednisone 40mg daily on 2/21. Patient is also s/p IV Remicade (5mg/kg) on 2/18 (was scheduled on 3/5, but given earlier due to persistent symptoms). Patient was treated with IV morphine which was weaned and patient was transitioned to PO oxy PRN - abdominal pain improved. Patient will follow-up as outpatient with GI Dr. Rafat Crump at [facility] - also given the option to follow-up at [facility] GI clinic. Patient will likely need intensification of remicade regimen to be coordinated as outpatient. On CT patient was found to have a rim enhancing mildly crenulated 2.7 cm likely involuting hemorrhagic cyst within right ovary - patient denies any vaginal bleeding.

## 2024-02-19 NOTE — DISCHARGE NOTE PROVIDER - CARE PROVIDER_API CALL
Rafat Crump  Gastroenterology  [address] [place] [id number]  Phone: [phone]  Fax: [phone]  Follow Up Time: 1 week

## 2024-02-19 NOTE — DISCHARGE NOTE PROVIDER - NSDCMRMEDTOKEN_GEN_ALL_CORE_FT
acetaminophen 325 mg oral tablet: 2 tab(s) orally every 6 hours As needed Temp greater or equal to 38C (100.4F), Mild Pain (1 - 3)  melatonin 3 mg oral tablet: 1 tab(s) orally once a day (at bedtime) As needed Insomnia  oxyCODONE 5 mg oral tablet: 1 tab(s) orally every 6 hours as needed for Severe Pain (7 - 10) MDD: 4 tablets (20mg)  pantoprazole 40 mg oral delayed release tablet: 1 tab(s) orally once a day (before a meal)  predniSONE 20 mg oral tablet: 2 tab(s) orally once a day Continue this dose until seen by outpatient GI provider  witch hazel 50% topical pad: 1 Apply topically to affected area 3 times a day As needed Hemorrhoidal discomfort

## 2024-02-19 NOTE — DISCHARGE NOTE PROVIDER - NSDCFUADDAPPT_GEN_ALL_CORE_FT
Please arrange for follow-up with a gastroenterologist as soon as possible (No more than 21 days from date of discharge). You are to take your prednisone daily up until that time, when they can determine if you will need a new medication or a different dose.     Please follow up with your primary care provider in 1-2 weeks following discharge from the hospital for continued monitoring and management.

## 2024-02-20 LAB
ALBUMIN SERPL ELPH-MCNC: 3.3 G/DL — SIGNIFICANT CHANGE UP (ref 3.3–5)
ALP SERPL-CCNC: 115 U/L — SIGNIFICANT CHANGE UP (ref 40–120)
ALT FLD-CCNC: 23 U/L — SIGNIFICANT CHANGE UP (ref 4–33)
ANION GAP SERPL CALC-SCNC: 9 MMOL/L — SIGNIFICANT CHANGE UP (ref 7–14)
AST SERPL-CCNC: 12 U/L — SIGNIFICANT CHANGE UP (ref 4–32)
BASOPHILS # BLD AUTO: 0.03 K/UL — SIGNIFICANT CHANGE UP (ref 0–0.2)
BASOPHILS NFR BLD AUTO: 0.3 % — SIGNIFICANT CHANGE UP (ref 0–2)
BILIRUB SERPL-MCNC: <0.2 MG/DL — SIGNIFICANT CHANGE UP (ref 0.2–1.2)
BUN SERPL-MCNC: 10 MG/DL — SIGNIFICANT CHANGE UP (ref 7–23)
CALCIUM SERPL-MCNC: 9.2 MG/DL — SIGNIFICANT CHANGE UP (ref 8.4–10.5)
CHLORIDE SERPL-SCNC: 103 MMOL/L — SIGNIFICANT CHANGE UP (ref 98–107)
CO2 SERPL-SCNC: 25 MMOL/L — SIGNIFICANT CHANGE UP (ref 22–31)
CREAT SERPL-MCNC: 0.57 MG/DL — SIGNIFICANT CHANGE UP (ref 0.5–1.3)
CRP SERPL-MCNC: 56.5 MG/L — HIGH
EGFR: 129 ML/MIN/1.73M2 — SIGNIFICANT CHANGE UP
EOSINOPHIL # BLD AUTO: 0 K/UL — SIGNIFICANT CHANGE UP (ref 0–0.5)
EOSINOPHIL NFR BLD AUTO: 0 % — SIGNIFICANT CHANGE UP (ref 0–6)
GLUCOSE SERPL-MCNC: 110 MG/DL — HIGH (ref 70–99)
HCT VFR BLD CALC: 38.8 % — SIGNIFICANT CHANGE UP (ref 34.5–45)
HGB BLD-MCNC: 13 G/DL — SIGNIFICANT CHANGE UP (ref 11.5–15.5)
IANC: 5.64 K/UL — SIGNIFICANT CHANGE UP (ref 1.8–7.4)
IMM GRANULOCYTES NFR BLD AUTO: 1.2 % — HIGH (ref 0–0.9)
INR BLD: 1.15 RATIO — SIGNIFICANT CHANGE UP (ref 0.85–1.18)
LYMPHOCYTES # BLD AUTO: 2.78 K/UL — SIGNIFICANT CHANGE UP (ref 1–3.3)
LYMPHOCYTES # BLD AUTO: 28.3 % — SIGNIFICANT CHANGE UP (ref 13–44)
MAGNESIUM SERPL-MCNC: 2 MG/DL — SIGNIFICANT CHANGE UP (ref 1.6–2.6)
MCHC RBC-ENTMCNC: 29.5 PG — SIGNIFICANT CHANGE UP (ref 27–34)
MCHC RBC-ENTMCNC: 33.5 GM/DL — SIGNIFICANT CHANGE UP (ref 32–36)
MCV RBC AUTO: 88.2 FL — SIGNIFICANT CHANGE UP (ref 80–100)
MONOCYTES # BLD AUTO: 1.27 K/UL — HIGH (ref 0–0.9)
MONOCYTES NFR BLD AUTO: 12.9 % — SIGNIFICANT CHANGE UP (ref 2–14)
NEUTROPHILS # BLD AUTO: 5.64 K/UL — SIGNIFICANT CHANGE UP (ref 1.8–7.4)
NEUTROPHILS NFR BLD AUTO: 57.3 % — SIGNIFICANT CHANGE UP (ref 43–77)
NRBC # BLD: 0 /100 WBCS — SIGNIFICANT CHANGE UP (ref 0–0)
NRBC # FLD: 0 K/UL — SIGNIFICANT CHANGE UP (ref 0–0)
PHOSPHATE SERPL-MCNC: 3.3 MG/DL — SIGNIFICANT CHANGE UP (ref 2.5–4.5)
PLATELET # BLD AUTO: 430 K/UL — HIGH (ref 150–400)
POTASSIUM SERPL-MCNC: 4 MMOL/L — SIGNIFICANT CHANGE UP (ref 3.5–5.3)
POTASSIUM SERPL-SCNC: 4 MMOL/L — SIGNIFICANT CHANGE UP (ref 3.5–5.3)
PROT SERPL-MCNC: 6.7 G/DL — SIGNIFICANT CHANGE UP (ref 6–8.3)
PROTHROM AB SERPL-ACNC: 12.8 SEC — SIGNIFICANT CHANGE UP (ref 9.5–13)
RBC # BLD: 4.4 M/UL — SIGNIFICANT CHANGE UP (ref 3.8–5.2)
RBC # FLD: 13.1 % — SIGNIFICANT CHANGE UP (ref 10.3–14.5)
SODIUM SERPL-SCNC: 137 MMOL/L — SIGNIFICANT CHANGE UP (ref 135–145)
WBC # BLD: 9.84 K/UL — SIGNIFICANT CHANGE UP (ref 3.8–10.5)
WBC # FLD AUTO: 9.84 K/UL — SIGNIFICANT CHANGE UP (ref 3.8–10.5)

## 2024-02-20 PROCEDURE — 99233 SBSQ HOSP IP/OBS HIGH 50: CPT

## 2024-02-20 PROCEDURE — 99232 SBSQ HOSP IP/OBS MODERATE 35: CPT | Mod: GC

## 2024-02-20 RX ORDER — MORPHINE SULFATE 50 MG/1
2 CAPSULE, EXTENDED RELEASE ORAL EVERY 6 HOURS
Refills: 0 | Status: DISCONTINUED | OUTPATIENT
Start: 2024-02-20 | End: 2024-02-21

## 2024-02-20 RX ADMIN — MORPHINE SULFATE 2 MILLIGRAM(S): 50 CAPSULE, EXTENDED RELEASE ORAL at 13:22

## 2024-02-20 RX ADMIN — MORPHINE SULFATE 3 MILLIGRAM(S): 50 CAPSULE, EXTENDED RELEASE ORAL at 04:44

## 2024-02-20 RX ADMIN — PANTOPRAZOLE SODIUM 40 MILLIGRAM(S): 20 TABLET, DELAYED RELEASE ORAL at 05:17

## 2024-02-20 RX ADMIN — Medication 20 MILLIGRAM(S): at 05:17

## 2024-02-20 RX ADMIN — MORPHINE SULFATE 3 MILLIGRAM(S): 50 CAPSULE, EXTENDED RELEASE ORAL at 01:05

## 2024-02-20 RX ADMIN — MORPHINE SULFATE 3 MILLIGRAM(S): 50 CAPSULE, EXTENDED RELEASE ORAL at 08:58

## 2024-02-20 RX ADMIN — SODIUM CHLORIDE 75 MILLILITER(S): 9 INJECTION, SOLUTION INTRAVENOUS at 05:16

## 2024-02-20 RX ADMIN — MORPHINE SULFATE 2 MILLIGRAM(S): 50 CAPSULE, EXTENDED RELEASE ORAL at 14:22

## 2024-02-20 RX ADMIN — MORPHINE SULFATE 3 MILLIGRAM(S): 50 CAPSULE, EXTENDED RELEASE ORAL at 09:58

## 2024-02-20 RX ADMIN — Medication 20 MILLIGRAM(S): at 21:08

## 2024-02-20 RX ADMIN — MORPHINE SULFATE 3 MILLIGRAM(S): 50 CAPSULE, EXTENDED RELEASE ORAL at 00:32

## 2024-02-20 RX ADMIN — Medication 20 MILLIGRAM(S): at 13:22

## 2024-02-20 RX ADMIN — MORPHINE SULFATE 2 MILLIGRAM(S): 50 CAPSULE, EXTENDED RELEASE ORAL at 20:19

## 2024-02-20 RX ADMIN — MORPHINE SULFATE 3 MILLIGRAM(S): 50 CAPSULE, EXTENDED RELEASE ORAL at 05:14

## 2024-02-20 RX ADMIN — MORPHINE SULFATE 2 MILLIGRAM(S): 50 CAPSULE, EXTENDED RELEASE ORAL at 20:39

## 2024-02-20 NOTE — PROGRESS NOTE ADULT - ASSESSMENT
22yo F w/ PMHx Crohns Dz on Remicade infusions q8 wks (first dose inpatient at time of dx 6/2023) due for next infusion March 2024) presents after having 5 days of severe diffuse cramping abdominal pain associated with many episodes, daily of blood and mucus streaked stool (7-8 BM/day). Pt reports feels like when she first presented with flare.    #Crohns Disease flare  *ESR 38, CRP 85.8  *Started PO Prednisone 40mg qD 2/12 at home  *Due for next maintenance dose of Remicade March 2024, 8 weeks after dose in January   *C diff and GI PCR negative  *Started on IV solumedrol with some improvement   *Quant gold and HBV testing negative in 6/2023  *Due to persistent pain on IV steroids, given early maintenance dose of Remicade on 2/18 (5 mg/kg) with improvement in pain    Recommendations  - please transition to PO prednisone 40mg qD tomorrow 2/21  - please de-escalate pain regimen as able  - trend daily CBC, CMP, PT/INR  - trend CRP daily  - Daily PPI for GI prophylaxis   - DVT ppx  - analgesia, fluids, and antiemetics as per primary team  - patient would like to have option to f/u with GI at [facility] - Please provide patient with Gastroenterology Clinic to make appointment; [phone] ([facility] at [address] [place] [address])   - will likely need intensification of remicade regimen to be coordinated as outpatient    #Hepatomegaly  #Abnormal liver chemistry   -hepatomegaly and elevated alk phos noted during admit in 6/2023 - plan at that time was for OP f/u (extensive hep w/u sent and normal). MRCP without evidence of PSC  -Alk phos currently downtrending and not as high as levels in June.  -No hepatocellular injury pattern at this time, patient will need OP hepatology f/u after discharge  -please provide number for liver clinic to patient prior to DC - [phone]      All recommendations preliminary until note signed by service attending.    Thank you for involving us in the care of this patient. Please contact should any concern or questions arise.    Taz Grover MD   Gastroenterology/Hepatology Fellow PGY-5  Available on [employer] Teams 7am - 5pm  [email or web address]    NON-URGENT CONSULTS:  Please email:  [email or web address]   OR  [email or web address]    After 5pm, please contact the on-call GI fellow. [phone]    AT NIGHT AND ON WEEKENDS:  Contact on-call GI fellow via answering service ([phone]) from 5pm-8am and on weekends/holidays

## 2024-02-20 NOTE — PROGRESS NOTE ADULT - ASSESSMENT
24 yo F PMH Crohn's disease on Remicade infusions q8 wks (first dose inpatient at time of dx 6/2023) presents after having 5 days of severe diffuse cramping abdominal pain associated with many episodes, daily of blood and mucus streaked stool (7-8 BM/day) c/f Crohn's flare.

## 2024-02-20 NOTE — PROGRESS NOTE ADULT - PROBLEM SELECTOR PROBLEM 1
Acute Crohn's disease

## 2024-02-20 NOTE — PROGRESS NOTE ADULT - PROBLEM SELECTOR PLAN 2
CT A/P: Hepatomegaly approximately measures 21 cm. Mild focal fat deposition adjacent to the falciform ligament. Mild periportal edema.  Elevated AP and ALT   Appreciate GI recommendations
CT A/P: Hepatomegaly approximately measures 21 cm. Mild focal fat deposition adjacent to the falciform ligament. Mild periportal edema.  Elevated ALPH and ALT     -GI/Hepatology c/s
CT A/P: Hepatomegaly approximately measures 21 cm. Mild focal fat deposition adjacent to the falciform ligament. Mild periportal edema.  Elevated ALPH and ALT     -f/u GI recs
CT A/P: Hepatomegaly approximately measures 21 cm. Mild focal fat deposition adjacent to the falciform ligament. Mild periportal edema.  Elevated AP and ALT     -f/u GI recs
CT A/P: Hepatomegaly approximately measures 21 cm. Mild focal fat deposition adjacent to the falciform ligament. Mild periportal edema.  Elevated ALPH and ALT     -f/u GI recs
CT A/P: Hepatomegaly approximately measures 21 cm. Mild focal fat deposition adjacent to the falciform ligament. Mild periportal edema.  Elevated AP and ALT     -f/u GI recs

## 2024-02-20 NOTE — PROGRESS NOTE ADULT - PROBLEM SELECTOR PLAN 3
Rim enhancing mildly crenulated 2.7 cm likely involuting hemorrhagic cyst within right ovary    -Observe  -Pain control   -Monitor Hgb  -Hold Heparin for DVT ppx
Rim enhancing mildly crenulated 2.7 cm likely involuting hemorrhagic cyst within right ovary    -Observe  -Pain control   -Monitor Hgb  -Hold Heparin for DVT ppx
Rim enhancing mildly crenulated 2.7 cm likely involuting hemorrhagic cyst within right ovary on CT  Patient denies any vaginal bleeding  Monitor CBC
Rim enhancing mildly crenulated 2.7 cm likely involuting hemorrhagic cyst within right ovary    -Observe  -Pain control   -Monitor Hgb  -Hold Heparin for DVT ppx

## 2024-02-20 NOTE — PROGRESS NOTE ADULT - PROBLEM SELECTOR PLAN 4
SCDs  Hold Heparin sq given hemorrhagic cyst within right ovary
DVT ppx: SCD

## 2024-02-20 NOTE — PROGRESS NOTE ADULT - PROBLEM SELECTOR PROBLEM 3
Hemorrhagic cyst of ovary

## 2024-02-20 NOTE — PROGRESS NOTE ADULT - PROBLEM SELECTOR PLAN 1
CT A/P: Terminal ileal wall thickening compatible with acute Crohn's ileitis. Liquid density stool with air fluid levels throughout colon in keeping with diarrheal illness. Colonic wall thickening with hyperenhancement involving the left half transverse colon, splenic flexure, descending and rectosigmoid colon in keeping with acute proctocolitis  2/13/24 CT a/p c/w Crohn's flare;  - GI following, recs appreciated  - c/w solumedrol 20 mg q8h, plan to transition to PO prednisone 2/21 per GI  - s/p IV Remicade (5mg/kg) on 2/18 (was scheduled on 3/5, but given earlier due to persistent symptoms)  - C. diff and GI PCR negative, CRP 86.7 to 55.9 --61.5  - Pain control: will decrease morphine to 2mg q6h PRN, continue tylenol PRN  - IVF LR  - low residual diet  - dispo: DC plan in 1-2 days when cleared by GI, will need outpatient f/u with her GI Dr. Rafat Crump at [facility]  -  will likely need intensification of remicade regimen to be coordinated as outpatient

## 2024-02-21 ENCOUNTER — TRANSCRIPTION ENCOUNTER (OUTPATIENT)
Age: 24
End: 2024-02-21

## 2024-02-21 VITALS
DIASTOLIC BLOOD PRESSURE: 68 MMHG | TEMPERATURE: 98 F | RESPIRATION RATE: 18 BRPM | HEART RATE: 65 BPM | SYSTOLIC BLOOD PRESSURE: 128 MMHG | OXYGEN SATURATION: 99 %

## 2024-02-21 PROBLEM — K50.90 CROHN'S DISEASE, UNSPECIFIED, WITHOUT COMPLICATIONS: Chronic | Status: ACTIVE | Noted: 2024-02-14

## 2024-02-21 LAB
ALBUMIN SERPL ELPH-MCNC: 3.6 G/DL — SIGNIFICANT CHANGE UP (ref 3.3–5)
ALP SERPL-CCNC: 114 U/L — SIGNIFICANT CHANGE UP (ref 40–120)
ALT FLD-CCNC: 25 U/L — SIGNIFICANT CHANGE UP (ref 4–33)
ANION GAP SERPL CALC-SCNC: 13 MMOL/L — SIGNIFICANT CHANGE UP (ref 7–14)
AST SERPL-CCNC: 15 U/L — SIGNIFICANT CHANGE UP (ref 4–32)
BILIRUB SERPL-MCNC: 0.2 MG/DL — SIGNIFICANT CHANGE UP (ref 0.2–1.2)
BUN SERPL-MCNC: 12 MG/DL — SIGNIFICANT CHANGE UP (ref 7–23)
CALCIUM SERPL-MCNC: 8.8 MG/DL — SIGNIFICANT CHANGE UP (ref 8.4–10.5)
CHLORIDE SERPL-SCNC: 99 MMOL/L — SIGNIFICANT CHANGE UP (ref 98–107)
CO2 SERPL-SCNC: 24 MMOL/L — SIGNIFICANT CHANGE UP (ref 22–31)
CREAT SERPL-MCNC: 0.64 MG/DL — SIGNIFICANT CHANGE UP (ref 0.5–1.3)
CRP SERPL-MCNC: 31 MG/L — HIGH
EGFR: 126 ML/MIN/1.73M2 — SIGNIFICANT CHANGE UP
GLUCOSE SERPL-MCNC: 176 MG/DL — HIGH (ref 70–99)
HCT VFR BLD CALC: 42.2 % — SIGNIFICANT CHANGE UP (ref 34.5–45)
HGB BLD-MCNC: 14 G/DL — SIGNIFICANT CHANGE UP (ref 11.5–15.5)
INR BLD: 1.17 RATIO — SIGNIFICANT CHANGE UP (ref 0.85–1.18)
MCHC RBC-ENTMCNC: 29.2 PG — SIGNIFICANT CHANGE UP (ref 27–34)
MCHC RBC-ENTMCNC: 33.2 GM/DL — SIGNIFICANT CHANGE UP (ref 32–36)
MCV RBC AUTO: 88.1 FL — SIGNIFICANT CHANGE UP (ref 80–100)
NRBC # BLD: 0 /100 WBCS — SIGNIFICANT CHANGE UP (ref 0–0)
NRBC # FLD: 0 K/UL — SIGNIFICANT CHANGE UP (ref 0–0)
PLATELET # BLD AUTO: 407 K/UL — HIGH (ref 150–400)
POTASSIUM SERPL-MCNC: 3.9 MMOL/L — SIGNIFICANT CHANGE UP (ref 3.5–5.3)
POTASSIUM SERPL-SCNC: 3.9 MMOL/L — SIGNIFICANT CHANGE UP (ref 3.5–5.3)
PROT SERPL-MCNC: 7.4 G/DL — SIGNIFICANT CHANGE UP (ref 6–8.3)
PROTHROM AB SERPL-ACNC: 13.2 SEC — HIGH (ref 9.5–13)
RBC # BLD: 4.79 M/UL — SIGNIFICANT CHANGE UP (ref 3.8–5.2)
RBC # FLD: 13.2 % — SIGNIFICANT CHANGE UP (ref 10.3–14.5)
SODIUM SERPL-SCNC: 136 MMOL/L — SIGNIFICANT CHANGE UP (ref 135–145)
VIT B12 SERPL-MCNC: 943 PG/ML — HIGH (ref 200–900)
VIT D25+D1,25 OH+D1,25 PNL SERPL-MCNC: 61.7 PG/ML — SIGNIFICANT CHANGE UP (ref 19.9–79.3)
WBC # BLD: 10.05 K/UL — SIGNIFICANT CHANGE UP (ref 3.8–10.5)
WBC # FLD AUTO: 10.05 K/UL — SIGNIFICANT CHANGE UP (ref 3.8–10.5)

## 2024-02-21 PROCEDURE — 99239 HOSP IP/OBS DSCHRG MGMT >30: CPT

## 2024-02-21 PROCEDURE — 99232 SBSQ HOSP IP/OBS MODERATE 35: CPT | Mod: GC

## 2024-02-21 RX ORDER — PANTOPRAZOLE SODIUM 20 MG/1
1 TABLET, DELAYED RELEASE ORAL
Qty: 0 | Refills: 0 | DISCHARGE
Start: 2024-02-21

## 2024-02-21 RX ORDER — LANOLIN ALCOHOL/MO/W.PET/CERES
1 CREAM (GRAM) TOPICAL
Qty: 0 | Refills: 0 | DISCHARGE
Start: 2024-02-21

## 2024-02-21 RX ORDER — OXYCODONE HYDROCHLORIDE 5 MG/1
5 TABLET ORAL EVERY 6 HOURS
Refills: 0 | Status: DISCONTINUED | OUTPATIENT
Start: 2024-02-21 | End: 2024-02-21

## 2024-02-21 RX ORDER — OXYCODONE HYDROCHLORIDE 5 MG/1
1 TABLET ORAL
Qty: 12 | Refills: 0
Start: 2024-02-21 | End: 2024-02-23

## 2024-02-21 RX ORDER — AER TRAVELER 0.5 G/1
1 SOLUTION RECTAL; TOPICAL
Qty: 0 | Refills: 0 | DISCHARGE
Start: 2024-02-21

## 2024-02-21 RX ORDER — ACETAMINOPHEN 500 MG
2 TABLET ORAL
Qty: 0 | Refills: 0 | DISCHARGE
Start: 2024-02-21

## 2024-02-21 RX ADMIN — Medication 40 MILLIGRAM(S): at 05:05

## 2024-02-21 RX ADMIN — MORPHINE SULFATE 2 MILLIGRAM(S): 50 CAPSULE, EXTENDED RELEASE ORAL at 05:07

## 2024-02-21 RX ADMIN — PANTOPRAZOLE SODIUM 40 MILLIGRAM(S): 20 TABLET, DELAYED RELEASE ORAL at 05:05

## 2024-02-21 NOTE — PROGRESS NOTE ADULT - REASON FOR ADMISSION
Abdominal pain x 5 days, diarrhea

## 2024-02-21 NOTE — DISCHARGE NOTE NURSING/CASE MANAGEMENT/SOCIAL WORK - NSDCPEFALRISK_GEN_ALL_CORE
For information on Fall & Injury Prevention, visit: [email or web address] OR  [email or web address] OR  https://www.cdc.gov/steadi/patient.html

## 2024-02-21 NOTE — PROGRESS NOTE ADULT - SUBJECTIVE AND OBJECTIVE BOX
Interval Events:   Dose with Remicade 5 mg/kg in the evening. This AM pt reports feeling improvement in abdominal pain. Still with some abd pain during defecation. Stools is small, semi-formed and with mucus  vitals, exam and labs are stable    Hospital Medications:  acetaminophen     Tablet .. 650 milliGRAM(s) Oral every 6 hours PRN  aluminum hydroxide/magnesium hydroxide/simethicone Suspension 30 milliLiter(s) Oral every 4 hours PRN  influenza   Vaccine 0.5 milliLiter(s) IntraMuscular once  ketorolac   Injectable 15 milliGRAM(s) IV Push every 6 hours PRN  lactated ringers. 1000 milliLiter(s) IV Continuous <Continuous>  melatonin 3 milliGRAM(s) Oral at bedtime PRN  methylPREDNISolone sodium succinate Injectable 20 milliGRAM(s) IV Push every 8 hours  morphine  - Injectable 3 milliGRAM(s) IV Push every 4 hours PRN  ondansetron Injectable 4 milliGRAM(s) IV Push every 8 hours PRN  pantoprazole    Tablet 40 milliGRAM(s) Oral before breakfast  witch hazel Pads 1 Application(s) Topical three times a day PRN    ROS: See above.    PHYSICAL EXAM:   Vital Signs:  Vital Signs Last 24 Hrs  T(C): 36.4 (19 Feb 2024 10:02), Max: 37.1 (18 Feb 2024 17:20)  T(F): 97.6 (19 Feb 2024 10:02), Max: 98.7 (18 Feb 2024 17:20)  HR: 59 (19 Feb 2024 10:02) (56 - 61)  BP: 135/76 (19 Feb 2024 10:02) (114/59 - 135/76)  BP(mean): --  RR: 17 (19 Feb 2024 10:02) (16 - 17)  SpO2: 100% (19 Feb 2024 10:02) (100% - 100%)    Parameters below as of 19 Feb 2024 10:02  Patient On (Oxygen Delivery Method): room air      Daily     GENERAL:  NAD, resting comfortably in bed  HEENT:  sclera anicteric  CHEST:  Normal Effort  HEART:  HDS  ABDOMEN:  Soft, mild tender to palpation, non-distended  SKIN:  Warm & Dry. No jaundice  NEURO:  Alert, conversant, no focal deficit    LABS:                        12.2   13.23 )-----------( 431      ( 19 Feb 2024 05:46 )             37.1     Mean Cell Volume: 89.0 fL (02-19-24 @ 05:46)    02-19    135  |  101  |  10  ----------------------------<  99  4.0   |  23  |  0.64    Ca    9.1      19 Feb 2024 05:46  Phos  3.7     02-19  Mg     2.00     02-19    TPro  6.8  /  Alb  3.2<L>  /  TBili  0.2  /  DBili  <0.2  /  AST  9   /  ALT  23  /  AlkPhos  113  02-19    LIVER FUNCTIONS - ( 19 Feb 2024 05:46 )  Alb: 3.2 g/dL / Pro: 6.8 g/dL / ALK PHOS: 113 U/L / ALT: 23 U/L / AST: 9 U/L / GGT: x           PT/INR - ( 19 Feb 2024 05:46 )   PT: 13.4 sec;   INR: 1.19 ratio           Urinalysis Basic - ( 19 Feb 2024 05:46 )    Color: x / Appearance: x / SG: x / pH: x  Gluc: 99 mg/dL / Ketone: x  / Bili: x / Urobili: x   Blood: x / Protein: x / Nitrite: x   Leuk Esterase: x / RBC: x / WBC x   Sq Epi: x / Non Sq Epi: x / Bacteria: x                              12.2   13.23 )-----------( 431      ( 19 Feb 2024 05:46 )             37.1                         12.5   15.83 )-----------( 403      ( 18 Feb 2024 05:10 )             36.9                         12.1   12.54 )-----------( 391      ( 17 Feb 2024 05:36 )             35.9   
Interval Events:   No acute events  Patient reports ongoing lower abdominal pain, worse with defecation, and nausea without vomiting. Yesterday she had 2 BMs that were semi-liquid with mucus and no blood. 1 BM so far today was similar. She feels somewhat improved   Abd soft, nondistended. TTP @ lower abdomen  Vitals and labs remain stable    Hospital Medications:  acetaminophen     Tablet .. 650 milliGRAM(s) Oral every 6 hours PRN  aluminum hydroxide/magnesium hydroxide/simethicone Suspension 30 milliLiter(s) Oral every 4 hours PRN  influenza   Vaccine 0.5 milliLiter(s) IntraMuscular once  ketorolac   Injectable 15 milliGRAM(s) IV Push every 6 hours PRN  lactated ringers. 1000 milliLiter(s) IV Continuous <Continuous>  melatonin 3 milliGRAM(s) Oral at bedtime PRN  methylPREDNISolone sodium succinate Injectable 20 milliGRAM(s) IV Push every 8 hours  morphine  - Injectable 3 milliGRAM(s) IV Push every 4 hours PRN  ondansetron Injectable 4 milliGRAM(s) IV Push every 8 hours PRN  pantoprazole    Tablet 40 milliGRAM(s) Oral before breakfast  sodium chloride 0.9%. 1000 milliLiter(s) IV Continuous <Continuous>      ROS: See above.    PHYSICAL EXAM:   Vital Signs:  Vital Signs Last 24 Hrs  T(C): 36.8 (17 Feb 2024 11:21), Max: 37.5 (16 Feb 2024 18:36)  T(F): 98.3 (17 Feb 2024 11:21), Max: 99.5 (16 Feb 2024 18:36)  HR: 65 (17 Feb 2024 11:21) (53 - 82)  BP: 117/64 (17 Feb 2024 11:21) (93/63 - 126/76)  BP(mean): --  RR: 18 (17 Feb 2024 11:21) (18 - 18)  SpO2: 99% (17 Feb 2024 11:21) (98% - 100%)    Parameters below as of 17 Feb 2024 11:21  Patient On (Oxygen Delivery Method): room air      Daily     Daily     GENERAL:  NAD, resting comfortably in bed  HEENT:  sclera anicteric  CHEST:  Normal Effort  HEART:  HDS  ABDOMEN:  Soft, non-distended, lower abd TTP; no guarding  SKIN:  Warm & Dry. No jaundice  NEURO:  Alert, conversant, no focal deficit    LABS:                        12.1   12.54 )-----------( 391      ( 17 Feb 2024 05:36 )             35.9     Mean Cell Volume: 89.8 fL (02-17-24 @ 05:36)    02-17    138  |  103  |  8   ----------------------------<  92  4.2   |  24  |  0.62    Ca    9.1      17 Feb 2024 05:36  Phos  3.1     02-17  Mg     2.10     02-17    TPro  6.9  /  Alb  3.3  /  TBili  <0.2  /  DBili  <0.2  /  AST  16  /  ALT  32  /  AlkPhos  126<H>  02-17    LIVER FUNCTIONS - ( 17 Feb 2024 05:36 )  Alb: 3.3 g/dL / Pro: 6.9 g/dL / ALK PHOS: 126 U/L / ALT: 32 U/L / AST: 16 U/L / GGT: x           PT/INR - ( 17 Feb 2024 05:36 )   PT: 13.5 sec;   INR: 1.20 ratio           Urinalysis Basic - ( 17 Feb 2024 05:36 )    Color: x / Appearance: x / SG: x / pH: x  Gluc: 92 mg/dL / Ketone: x  / Bili: x / Urobili: x   Blood: x / Protein: x / Nitrite: x   Leuk Esterase: x / RBC: x / WBC x   Sq Epi: x / Non Sq Epi: x / Bacteria: x                              12.1   12.54 )-----------( 391      ( 17 Feb 2024 05:36 )             35.9                         11.9   14.32 )-----------( 330      ( 16 Feb 2024 07:34 )             36.3                         11.4   15.94 )-----------( 328      ( 15 Feb 2024 05:42 )             34.1                         12.6   16.39 )-----------( 374      ( 14 Feb 2024 19:57 )             37.8   
Interval Events:   -continues to feel improved  -IV --> PO steroids    ROS:   12 point review of systems performed and negative except otherwise noted in HPI.    Hospital Medications:  acetaminophen     Tablet .. 650 milliGRAM(s) Oral every 6 hours PRN  aluminum hydroxide/magnesium hydroxide/simethicone Suspension 30 milliLiter(s) Oral every 4 hours PRN  influenza   Vaccine 0.5 milliLiter(s) IntraMuscular once  lactated ringers. 1000 milliLiter(s) IV Continuous <Continuous>  melatonin 3 milliGRAM(s) Oral at bedtime PRN  ondansetron Injectable 4 milliGRAM(s) IV Push every 8 hours PRN  oxyCODONE    IR 5 milliGRAM(s) Oral every 6 hours PRN  pantoprazole    Tablet 40 milliGRAM(s) Oral before breakfast  predniSONE   Tablet 40 milliGRAM(s) Oral daily  witch hazel Pads 1 Application(s) Topical three times a day PRN      PHYSICAL EXAM:   Vital Signs:  Vital Signs Last 24 Hrs  T(C): 36.8 (21 Feb 2024 15:14), Max: 36.9 (20 Feb 2024 22:20)  T(F): 98.3 (21 Feb 2024 15:14), Max: 98.5 (21 Feb 2024 04:54)  HR: 65 (21 Feb 2024 15:14) (52 - 65)  BP: 128/68 (21 Feb 2024 15:14) (111/61 - 128/68)  BP(mean): --  RR: 18 (21 Feb 2024 15:14) (16 - 18)  SpO2: 99% (21 Feb 2024 15:14) (99% - 100%)    Parameters below as of 21 Feb 2024 15:14  Patient On (Oxygen Delivery Method): room air      Daily     Daily     GENERAL:  NAD, resting comfortably in bed  HEENT:  sclera anicteric  CHEST:  Normal Effort  HEART:  HDS  ABDOMEN:  Soft, mild tender to palpation, non-distended  SKIN:  Warm & Dry. No jaundice  NEURO:  Alert, conversant, no focal deficit    LABS: reviewed                        14.0   10.05 )-----------( 407      ( 21 Feb 2024 10:10 )             42.2     02-21    136  |  99  |  12  ----------------------------<  176<H>  3.9   |  24  |  0.64    Ca    8.8      21 Feb 2024 10:10  Phos  3.3     02-20  Mg     2.00     02-20    TPro  7.4  /  Alb  3.6  /  TBili  0.2  /  DBili  x   /  AST  15  /  ALT  25  /  AlkPhos  114  02-21    LIVER FUNCTIONS - ( 21 Feb 2024 10:10 )  Alb: 3.6 g/dL / Pro: 7.4 g/dL / ALK PHOS: 114 U/L / ALT: 25 U/L / AST: 15 U/L / GGT: x             Interval Diagnostic Studies: see sunrise for full report  
Interval Events:   -patient feels slightly better today    ROS:   12 point review of systems performed and negative except otherwise noted in HPI.    Hospital Medications:  acetaminophen     Tablet .. 650 milliGRAM(s) Oral every 6 hours PRN  aluminum hydroxide/magnesium hydroxide/simethicone Suspension 30 milliLiter(s) Oral every 4 hours PRN  influenza   Vaccine 0.5 milliLiter(s) IntraMuscular once  ketorolac   Injectable 15 milliGRAM(s) IV Push every 6 hours PRN  lactated ringers. 1000 milliLiter(s) IV Continuous <Continuous>  melatonin 3 milliGRAM(s) Oral at bedtime PRN  methylPREDNISolone sodium succinate Injectable 20 milliGRAM(s) IV Push every 8 hours  morphine  - Injectable 3 milliGRAM(s) IV Push every 4 hours PRN  ondansetron Injectable 4 milliGRAM(s) IV Push every 8 hours PRN  pantoprazole    Tablet 40 milliGRAM(s) Oral before breakfast  sodium chloride 0.9%. 1000 milliLiter(s) IV Continuous <Continuous>      PHYSICAL EXAM:   Vital Signs:  Vital Signs Last 24 Hrs  T(C): 37.1 (16 Feb 2024 10:34), Max: 37.1 (15 Feb 2024 22:00)  T(F): 98.7 (16 Feb 2024 10:34), Max: 98.8 (16 Feb 2024 02:00)  HR: 77 (16 Feb 2024 10:34) (60 - 77)  BP: 115/73 (16 Feb 2024 10:34) (111/67 - 124/74)  BP(mean): --  RR: 18 (16 Feb 2024 10:34) (16 - 18)  SpO2: 97% (16 Feb 2024 10:34) (96% - 99%)    Parameters below as of 16 Feb 2024 10:34  Patient On (Oxygen Delivery Method): room air      Daily     Daily     GENERAL: NAD  HEENT:  Normocephalic/atraumatic, no scleral icterus  CHEST:  No accessory muscle use, breathing room air comfortably  HEART:  Regular rate and rhythm  ABDOMEN:  Soft, diffusely tender to palpation, nondistended  EXTREMITIES: no b/l LE edema  SKIN:  No jaundice  NEURO:  Alert and oriented x 3    LABS: reviewed                        11.9   14.32 )-----------( 330      ( 16 Feb 2024 07:34 )             36.3     02-16    136  |  103  |  6<L>  ----------------------------<  104<H>  4.3   |  22  |  0.65    Ca    9.0      16 Feb 2024 07:34  Phos  3.8     02-16  Mg     2.00     02-16    TPro  6.8  /  Alb  3.3  /  TBili  <0.2  /  DBili  <0.2  /  AST  17  /  ALT  35<H>  /  AlkPhos  138<H>  02-16    LIVER FUNCTIONS - ( 16 Feb 2024 07:34 )  Alb: 3.3 g/dL / Pro: 6.8 g/dL / ALK PHOS: 138 U/L / ALT: 35 U/L / AST: 17 U/L / GGT: x             Interval Diagnostic Studies: see sunrise for full report  
Dr. Soraida Cummings  Pager [phone]    PROGRESS NOTE:     Patient is a 23y old  Female who presents with a chief complaint of Abdominal pain x 5 days, diarrhea (16 Feb 2024 11:38)      SUBJECTIVE / OVERNIGHT EVENTS: pt reports suboptimal pain control for lower abdominal pain, denies chest pain or sob   ADDITIONAL REVIEW OF SYSTEMS: denies bloody bm    MEDICATIONS  (STANDING):  influenza   Vaccine 0.5 milliLiter(s) IntraMuscular once  lactated ringers. 1000 milliLiter(s) (100 mL/Hr) IV Continuous <Continuous>  methylPREDNISolone sodium succinate Injectable 20 milliGRAM(s) IV Push every 8 hours  pantoprazole    Tablet 40 milliGRAM(s) Oral before breakfast  sodium chloride 0.9%. 1000 milliLiter(s) (80 mL/Hr) IV Continuous <Continuous>    MEDICATIONS  (PRN):  acetaminophen     Tablet .. 650 milliGRAM(s) Oral every 6 hours PRN Temp greater or equal to 38C (100.4F), Mild Pain (1 - 3)  aluminum hydroxide/magnesium hydroxide/simethicone Suspension 30 milliLiter(s) Oral every 4 hours PRN Dyspepsia  ketorolac   Injectable 15 milliGRAM(s) IV Push every 6 hours PRN Moderate Pain (4 - 6)  melatonin 3 milliGRAM(s) Oral at bedtime PRN Insomnia  morphine  - Injectable 3 milliGRAM(s) IV Push every 4 hours PRN Severe Pain (7 - 10)  ondansetron Injectable 4 milliGRAM(s) IV Push every 8 hours PRN Nausea and/or Vomiting      CAPILLARY BLOOD GLUCOSE        I&O's Summary      PHYSICAL EXAM:  Vital Signs Last 24 Hrs  T(C): 37.1 (16 Feb 2024 10:34), Max: 37.1 (15 Feb 2024 22:00)  T(F): 98.7 (16 Feb 2024 10:34), Max: 98.8 (16 Feb 2024 02:00)  HR: 77 (16 Feb 2024 10:34) (60 - 77)  BP: 115/73 (16 Feb 2024 10:34) (111/67 - 124/74)  BP(mean): --  RR: 18 (16 Feb 2024 10:34) (16 - 18)  SpO2: 97% (16 Feb 2024 10:34) (96% - 99%)    Parameters below as of 16 Feb 2024 10:34  Patient On (Oxygen Delivery Method): room air      CONSTITUTIONAL: nad, well developed   RESPIRATORY: Normal respiratory effort; lungs are clear to auscultation bilaterally  CARDIOVASCULAR: Regular rate and rhythm, normal S1 and S2, no murmur/rub/gallop; No lower extremity edema; Peripheral pulses are 2+ bilaterally  ABDOMEN: soft, lower abdominal tenderness to palpation, normoactive bowel sounds, no rebound/guarding; No hepatosplenomegaly  MUSCULOSKELETAL: no clubbing or cyanosis of digits; no joint swelling or tenderness to palpation  PSYCH: A+O to person, place, and time; affect appropriate    LABS:                        11.9   14.32 )-----------( 330      ( 16 Feb 2024 07:34 )             36.3     02-16    136  |  103  |  6<L>  ----------------------------<  104<H>  4.3   |  22  |  0.65    Ca    9.0      16 Feb 2024 07:34  Phos  3.8     02-16  Mg     2.00     02-16    TPro  6.8  /  Alb  3.3  /  TBili  <0.2  /  DBili  <0.2  /  AST  17  /  ALT  35<H>  /  AlkPhos  138<H>  02-16    PT/INR - ( 16 Feb 2024 07:34 )   PT: 13.5 sec;   INR: 1.21 ratio               Urinalysis Basic - ( 16 Feb 2024 07:34 )    Color: x / Appearance: x / SG: x / pH: x  Gluc: 104 mg/dL / Ketone: x  / Bili: x / Urobili: x   Blood: x / Protein: x / Nitrite: x   Leuk Esterase: x / RBC: x / WBC x   Sq Epi: x / Non Sq Epi: x / Bacteria: x      RADIOLOGY & ADDITIONAL TESTS:  Results Reviewed:   Imaging Personally Reviewed:  < from: CT Abdomen and Pelvis w/ IV Cont (02.14.24 @ 20:14) >  BOWEL: No bowel obstruction. Terminal ileal wall thickening compatible   with acute Crohn's ileitis.  Liquid density stool with air fluid levels throughout colon in keeping   with diarrheal illness.  Colonic wall thickening with hyperenhancement involving the left half   transverse colon, splenic flexure, descending and rectosigmoid colon in   keeping with acute proctocolitis  PERITONEUM: Trace volume pelvic fluid, within physiologic limits.  VESSELS: Within normal limits.  RETROPERITONEUM/LYMPH NODES: No lymphadenopathy.  ABDOMINAL WALL: No significant acute abnormality.  BONES: No significant acute bony abnormality.    IMPRESSION:  Acute Crohn's terminal ileitis and proctocolitis as described          Electrocardiogram Personally Reviewed:    COORDINATION OF CARE:  Care Discussed with Consultants/Other Providers [Y/N]:  Prior or Outpatient Records Reviewed [Y/N]:  
Interval Events:   -patient continues to imrpove  -tolerating PO without n/v  -endorses mild lower AP when having BM's  -some bloating  -2x BM yesterday, more formed. Not seeing mucous and less blood compared to prior  -patient notes her eczema on B/L UE on hands has improved while admitted    ROS:   12 point review of systems performed and negative except otherwise noted in HPI.    Hospital Medications:  acetaminophen     Tablet .. 650 milliGRAM(s) Oral every 6 hours PRN  aluminum hydroxide/magnesium hydroxide/simethicone Suspension 30 milliLiter(s) Oral every 4 hours PRN  influenza   Vaccine 0.5 milliLiter(s) IntraMuscular once  lactated ringers. 1000 milliLiter(s) IV Continuous <Continuous>  melatonin 3 milliGRAM(s) Oral at bedtime PRN  methylPREDNISolone sodium succinate Injectable 20 milliGRAM(s) IV Push every 8 hours  morphine  - Injectable 3 milliGRAM(s) IV Push every 4 hours PRN  ondansetron Injectable 4 milliGRAM(s) IV Push every 8 hours PRN  pantoprazole    Tablet 40 milliGRAM(s) Oral before breakfast  witch hazel Pads 1 Application(s) Topical three times a day PRN      PHYSICAL EXAM:   Vital Signs:  Vital Signs Last 24 Hrs  T(C): 36.5 (20 Feb 2024 04:44), Max: 36.9 (19 Feb 2024 20:03)  T(F): 97.7 (20 Feb 2024 04:44), Max: 98.4 (19 Feb 2024 20:03)  HR: 60 (20 Feb 2024 04:44) (60 - 65)  BP: 108/62 (20 Feb 2024 04:44) (108/62 - 134/82)  BP(mean): --  RR: 18 (20 Feb 2024 04:44) (17 - 18)  SpO2: 99% (20 Feb 2024 04:44) (99% - 100%)    Parameters below as of 20 Feb 2024 04:44  Patient On (Oxygen Delivery Method): room air      Daily     Daily     GENERAL:  NAD, resting comfortably in bed  HEENT:  sclera anicteric  CHEST:  Normal Effort  HEART:  HDS  ABDOMEN:  Soft, mild tender to palpation, non-distended  SKIN:  Warm & Dry. No jaundice  NEURO:  Alert, conversant, no focal deficit    LABS: reviewed                        13.0   9.84  )-----------( 430      ( 20 Feb 2024 05:10 )             38.8     02-20    137  |  103  |  10  ----------------------------<  110<H>  4.0   |  25  |  0.57    Ca    9.2      20 Feb 2024 05:10  Phos  3.3     02-20  Mg     2.00     02-20    TPro  6.7  /  Alb  3.3  /  TBili  <0.2  /  DBili  x   /  AST  12  /  ALT  23  /  AlkPhos  115  02-20    LIVER FUNCTIONS - ( 20 Feb 2024 05:10 )  Alb: 3.3 g/dL / Pro: 6.7 g/dL / ALK PHOS: 115 U/L / ALT: 23 U/L / AST: 12 U/L / GGT: x             Interval Diagnostic Studies: see sunrise for full report  
Interval Events:   Patient with significant abdominal pain and nausea related to defecation. Requiring morphine for pain control. Stools semiliquid with mucus  She does not feel she's doing much better with IV steroids  Vitals, exam and labs are otherwise stable    Hospital Medications:  acetaminophen     Tablet .. 650 milliGRAM(s) Oral every 6 hours PRN  aluminum hydroxide/magnesium hydroxide/simethicone Suspension 30 milliLiter(s) Oral every 4 hours PRN  influenza   Vaccine 0.5 milliLiter(s) IntraMuscular once  ketorolac   Injectable 15 milliGRAM(s) IV Push every 6 hours PRN  lactated ringers. 1000 milliLiter(s) IV Continuous <Continuous>  lactated ringers. 1000 milliLiter(s) IV Continuous <Continuous>  melatonin 3 milliGRAM(s) Oral at bedtime PRN  methylPREDNISolone sodium succinate Injectable 20 milliGRAM(s) IV Push every 8 hours  morphine  - Injectable 3 milliGRAM(s) IV Push every 4 hours PRN  ondansetron Injectable 4 milliGRAM(s) IV Push every 8 hours PRN  pantoprazole    Tablet 40 milliGRAM(s) Oral before breakfast  sodium chloride 0.9%. 1000 milliLiter(s) IV Continuous <Continuous>  witch hazel Pads 1 Application(s) Topical three times a day PRN      ROS: See above.    PHYSICAL EXAM:   Vital Signs:  Vital Signs Last 24 Hrs  T(C): 37.1 (18 Feb 2024 17:20), Max: 37.2 (18 Feb 2024 01:27)  T(F): 98.7 (18 Feb 2024 17:20), Max: 99 (18 Feb 2024 01:27)  HR: 57 (18 Feb 2024 17:20) (57 - 62)  BP: 128/68 (18 Feb 2024 17:20) (114/60 - 133/82)  BP(mean): --  RR: 17 (18 Feb 2024 17:20) (17 - 18)  SpO2: 100% (18 Feb 2024 17:20) (100% - 100%)    Parameters below as of 18 Feb 2024 17:20  Patient On (Oxygen Delivery Method): room air    Daily     GENERAL:  NAD, resting comfortably in bed  HEENT:  sclera anicteric  CHEST:  Normal Effort  HEART:  HDS  ABDOMEN:  Soft, diffuse ttp , non-distended, no guarding  SKIN:  Warm & Dry. No jaundice  NEURO:  Alert, conversant, no focal deficit    LABS:                        12.5   15.83 )-----------( 403      ( 18 Feb 2024 05:10 )             36.9     Mean Cell Volume: 88.1 fL (02-18-24 @ 05:10)    02-18    134<L>  |  101  |  10  ----------------------------<  106<H>  4.0   |  24  |  0.68    Ca    8.9      18 Feb 2024 05:10  Phos  3.8     02-18  Mg     2.00     02-18    TPro  6.8  /  Alb  3.3  /  TBili  <0.2  /  DBili  <0.2  /  AST  11  /  ALT  25  /  AlkPhos  117  02-18    LIVER FUNCTIONS - ( 18 Feb 2024 05:10 )  Alb: 3.3 g/dL / Pro: 6.8 g/dL / ALK PHOS: 117 U/L / ALT: 25 U/L / AST: 11 U/L / GGT: x           PT/INR - ( 18 Feb 2024 05:10 )   PT: 13.7 sec;   INR: 1.23 ratio           Urinalysis Basic - ( 18 Feb 2024 05:10 )    Color: x / Appearance: x / SG: x / pH: x  Gluc: 106 mg/dL / Ketone: x  / Bili: x / Urobili: x   Blood: x / Protein: x / Nitrite: x   Leuk Esterase: x / RBC: x / WBC x   Sq Epi: x / Non Sq Epi: x / Bacteria: x                              12.5   15.83 )-----------( 403      ( 18 Feb 2024 05:10 )             36.9                         12.1   12.54 )-----------( 391      ( 17 Feb 2024 05:36 )             35.9                         11.9   14.32 )-----------( 330      ( 16 Feb 2024 07:34 )             36.3   
Dr. Soraida Cummings  Pager [phone]    PROGRESS NOTE:     Patient is a 23y old  Female who presents with a chief complaint of Abdominal pain x 5 days, diarrhea (17 Feb 2024 11:57)      SUBJECTIVE / OVERNIGHT EVENTS: pt reports diarrhea earlier today  ADDITIONAL REVIEW OF SYSTEMS: denies chest pain or sob, lower abd pain overall improved , no bloody stool    MEDICATIONS  (STANDING):  influenza   Vaccine 0.5 milliLiter(s) IntraMuscular once  lactated ringers. 1000 milliLiter(s) (100 mL/Hr) IV Continuous <Continuous>  lactated ringers. 1000 milliLiter(s) (75 mL/Hr) IV Continuous <Continuous>  methylPREDNISolone sodium succinate Injectable 20 milliGRAM(s) IV Push every 8 hours  pantoprazole    Tablet 40 milliGRAM(s) Oral before breakfast  sodium chloride 0.9%. 1000 milliLiter(s) (80 mL/Hr) IV Continuous <Continuous>    MEDICATIONS  (PRN):  acetaminophen     Tablet .. 650 milliGRAM(s) Oral every 6 hours PRN Temp greater or equal to 38C (100.4F), Mild Pain (1 - 3)  aluminum hydroxide/magnesium hydroxide/simethicone Suspension 30 milliLiter(s) Oral every 4 hours PRN Dyspepsia  ketorolac   Injectable 15 milliGRAM(s) IV Push every 6 hours PRN Moderate Pain (4 - 6)  melatonin 3 milliGRAM(s) Oral at bedtime PRN Insomnia  morphine  - Injectable 3 milliGRAM(s) IV Push every 4 hours PRN Severe Pain (7 - 10)  ondansetron Injectable 4 milliGRAM(s) IV Push every 8 hours PRN Nausea and/or Vomiting      CAPILLARY BLOOD GLUCOSE        I&O's Summary      PHYSICAL EXAM:  Vital Signs Last 24 Hrs  T(C): 36.8 (17 Feb 2024 11:21), Max: 37.5 (16 Feb 2024 18:36)  T(F): 98.3 (17 Feb 2024 11:21), Max: 99.5 (16 Feb 2024 18:36)  HR: 58 (17 Feb 2024 12:39) (53 - 82)  BP: 113/61 (17 Feb 2024 12:39) (93/63 - 126/76)  BP(mean): --  RR: 18 (17 Feb 2024 11:21) (18 - 18)  SpO2: 100% (17 Feb 2024 12:39) (98% - 100%)    Parameters below as of 17 Feb 2024 11:21  Patient On (Oxygen Delivery Method): room air      CONSTITUTIONAL: nad, well developed   RESPIRATORY: Normal respiratory effort; lungs are clear to auscultation bilaterally  CARDIOVASCULAR: Regular rate and rhythm, normal S1 and S2, no murmur/rub/gallop; No lower extremity edema; Peripheral pulses are 2+ bilaterally  ABDOMEN: soft, lower abdominal tenderness to palpation, normoactive bowel sounds, no rebound/guarding;  MUSCULOSKELETAL: no clubbing or cyanosis of digits; no joint swelling or tenderness to palpation  PSYCH: A+O to person, place, and time; affect appropriate      LABS:                        12.1   12.54 )-----------( 391      ( 17 Feb 2024 05:36 )             35.9     02-17    138  |  103  |  8   ----------------------------<  92  4.2   |  24  |  0.62    Ca    9.1      17 Feb 2024 05:36  Phos  3.1     02-17  Mg     2.10     02-17    TPro  6.9  /  Alb  3.3  /  TBili  <0.2  /  DBili  <0.2  /  AST  16  /  ALT  32  /  AlkPhos  126<H>  02-17    PT/INR - ( 17 Feb 2024 05:36 )   PT: 13.5 sec;   INR: 1.20 ratio               Urinalysis Basic - ( 17 Feb 2024 05:36 )    Color: x / Appearance: x / SG: x / pH: x  Gluc: 92 mg/dL / Ketone: x  / Bili: x / Urobili: x   Blood: x / Protein: x / Nitrite: x   Leuk Esterase: x / RBC: x / WBC x   Sq Epi: x / Non Sq Epi: x / Bacteria: x          RADIOLOGY & ADDITIONAL TESTS:  Results Reviewed:   Imaging Personally Reviewed:  Electrocardiogram Personally Reviewed:    COORDINATION OF CARE:  Care Discussed with Consultants/Other Providers [Y/N]:  Prior or Outpatient Records Reviewed [Y/N]:  
Dr. Soraida Cummings  Pager [phone]    PROGRESS NOTE:     Patient is a 23y old  Female who presents with a chief complaint of Abdominal pain x 5 days, diarrhea (19 Feb 2024 10:57)      SUBJECTIVE / OVERNIGHT EVENTS: denies chest pain or sob , abd pain improved after remicade infusion last night   ADDITIONAL REVIEW OF SYSTEMS: afebrile     MEDICATIONS  (STANDING):  influenza   Vaccine 0.5 milliLiter(s) IntraMuscular once  lactated ringers. 1000 milliLiter(s) (75 mL/Hr) IV Continuous <Continuous>  methylPREDNISolone sodium succinate Injectable 20 milliGRAM(s) IV Push every 8 hours  pantoprazole    Tablet 40 milliGRAM(s) Oral before breakfast    MEDICATIONS  (PRN):  acetaminophen     Tablet .. 650 milliGRAM(s) Oral every 6 hours PRN Temp greater or equal to 38C (100.4F), Mild Pain (1 - 3)  aluminum hydroxide/magnesium hydroxide/simethicone Suspension 30 milliLiter(s) Oral every 4 hours PRN Dyspepsia  melatonin 3 milliGRAM(s) Oral at bedtime PRN Insomnia  morphine  - Injectable 3 milliGRAM(s) IV Push every 4 hours PRN Severe Pain (7 - 10)  ondansetron Injectable 4 milliGRAM(s) IV Push every 8 hours PRN Nausea and/or Vomiting  witch hazel Pads 1 Application(s) Topical three times a day PRN Hemorrhoidal discomfort      CAPILLARY BLOOD GLUCOSE        I&O's Summary      PHYSICAL EXAM:  Vital Signs Last 24 Hrs  T(C): 36.4 (19 Feb 2024 10:02), Max: 37.1 (18 Feb 2024 17:20)  T(F): 97.6 (19 Feb 2024 10:02), Max: 98.7 (18 Feb 2024 17:20)  HR: 59 (19 Feb 2024 10:02) (56 - 61)  BP: 135/76 (19 Feb 2024 10:02) (114/59 - 135/76)  BP(mean): --  RR: 17 (19 Feb 2024 10:02) (16 - 17)  SpO2: 100% (19 Feb 2024 10:02) (100% - 100%)    Parameters below as of 19 Feb 2024 10:02  Patient On (Oxygen Delivery Method): room air        CONSTITUTIONAL: nad, well developed   RESPIRATORY: Normal respiratory effort; lungs are clear to auscultation bilaterally  CARDIOVASCULAR: Regular rate and rhythm, normal S1 and S2, no murmur/rub/gallop; No lower extremity edema; Peripheral pulses are 2+ bilaterally  ABDOMEN: soft, lower abdominal tenderness to palpation, normoactive bowel sounds, no rebound/guarding;  MUSCULOSKELETAL: no clubbing or cyanosis of digits; no joint swelling or tenderness to palpation  PSYCH: A+O to person, place, and time; affect appropriate    LABS:                        12.2   13.23 )-----------( 431      ( 19 Feb 2024 05:46 )             37.1     02-19    135  |  101  |  10  ----------------------------<  99  4.0   |  23  |  0.64    Ca    9.1      19 Feb 2024 05:46  Phos  3.7     02-19  Mg     2.00     02-19    TPro  6.8  /  Alb  3.2<L>  /  TBili  0.2  /  DBili  <0.2  /  AST  9   /  ALT  23  /  AlkPhos  113  02-19    PT/INR - ( 19 Feb 2024 05:46 )   PT: 13.4 sec;   INR: 1.19 ratio               Urinalysis Basic - ( 19 Feb 2024 05:46 )    Color: x / Appearance: x / SG: x / pH: x  Gluc: 99 mg/dL / Ketone: x  / Bili: x / Urobili: x   Blood: x / Protein: x / Nitrite: x   Leuk Esterase: x / RBC: x / WBC x   Sq Epi: x / Non Sq Epi: x / Bacteria: x          RADIOLOGY & ADDITIONAL TESTS:  Results Reviewed:   Imaging Personally Reviewed:  Electrocardiogram Personally Reviewed:    COORDINATION OF CARE:  Care Discussed with Consultants/Other Providers [Y/N]:  Prior or Outpatient Records Reviewed [Y/N]:  
[facility] Division of Hospital Medicine  Osmin Zarco DO  Available via MS Teams  Pager:[phone]    SUBJECTIVE / OVERNIGHT EVENTS: Patient describes R-sided abdominal discomfort, nonradiating. Describes an episode of diarrhea yesterday soft with streaks of blood (no mars blood). Patient denies nausea, vomiting, fevers chills.    ADDITIONAL REVIEW OF SYSTEMS:  Review of Systems:   CONSTITUTIONAL: No fever, weight loss  EYES: No eye pain, visual disturbances, or discharge  ENMT:  No difficulty hearing, tinnitus, vertigo; No sinus or throat pain  RESPIRATORY: No SOB. No cough, wheezing, chills or hemoptysis  CARDIOVASCULAR: No chest pain, palpitations, dizziness, or leg swelling  GASTROINTESTINAL: +abdominal or epigastric pain. No nausea, vomiting, or hematemesis; +diarrhea no constipation. No melena or hematochezia.  GENITOURINARY: No dysuria, frequency, hematuria, or incontinence  NEUROLOGICAL: No headaches, memory loss, loss of strength, numbness, or tremors  SKIN: No itching, burning, rashes, or lesions   LYMPH NODES: No enlarged glands  ENDOCRINE: No heat or cold intolerance; No hair loss  MUSCULOSKELETAL: No joint pain or swelling; No muscle pain  PSYCHIATRIC: No depression, anxiety, mood swings, or difficulty sleeping  HEME/LYMPH: No easy bruising, or bleeding gums      MEDICATIONS  (STANDING):  influenza   Vaccine 0.5 milliLiter(s) IntraMuscular once  lactated ringers. 1000 milliLiter(s) (75 mL/Hr) IV Continuous <Continuous>  methylPREDNISolone sodium succinate Injectable 20 milliGRAM(s) IV Push every 8 hours  pantoprazole    Tablet 40 milliGRAM(s) Oral before breakfast    MEDICATIONS  (PRN):  acetaminophen     Tablet .. 650 milliGRAM(s) Oral every 6 hours PRN Temp greater or equal to 38C (100.4F), Mild Pain (1 - 3)  aluminum hydroxide/magnesium hydroxide/simethicone Suspension 30 milliLiter(s) Oral every 4 hours PRN Dyspepsia  melatonin 3 milliGRAM(s) Oral at bedtime PRN Insomnia  morphine  - Injectable 3 milliGRAM(s) IV Push every 4 hours PRN Severe Pain (7 - 10)  ondansetron Injectable 4 milliGRAM(s) IV Push every 8 hours PRN Nausea and/or Vomiting  witch hazel Pads 1 Application(s) Topical three times a day PRN Hemorrhoidal discomfort      I&O's Summary      PHYSICAL EXAM:  Vital Signs Last 24 Hrs  T(C): 36.4 (20 Feb 2024 12:21), Max: 36.9 (19 Feb 2024 20:03)  T(F): 97.6 (20 Feb 2024 12:21), Max: 98.4 (19 Feb 2024 20:03)  HR: 62 (20 Feb 2024 12:21) (60 - 65)  BP: 109/75 (20 Feb 2024 12:21) (108/62 - 134/82)  BP(mean): --  RR: 17 (20 Feb 2024 12:21) (17 - 18)  SpO2: 100% (20 Feb 2024 12:21) (99% - 100%)    Parameters below as of 20 Feb 2024 12:21  Patient On (Oxygen Delivery Method): room air      CONSTITUTIONAL: NAD, well-groomed  EYES: PERRLA; conjunctiva and sclera clear  ENMT: Moist oral mucosa, no pharyngeal injection or exudates; normal dentition  NECK: Supple, no palpable masses; no thyromegaly  RESPIRATORY: Normal respiratory effort; lungs are clear to auscultation bilaterally  CARDIOVASCULAR: Regular rate and rhythm, normal S1 and S2, no murmur/rub/gallop; No lower extremity edema; Peripheral pulses are 2+ bilaterally  ABDOMEN: Nontender to palpation, normoactive bowel sounds, no rebound/guarding; No hepatosplenomegaly  MUSCULOSKELETAL:  Normal gait; no clubbing or cyanosis of digits; no joint swelling or tenderness to palpation  PSYCH: A+O to person, place, and time; affect appropriate  NEUROLOGY: CN 2-12 are intact and symmetric; no gross sensory deficits   SKIN: No rashes; no palpable lesions    LABS:                        13.0   9.84  )-----------( 430      ( 20 Feb 2024 05:10 )             38.8     02-20    137  |  103  |  10  ----------------------------<  110<H>  4.0   |  25  |  0.57    Ca    9.2      20 Feb 2024 05:10  Phos  3.3     02-20  Mg     2.00     02-20    TPro  6.7  /  Alb  3.3  /  TBili  <0.2  /  DBili  x   /  AST  12  /  ALT  23  /  AlkPhos  115  02-20    PT/INR - ( 20 Feb 2024 05:10 )   PT: 12.8 sec;   INR: 1.15 ratio               Urinalysis Basic - ( 20 Feb 2024 05:10 )    Color: x / Appearance: x / SG: x / pH: x  Gluc: 110 mg/dL / Ketone: x  / Bili: x / Urobili: x   Blood: x / Protein: x / Nitrite: x   Leuk Esterase: x / RBC: x / WBC x   Sq Epi: x / Non Sq Epi: x / Bacteria: x            RADIOLOGY & ADDITIONAL TESTS:  New Imaging Personally Reviewed Today: Yes  New Electrocardiogram Personally Reviewed Today: N/A  Other Results Reviewed Today: Yes  Prior or Outpatient Records Reviewed Today with Summary: Yes    COORDINATION OF CARE:  Consultant Communication and Details of Discussion (where applicable): Yes    
Dr. Soraida Cummings  Pager [phone]    PROGRESS NOTE:     Patient is a 23y old  Female who presents with a chief complaint of Abdominal pain x 5 days, diarrhea (17 Feb 2024 14:02)      SUBJECTIVE / OVERNIGHT EVENTS: pt frustrated about the slow progress in her symptoms  ADDITIONAL REVIEW OF SYSTEMS: afebrile , had 2 BM's    MEDICATIONS  (STANDING):  influenza   Vaccine 0.5 milliLiter(s) IntraMuscular once  lactated ringers. 1000 milliLiter(s) (75 mL/Hr) IV Continuous <Continuous>  lactated ringers. 1000 milliLiter(s) (100 mL/Hr) IV Continuous <Continuous>  methylPREDNISolone sodium succinate Injectable 20 milliGRAM(s) IV Push every 8 hours  pantoprazole    Tablet 40 milliGRAM(s) Oral before breakfast  sodium chloride 0.9%. 1000 milliLiter(s) (80 mL/Hr) IV Continuous <Continuous>    MEDICATIONS  (PRN):  acetaminophen     Tablet .. 650 milliGRAM(s) Oral every 6 hours PRN Temp greater or equal to 38C (100.4F), Mild Pain (1 - 3)  aluminum hydroxide/magnesium hydroxide/simethicone Suspension 30 milliLiter(s) Oral every 4 hours PRN Dyspepsia  ketorolac   Injectable 15 milliGRAM(s) IV Push every 6 hours PRN Moderate Pain (4 - 6)  melatonin 3 milliGRAM(s) Oral at bedtime PRN Insomnia  morphine  - Injectable 3 milliGRAM(s) IV Push every 4 hours PRN Severe Pain (7 - 10)  ondansetron Injectable 4 milliGRAM(s) IV Push every 8 hours PRN Nausea and/or Vomiting  witch hazel Pads 1 Application(s) Topical three times a day PRN Hemorrhoidal discomfort      CAPILLARY BLOOD GLUCOSE        I&O's Summary      PHYSICAL EXAM:  Vital Signs Last 24 Hrs  T(C): 37.2 (18 Feb 2024 05:14), Max: 37.2 (18 Feb 2024 01:27)  T(F): 98.9 (18 Feb 2024 05:14), Max: 99 (18 Feb 2024 01:27)  HR: 62 (18 Feb 2024 05:14) (58 - 62)  BP: 117/62 (18 Feb 2024 05:14) (113/61 - 133/82)  BP(mean): --  RR: 18 (18 Feb 2024 05:14) (18 - 18)  SpO2: 100% (18 Feb 2024 05:14) (100% - 100%)    Parameters below as of 18 Feb 2024 05:14  Patient On (Oxygen Delivery Method): room air      CONSTITUTIONAL: nad, well developed   RESPIRATORY: Normal respiratory effort; lungs are clear to auscultation bilaterally  CARDIOVASCULAR: Regular rate and rhythm, normal S1 and S2, no murmur/rub/gallop; No lower extremity edema; Peripheral pulses are 2+ bilaterally  ABDOMEN: soft, lower abdominal tenderness to palpation, normoactive bowel sounds, no rebound/guarding;  MUSCULOSKELETAL: no clubbing or cyanosis of digits; no joint swelling or tenderness to palpation  PSYCH: A+O to person, place, and time; affect appropriate  LABS:                        12.5   15.83 )-----------( 403      ( 18 Feb 2024 05:10 )             36.9     02-18    134<L>  |  101  |  10  ----------------------------<  106<H>  4.0   |  24  |  0.68    Ca    8.9      18 Feb 2024 05:10  Phos  3.8     02-18  Mg     2.00     02-18    TPro  6.8  /  Alb  3.3  /  TBili  <0.2  /  DBili  <0.2  /  AST  11  /  ALT  25  /  AlkPhos  117  02-18    PT/INR - ( 18 Feb 2024 05:10 )   PT: 13.7 sec;   INR: 1.23 ratio               Urinalysis Basic - ( 18 Feb 2024 05:10 )    Color: x / Appearance: x / SG: x / pH: x  Gluc: 106 mg/dL / Ketone: x  / Bili: x / Urobili: x   Blood: x / Protein: x / Nitrite: x   Leuk Esterase: x / RBC: x / WBC x   Sq Epi: x / Non Sq Epi: x / Bacteria: x          RADIOLOGY & ADDITIONAL TESTS:  Results Reviewed:   Imaging Personally Reviewed:  Electrocardiogram Personally Reviewed:    COORDINATION OF CARE:  Care Discussed with Consultants/Other Providers [Y/N]:  Prior or Outpatient Records Reviewed [Y/N]:  
Patient is a 23y old  Female who presents with a chief complaint of Abdominal pain x 5 days, diarrhea (15 Feb 2024 09:36)      SUBJECTIVE / OVERNIGHT EVENTS:  Patient informed stool Gi PCR is negative but still awaiting c diff. Ct c/w Crohns flair.  Patient noted Gi Dr Grover was just there and will come back .  She also notes 8/10 abdominal pain "all over but mostly in lower abdomen'  The mrpgine helps but wears off before the 4 hrs that is due    MEDICATIONS  (STANDING):  lactated ringers. 1000 milliLiter(s) (100 mL/Hr) IV Continuous <Continuous>  methylPREDNISolone sodium succinate Injectable 20 milliGRAM(s) IV Push every 8 hours  morphine  - Injectable 2 milliGRAM(s) IV Push once    MEDICATIONS  (PRN):  acetaminophen     Tablet .. 650 milliGRAM(s) Oral every 6 hours PRN Temp greater or equal to 38C (100.4F), Mild Pain (1 - 3)  aluminum hydroxide/magnesium hydroxide/simethicone Suspension 30 milliLiter(s) Oral every 4 hours PRN Dyspepsia  melatonin 3 milliGRAM(s) Oral at bedtime PRN Insomnia  morphine  - Injectable 2 milliGRAM(s) IV Push every 4 hours PRN Moderate Pain (4 - 6)  morphine  - Injectable 3 milliGRAM(s) IV Push every 6 hours PRN Severe Pain (7 - 10)  ondansetron Injectable 4 milliGRAM(s) IV Push every 8 hours PRN Nausea and/or Vomiting        CAPILLARY BLOOD GLUCOSE  Vital Signs Last 24 Hrs    T(F): 98.1 (15 Feb 2024 07:02), Max: 98.1 (14 Feb 2024 16:29)  HR: 81 (15 Feb 2024 07:02) (79 - 82)  BP: 118/66 (15 Feb 2024 07:02) (112/73 - 118/66)  BP(mean): 84 (15 Feb 2024 07:02) (79 - 84)  RR: 16 (15 Feb 2024 07:02) (16 - 17)  SpO2: 100% (15 Feb 2024 07:02) (98% - 100%)    Parameters below as of 15 Feb 2024 07:02  Patient On (Oxygen Delivery Method): room air        PHYSICAL EXAM:  GENERAL: NAD,   HEAD:  Atraumatic, Normocephalic  EYES: EOMI, PERRLA, conjunctiva and sclera clear  NECK: Supple, No JVD  CHEST/LUNG: Clear to auscultation bilaterally; No wheeze  HEART: Regular rate and rhythm; No murmurs, rubs, or gallops  ABDOMEN: Soft, Lowe abd discomfort on palpation, no R/G   Nondistended; Bowel sounds present  EXTREMITIES:  2+ Peripheral Pulses, No clubbing, cyanosis, or edema  PSYCH: AAOx3  NEUROLOGY: non-focal      LABS:                        11.4   15.94 )-----------( 328      ( 15 Feb 2024 05:42 )             34.1     02-15    136  |  101  |  6<L>  ----------------------------<  116<H>  4.0   |  23  |  0.81    Ca    8.7      15 Feb 2024 05:42  Mg     1.80     02-15    TPro  6.6  /  Alb  3.3  /  TBili  <0.2  /  DBili  x   /  AST  23  /  ALT  37<H>  /  AlkPhos  135<H>  02-15      RADIOLOGY & ADDITIONAL TESTS:    CT Abdomen and Pelvis w/ IV Cont (02.14.24 @ 20:14) >  IMPRESSION:  Acute Crohn's terminal ileitis and proctocolitis as described    < end of copied text >  < from: CT Abdomen and Pelvis w/ IV Cont (02.14.24 @ 20:14) >  LIVER: Hepatomegaly approximately measures 21 cm. Mild focal fat   deposition adjacent to the falciform ligament.    < end of copied text >  < from: CT Abdomen and Pelvis w/ IV Cont (02.14.24 @ 20:14) >  REPRODUCTIVE ORGANS: Rim enhancing mildly crenulated 2.7 cm likely   involuting hemorrhagic cyst within right ovary        Care Discussed with Consultants/Other Providers:  Patient and pa

## 2024-02-21 NOTE — PROGRESS NOTE ADULT - PROVIDER SPECIALTY LIST ADULT
Gastroenterology
Hospitalist
Gastroenterology
Gastroenterology
Hospitalist
Internal Medicine
Hospitalist

## 2024-02-21 NOTE — PROGRESS NOTE ADULT - ASSESSMENT
22yo F w/ PMHx Crohns Dz on Remicade infusions q8 wks (first dose inpatient at time of dx 6/2023) due for next infusion March 2024) presents after having 5 days of severe diffuse cramping abdominal pain associated with many episodes, daily of blood and mucus streaked stool (7-8 BM/day). Pt reports feels like when she first presented with flare.    #Crohns Disease flare  *ESR 38, CRP 85.8  *Started PO Prednisone 40mg qD 2/12 at home  *Due for next maintenance dose of Remicade March 2024, 8 weeks after dose in January   *C diff and GI PCR negative  *Started on IV solumedrol with some improvement   *Quant gold and HBV testing negative in 6/2023  *Due to persistent pain on IV steroids, given early maintenance dose of Remicade on 2/18 (5 mg/kg) with improvement in pain    Recommendations  - c/w prednisone 40mg qD  - c/w PPI qD  - ok for DC planning from GI perspective  - please ensure follow up with GI in 1-2 weeks following DC for steroid taper direction and close post-dc follow up  - patient can follow up with her GI Dr. Crump at [facility], but patient vocalizing that she may prefer following at [facility], information provided as below to patient.  - patient would like to have option to f/u with GI at [facility] - Please provide patient with Gastroenterology Clinic to make appointment; [phone] ([facility]ulty [facility] at [address] [place] [address])     #Hepatomegaly  #Abnormal liver chemistry   -hepatomegaly and elevated alk phos noted during admit in 6/2023 - plan at that time was for OP f/u (extensive hep w/u sent and normal). MRCP without evidence of PSC  -Alk phos currently downtrending and not as high as levels in June.  -No hepatocellular injury pattern at this time, patient will need OP hepatology f/u after discharge  -please provide number for liver clinic to patient prior to DC - [phone]      All recommendations preliminary until note signed by service attending.    Thank you for involving us in the care of this patient. Please contact should any concern or questions arise.    Taz Grover MD   Gastroenterology/Hepatology Fellow PGY-5  Available on [employer] Teams 7am - 5pm  [email or web address]    NON-URGENT CONSULTS:  Please email:  [email or web address]    After 5pm, please contact the on-call GI fellow. [phone]    AT NIGHT AND ON WEEKENDS:  Contact on-call GI fellow via answering service ([phone]) from 5pm-8am and on weekends/holidays

## 2024-02-27 NOTE — PROGRESS NOTE ADULT - ATTENDING COMMENTS
Contacted patient and scheduled holter monitor and follow up with Kyle Santoro NP.   
23F CD (right colon, TI) on remicade, p/w cramping abdominal pain and blood streaked diarrhea c/f flare. GI PCR and C diff neg. CTAP with "Terminal ileal wall thickening compatible with acute Crohn's ileitis.Colonic wall thickening with hyperenhancement involving the left half transverse colon, splenic flexure, descending and rectosigmoid colon in keeping with acute proctocolitis."     Improving with steroids. Labs stable, VS stable, tolerating diet. Will plan to transition to pred 40 PO tomorrow. VTE prophy. Trend CRP.
Assessment/recommendations as noted above. Transitioned to oral prednisone-40 mg daily with plan for outpatient tapering. Pantoprazole 40 mg q.d. Low-residue diet. DVT prophylaxis while in hospital. Continue to monitor serial CBC and CRP.
Assessment/recommendations as noted. Report symptomatic improvement. Abdominal pain improved-mild residual right lower quadrant discomfort but significantly better from admission. Bowel movements improved with decreased frequency and more formed.  IMP: Crohn's disease-status post flare. Symptomatically improved.  REC:  -Transition to prednisone 40 mg q.d. starting 2/21.  -DVT prophylaxis.  -Low residue diet.  -Monitor serial CBC and CRP.  -Pantoprazole 40 mg q.d.  -Minimize opiate analgesics.
23F, Crohns disease on remicade q8wks (Last dose jan 2024), now admitted with abd pain and bloody diarrhea consistent w flare.   CT - terminal ileitis and proctocolitis   On IV steroids - feels that abd pain and bloody diarrhea are improving in intensity and frequency but still has symptoms     Would continue IV steroids today and re-evaluate tomorrow   trend crp, cbc, cmp   diet as tolerated   DVT prophylaxis   will need outpatient hepatology f/u   she will f/u w her GI Dr Poole on d/c   GI to follow, please call w questions
23F, Crohns disease on remicade q8wks (Last dose jan 2024), now admitted with abd pain and bloody diarrhea consistent w flare.   States dose in January was first maintenance dose but it was delayed by several wks due to eczema.   CT - terminal ileitis and proctocolitis   Initially on IV steroids but had persistent symptoms of abd pain, diarrhea   s/p dose of remicade yesterday 2/18/24 with significant improvement in abd pain  Stool more formed today, no blood     Continue IV steroids   If continues to do well, will switch to po steroids tomorrow   trend crp, cbc, cmp   low residue diet as tolerated   DVT prophylaxis   will need outpatient hepatology f/u   she will f/u w her GI Dr Poole on d/c   GI to follow, please call w questions
23F, Crohns disease on remicade q8wks (Last dose jan 2024), now admitted with abd pain and bloody diarrhea consistent w flare.   States dose in January was first maintenance dose but it was delayed by several wks due to eczema.   CT - terminal ileitis and proctocolitis   On IV steroids     Some improvement on IV steroids but still with significant abdominal pain, diarrhea  +hematochezia   continue iv steroids   would give remicade 5mg/kg dose x 1 now for ongoing symptoms despite steroids   trend crp, cbc, cmp   low residue diet as tolerated   DVT prophylaxis   will need outpatient hepatology f/u   she will f/u w her GI Dr Poole on d/c   GI to follow, please call w questions

## 2024-03-15 ENCOUNTER — APPOINTMENT (OUTPATIENT)
Dept: GASTROENTEROLOGY | Facility: CLINIC | Age: 24
End: 2024-03-15

## 2024-04-18 ENCOUNTER — APPOINTMENT (OUTPATIENT)
Dept: GASTROENTEROLOGY | Facility: CLINIC | Age: 24
End: 2024-04-18

## 2024-07-09 NOTE — DISCHARGE NOTE PROVIDER - NSCORESITESY/N_GEN_A_CORE_RD
..  Venipuncture Blood Specimen Collection  Venipuncture performed in Lt arm by Marissa Lau with good hemostasis. Patient tolerated the procedure well without complications.   07/09/24   Lorin Garces MA    No

## 2024-09-03 NOTE — ED ADULT NURSE NOTE - OBJECTIVE STATEMENT
Received pt to intake 6, A+Ox4, ambulatory. C/O abdominal pain, nausea, diarrhea, pt relates symptoms to Crohn's flare up. ABD is soft, tender, non distended. Pt denies any chest pain, SOB, headache, dizziness, fever, chills.  20G to LAC, Labs sent, Medicated as per Provider orders, plan of care ongoing, no further concerns as of present, patient expresses no other needs at this time, call light within reach. Normal rate, regular rhythm.  Heart sounds S1, S2.  No murmurs, rubs or gallops.

## 2024-09-13 ENCOUNTER — EMERGENCY (EMERGENCY)
Facility: HOSPITAL | Age: 24
LOS: 1 days | Discharge: HOME | End: 2024-09-13
Admitting: STUDENT IN AN ORGANIZED HEALTH CARE EDUCATION/TRAINING PROGRAM
Payer: SELF-PAY

## 2024-09-13 VITALS
RESPIRATION RATE: 18 BRPM | TEMPERATURE: 99 F | SYSTOLIC BLOOD PRESSURE: 105 MMHG | HEART RATE: 62 BPM | OXYGEN SATURATION: 100 % | DIASTOLIC BLOOD PRESSURE: 74 MMHG

## 2024-09-13 VITALS
DIASTOLIC BLOOD PRESSURE: 79 MMHG | WEIGHT: 154.98 LBS | RESPIRATION RATE: 16 BRPM | TEMPERATURE: 98 F | SYSTOLIC BLOOD PRESSURE: 121 MMHG | OXYGEN SATURATION: 99 % | HEART RATE: 77 BPM

## 2024-09-13 LAB
ALBUMIN SERPL ELPH-MCNC: 3.8 G/DL — SIGNIFICANT CHANGE UP (ref 3.3–5)
ALP SERPL-CCNC: 117 U/L — SIGNIFICANT CHANGE UP (ref 40–120)
ALT FLD-CCNC: 27 U/L — SIGNIFICANT CHANGE UP (ref 4–33)
ANION GAP SERPL CALC-SCNC: 10 MMOL/L — SIGNIFICANT CHANGE UP (ref 7–14)
APPEARANCE UR: CLEAR — SIGNIFICANT CHANGE UP
AST SERPL-CCNC: 38 U/L — HIGH (ref 4–32)
BASOPHILS # BLD AUTO: 0.01 K/UL — SIGNIFICANT CHANGE UP (ref 0–0.2)
BASOPHILS NFR BLD AUTO: 0.1 % — SIGNIFICANT CHANGE UP (ref 0–2)
BILIRUB SERPL-MCNC: 0.2 MG/DL — SIGNIFICANT CHANGE UP (ref 0.2–1.2)
BILIRUB UR-MCNC: NEGATIVE — SIGNIFICANT CHANGE UP
BUN SERPL-MCNC: 8 MG/DL — SIGNIFICANT CHANGE UP (ref 7–23)
CALCIUM SERPL-MCNC: 9.3 MG/DL — SIGNIFICANT CHANGE UP (ref 8.4–10.5)
CHLORIDE SERPL-SCNC: 106 MMOL/L — SIGNIFICANT CHANGE UP (ref 98–107)
CO2 SERPL-SCNC: 21 MMOL/L — LOW (ref 22–31)
COLOR SPEC: YELLOW — SIGNIFICANT CHANGE UP
CREAT SERPL-MCNC: 0.65 MG/DL — SIGNIFICANT CHANGE UP (ref 0.5–1.3)
DIFF PNL FLD: NEGATIVE — SIGNIFICANT CHANGE UP
EGFR: 125 ML/MIN/1.73M2 — SIGNIFICANT CHANGE UP
EOSINOPHIL # BLD AUTO: 0 K/UL — SIGNIFICANT CHANGE UP (ref 0–0.5)
EOSINOPHIL NFR BLD AUTO: 0 % — SIGNIFICANT CHANGE UP (ref 0–6)
GLUCOSE SERPL-MCNC: 80 MG/DL — SIGNIFICANT CHANGE UP (ref 70–99)
GLUCOSE UR QL: NEGATIVE MG/DL — SIGNIFICANT CHANGE UP
HCG UR QL: NEGATIVE — SIGNIFICANT CHANGE UP
HCT VFR BLD CALC: 33.4 % — LOW (ref 34.5–45)
HGB BLD-MCNC: 11.2 G/DL — LOW (ref 11.5–15.5)
IANC: 4.92 K/UL — SIGNIFICANT CHANGE UP (ref 1.8–7.4)
IMM GRANULOCYTES NFR BLD AUTO: 0.3 % — SIGNIFICANT CHANGE UP (ref 0–0.9)
KETONES UR-MCNC: ABNORMAL MG/DL
LEUKOCYTE ESTERASE UR-ACNC: NEGATIVE — SIGNIFICANT CHANGE UP
LIDOCAIN IGE QN: 74 U/L — HIGH (ref 7–60)
LYMPHOCYTES # BLD AUTO: 1.46 K/UL — SIGNIFICANT CHANGE UP (ref 1–3.3)
LYMPHOCYTES # BLD AUTO: 20.8 % — SIGNIFICANT CHANGE UP (ref 13–44)
MCHC RBC-ENTMCNC: 31.5 PG — SIGNIFICANT CHANGE UP (ref 27–34)
MCHC RBC-ENTMCNC: 33.5 GM/DL — SIGNIFICANT CHANGE UP (ref 32–36)
MCV RBC AUTO: 94.1 FL — SIGNIFICANT CHANGE UP (ref 80–100)
MONOCYTES # BLD AUTO: 0.6 K/UL — SIGNIFICANT CHANGE UP (ref 0–0.9)
MONOCYTES NFR BLD AUTO: 8.6 % — SIGNIFICANT CHANGE UP (ref 2–14)
NEUTROPHILS # BLD AUTO: 4.92 K/UL — SIGNIFICANT CHANGE UP (ref 1.8–7.4)
NEUTROPHILS NFR BLD AUTO: 70.2 % — SIGNIFICANT CHANGE UP (ref 43–77)
NITRITE UR-MCNC: NEGATIVE — SIGNIFICANT CHANGE UP
NRBC # BLD: 0 /100 WBCS — SIGNIFICANT CHANGE UP (ref 0–0)
NRBC # FLD: 0 K/UL — SIGNIFICANT CHANGE UP (ref 0–0)
PH UR: 7 — SIGNIFICANT CHANGE UP (ref 5–8)
PLATELET # BLD AUTO: 331 K/UL — SIGNIFICANT CHANGE UP (ref 150–400)
POTASSIUM SERPL-MCNC: 4.9 MMOL/L — SIGNIFICANT CHANGE UP (ref 3.5–5.3)
POTASSIUM SERPL-SCNC: 4.9 MMOL/L — SIGNIFICANT CHANGE UP (ref 3.5–5.3)
PROT SERPL-MCNC: 7.6 G/DL — SIGNIFICANT CHANGE UP (ref 6–8.3)
PROT UR-MCNC: NEGATIVE MG/DL — SIGNIFICANT CHANGE UP
RBC # BLD: 3.55 M/UL — LOW (ref 3.8–5.2)
RBC # FLD: 13 % — SIGNIFICANT CHANGE UP (ref 10.3–14.5)
SODIUM SERPL-SCNC: 137 MMOL/L — SIGNIFICANT CHANGE UP (ref 135–145)
SP GR SPEC: 1.01 — SIGNIFICANT CHANGE UP (ref 1–1.03)
TROPONIN T, HIGH SENSITIVITY RESULT: <6 NG/L — SIGNIFICANT CHANGE UP
UROBILINOGEN FLD QL: 0.2 MG/DL — SIGNIFICANT CHANGE UP (ref 0.2–1)
WBC # BLD: 7.01 K/UL — SIGNIFICANT CHANGE UP (ref 3.8–10.5)
WBC # FLD AUTO: 7.01 K/UL — SIGNIFICANT CHANGE UP (ref 3.8–10.5)

## 2024-09-13 PROCEDURE — 99285 EMERGENCY DEPT VISIT HI MDM: CPT

## 2024-09-13 PROCEDURE — 74177 CT ABD & PELVIS W/CONTRAST: CPT | Mod: 26,MC

## 2024-09-13 PROCEDURE — 93010 ELECTROCARDIOGRAM REPORT: CPT

## 2024-09-13 RX ORDER — KETOROLAC TROMETHAMINE 30 MG/ML
30 INJECTION, SOLUTION INTRAMUSCULAR ONCE
Refills: 0 | Status: DISCONTINUED | OUTPATIENT
Start: 2024-09-13 | End: 2024-09-13

## 2024-09-13 RX ORDER — LIDOCAINE/BENZALKONIUM/ALCOHOL
1 SOLUTION, NON-ORAL TOPICAL ONCE
Refills: 0 | Status: COMPLETED | OUTPATIENT
Start: 2024-09-13 | End: 2024-09-13

## 2024-09-13 RX ORDER — SODIUM CHLORIDE 9 MG/ML
1000 INJECTION INTRAMUSCULAR; INTRAVENOUS; SUBCUTANEOUS ONCE
Refills: 0 | Status: COMPLETED | OUTPATIENT
Start: 2024-09-13 | End: 2024-09-13

## 2024-09-13 RX ORDER — PREDNISONE 10 MG
2 TABLET, DOSE PACK ORAL
Qty: 10 | Refills: 0
Start: 2024-09-13 | End: 2024-09-17

## 2024-09-13 RX ORDER — ACETAMINOPHEN 325 MG/1
650 TABLET ORAL ONCE
Refills: 0 | Status: COMPLETED | OUTPATIENT
Start: 2024-09-13 | End: 2024-09-13

## 2024-09-13 RX ADMIN — KETOROLAC TROMETHAMINE 30 MILLIGRAM(S): 30 INJECTION, SOLUTION INTRAMUSCULAR at 10:29

## 2024-09-13 RX ADMIN — ACETAMINOPHEN 650 MILLIGRAM(S): 325 TABLET ORAL at 10:29

## 2024-09-13 RX ADMIN — Medication 1 PATCH: at 14:41

## 2024-09-13 RX ADMIN — SODIUM CHLORIDE 2000 MILLILITER(S): 9 INJECTION INTRAMUSCULAR; INTRAVENOUS; SUBCUTANEOUS at 12:27

## 2024-09-13 NOTE — ED PROVIDER NOTE - PROGRESS NOTE DETAILS
pt states she feels better-pain has improved  pt lorri will be dc w/ rheumtology and dermatology f/u   pt concerned about possible crohns flare- will order labs, ct   pt resting comfortably reassess. pt feels better- reports still has r-lbp   will order lidocaine patch  pt resting comfortably- will reassess. pt feels better  ct pending pt feels better  CT=   FINDINGS:  LOWER CHEST: Within normal limits.    LIVER: Within normal limits.  BILE DUCTS: Normal caliber.  GALLBLADDER: Within normal limits.  SPLEEN: Within normal limits.  PANCREAS: Within normal limits.  ADRENALS: Within normal limits.  KIDNEYS/URETERS: A small right cortical cyst.    BLADDER: Within normal limits.  REPRODUCTIVE ORGANS: Uterus and adnexa within normal limits.    BOWEL: No bowel obstruction. Terminal ileum again demonstrates mild   thickening and mucosal enhancement. No adjacent fatty stranding or free   fluid. Remainder of the small bowel is normal in appearance. Mild mural   thickening of the rectosigmoid colon with engorgement of the vasa recta   with similar appearance of descending colon. These findings are overall   improved in appearance since prior exam. Appendix is normal.  PERITONEUM/RETROPERITONEUM: Trace free fluid in the pelvis.  VESSELS: Circumaortic left renal vein.  LYMPH NODES: No lymphadenopathy.  ABDOMINAL WALL: Within normal limits.  BONES: Within normal limits.    IMPRESSION:  Mild terminal ileitis. Findings in the rectosigmoid and descending colon   can be related to mild colitis or underdistention.  prednisone prescribed  recommended pt f/u w/ derm/rheumatology, gi, obgyn, and urology   ED return precautions discussed w/ pt= prn of if sx worsen  apap recommended  she was able to ambulate on her own out of the ED w/o assistance.

## 2024-09-13 NOTE — ED PROVIDER NOTE - OBJECTIVE STATEMENT
24 y/o female w/ a pmh of eczema and crohns reports today c/o intermittent eczema flare up  since 11/23 and bodyaches X 3 weeks. she reports her bodyaches first started off in one area of her body and has progressed to different spots . she states she has not her crohns treatment in 3 months- was on remeron. In addition, she states that she has seen a dermatoogist in the past but they gave her topical creams which did not help. 22 y/o female w/ a pmh of eczema and crohns reports today c/o intermittent eczema flare up  since 11/23 and bodyaches X 3 weeks. she reports her bodyaches first started off in one area of her body and has progressed to different spots .Furthermore, she states she has noticed blood/mucus in her stool for >1 week.  She states she has not her crohns treatment in 3 months- was on remicaid. In addition, she states that she has seen a dermatologist in the past but they gave her topical creams which did not help.Denies; abdominal pain, cp, sob, dizziness, pre-syncopal episode, recent injury, rash, fever, chills, nausea or vomiting. 24 y/o female w/ a pmh of eczema and crohns reports today c/o intermittent eczema flare up  since 11/23 and bodyaches X 3 weeks. she reports her bodyaches first started off in one area of her body and has progressed to different spots .Furthermore, she states she has noticed blood/mucus in her stool for >1 week.  She states she has not her crohns treatment in 3 months- was on remicaid. In addition, she states that she has seen a dermatologist in the past but they gave her topical creams which did not help. Denies; abdominal pain, cp, sob, dizziness, pre-syncopal episode, recent injury, rash, fever, chills, nausea or vomiting. 22 y/o female w/ a pmh of eczema and crohns reports today c/o intermittent eczema flare up on b/l ue/le that is a itchy rash  since 11/23 and body aches X 3 weeks. she reports her body aches first started off in one area of her body and has progressed to different spots .Furthermore, she states she has noticed blood/mucus in her stool for >1 week.  She states she has not her crohns treatment in 3 months- was on remicaid. In addition, she states that she has seen a dermatologist in the past but they gave her topical creams which did not help. Denies; abdominal pain, cp, sob, dizziness, pre-syncopal episode, recent injury, rash, fever, chills, nausea or vomiting.

## 2024-09-13 NOTE — ED ADULT TRIAGE NOTE - CHIEF COMPLAINT QUOTE
S :  Please note, pt has 2 MRN's.  [id number] and [id number].  They are marked for merge, but probably wont happen until pt discharge's from [facility] per [facility] with [facility].  We are to use [phone] at this time.    R:  Admit to 6AE    accepts    Mom to sign in.    1256 -  6A informed  1258 -  ED informed    Pending adequate staffing   pt ambulatory, c/o muscle aches and joint pain, pt with hx of Chron' s disease. pt also endorses eczema flare up, chest pian, nausea. appears uncomfortable.

## 2024-09-13 NOTE — ED ADULT NURSE NOTE - CHIEF COMPLAINT QUOTE
pt ambulatory, c/o muscle aches and joint pain, pt with hx of Chron' s disease. pt also endorses eczema flare up, chest pian, nausea. appears uncomfortable.

## 2024-09-13 NOTE — ED PROVIDER NOTE - PATIENT PORTAL LINK FT
You can access the FollowMyHealth Patient Portal offered by [facility] by registering at the following website: [email or web address]. By joining [employer]’s FollowMyHealth portal, you will also be able to view your health information using other applications (apps) compatible with our system.

## 2024-09-13 NOTE — ED PROVIDER NOTE - CLINICAL SUMMARY MEDICAL DECISION MAKING FREE TEXT BOX
24 y/o female w/ a pmh of eczema and crohns reports today c/o intermittent eczema flare up  since 11/23 and bodyaches X 3 weeks. she reports her bodyaches first started off in one area of her body and has progressed to different spots .Furthermore, she states she has noticed blood/mucus in her stool for >1 week.  She states she has not her crohns treatment in 3 months- was on remicaid. In addition, she states that she has seen a dermatologist in the past but they gave her topical creams which did not help    pt stable, in and non-toxic appearing  plan for pt- pain meds, labs, ct a/p   likely dc w/ outpatient referral for derm/rheumtology/gi   she is resting comfortably reassess.

## 2024-09-13 NOTE — ED ADULT NURSE NOTE - CAS EDP DISCH TYPE
Alert and oriented to person, place and time, memory intact, behavior appropriate to situation. Home

## 2024-09-13 NOTE — ED ADULT NURSE NOTE - OBJECTIVE STATEMENT
Pt received to fast track. pt is AxO 3 and ambulatory. Pt c/o body aches with increased joint pain with first time eczema flair up. pt endorses n/v, and diarrhea earlier this morning. pt endorses hx of Chrons disease. pt respirations even and unlabored. pt denies headaches, dizziness, chest pain, abdominal pain, or fevers. Pt medicated as prescribed. Awaiting further orders. Plan of care ongoing.

## 2024-11-15 NOTE — PATIENT PROFILE ADULT - FUNCTIONAL ASSESSMENT - DAILY ACTIVITY 3.
----- Message from Anjelica sent at 11/15/2024  1:18 PM CST -----  Regarding: DME knee brace order  Contact: Patient can be contacted @# [phone]  PT is at DME trying to get her brace. DME says they have no order. Please reach out at earliest opportunity. - PT is still at facility      Patient can be contacted @# [phone]   4 = No assist / stand by assistance

## 2025-07-30 NOTE — ED PROVIDER NOTE - CROS ED ENMT ALL NEG
Pt called in, she has not been seen by dr rolon for 5 yrs, she had a question about uterine prolapsed, no other symptoms were mentioned on call,    negative...

## (undated) DEVICE — ARTHREX KIT FOR DX SWIVEL LOCK 3.5X8.5MM

## (undated) DEVICE — BASIN SET DOUBLE

## (undated) DEVICE — POSITIONER FOAM EGG CRATE ULNAR 2PCS (PINK)

## (undated) DEVICE — DRSG BANDAID 0.75X3"

## (undated) DEVICE — CONTAINER FORMALIN 10% 20ML

## (undated) DEVICE — DRSG 2X2

## (undated) DEVICE — ELCTR ECG CONDUCTIVE ADHESIVE

## (undated) DEVICE — TUBING IV SET GRAVITY 3Y 100" MACRO

## (undated) DEVICE — CONTAINER FORMALIN 80ML YELLOW

## (undated) DEVICE — ELCTR GROUNDING PAD ADULT COVIDIEN

## (undated) DEVICE — TUBING SUCTION NONCONDUCTIVE 6MM X 12FT

## (undated) DEVICE — LUBRICATING JELLY HR ONE SHOT 3G

## (undated) DEVICE — GOWN LG

## (undated) DEVICE — LABELS BLANK W PEN

## (undated) DEVICE — PACK IV START WITH CHG

## (undated) DEVICE — BIOPSY FORCEP COLD DISP

## (undated) DEVICE — CATH IV SAFE BC 22G X 1" (BLUE)

## (undated) DEVICE — DRSG CURITY GAUZE SPONGE 4 X 4" 12-PLY NON-STERILE

## (undated) DEVICE — BIOPSY FORCEP RADIAL JAW 4 STANDARD WITH NEEDLE

## (undated) DEVICE — VENODYNE/SCD SLEEVE CALF MEDIUM

## (undated) DEVICE — WARMING BLANKET FULL ADULT

## (undated) DEVICE — LINE BREATHE SAMPLNG

## (undated) DEVICE — BASIN EMESIS 10IN GRADUATED MAUVE

## (undated) DEVICE — TUBING MEDI-VAC W MAXIGRIP CONNECTORS 1/4"X6'

## (undated) DEVICE — SALIVA EJECTOR (BLUE)

## (undated) DEVICE — CANISTER DISPOSABLE THIN WALL 3000CC